# Patient Record
Sex: MALE | NOT HISPANIC OR LATINO | Employment: FULL TIME | URBAN - METROPOLITAN AREA
[De-identification: names, ages, dates, MRNs, and addresses within clinical notes are randomized per-mention and may not be internally consistent; named-entity substitution may affect disease eponyms.]

---

## 2019-11-11 ENCOUNTER — OFFICE VISIT (OUTPATIENT)
Dept: FAMILY MEDICINE CLINIC | Facility: CLINIC | Age: 44
End: 2019-11-11

## 2019-11-11 VITALS
HEART RATE: 102 BPM | SYSTOLIC BLOOD PRESSURE: 120 MMHG | OXYGEN SATURATION: 99 % | BODY MASS INDEX: 27.32 KG/M2 | TEMPERATURE: 97.8 F | HEIGHT: 65 IN | DIASTOLIC BLOOD PRESSURE: 72 MMHG | WEIGHT: 164 LBS

## 2019-11-11 DIAGNOSIS — G43.009 MIGRAINE WITHOUT AURA AND WITHOUT STATUS MIGRAINOSUS, NOT INTRACTABLE: ICD-10-CM

## 2019-11-11 DIAGNOSIS — J02.9 SORE THROAT: Primary | ICD-10-CM

## 2019-11-11 DIAGNOSIS — J30.1 ACUTE SEASONAL ALLERGIC RHINITIS DUE TO POLLEN: ICD-10-CM

## 2019-11-11 DIAGNOSIS — H65.23 BILATERAL CHRONIC SEROUS OTITIS MEDIA: ICD-10-CM

## 2019-11-11 PROCEDURE — 99214 OFFICE O/P EST MOD 30 MIN: CPT | Performed by: FAMILY MEDICINE

## 2019-11-11 RX ORDER — CYCLOBENZAPRINE HCL 10 MG
10 TABLET ORAL DAILY
COMMUNITY
End: 2020-06-22

## 2019-11-11 RX ORDER — FLUTICASONE PROPIONATE 50 MCG
2 SPRAY, SUSPENSION (ML) NASAL DAILY
Qty: 1 BOTTLE | Refills: 3 | Status: SHIPPED | OUTPATIENT
Start: 2019-11-11 | End: 2019-11-12

## 2019-11-11 RX ORDER — FENOFIBRATE 160 MG/1
160 TABLET ORAL DAILY
COMMUNITY
End: 2020-06-22

## 2019-11-11 RX ORDER — ACETAMINOPHEN, ASPIRIN AND CAFFEINE 250; 250; 65 MG/1; MG/1; MG/1
1 TABLET, FILM COATED ORAL DAILY
Qty: 60 TABLET | Refills: 3 | Status: SHIPPED | OUTPATIENT
Start: 2019-11-11 | End: 2021-03-12

## 2019-11-11 RX ORDER — CETIRIZINE HYDROCHLORIDE 10 MG/1
10 TABLET ORAL DAILY
COMMUNITY
End: 2020-04-29 | Stop reason: SDUPTHER

## 2019-11-11 NOTE — PROGRESS NOTES
No chief complaint on file.      Dony Chisholm is a 44 y.o. male.     History of Present Illness   Pt is here to get reestablished and   ST for about 5 days. And he feels a pinching pain in his left ear.  No cough, congestion, or sinus pain.  No fever or chills.    He has been having headache frontal for some time and comes and goes.  No blurred vision or SOB.  He has had migraine in the past as well.    The following portions of the patient's history were reviewed and updated as appropriate: allergies, current medications, past family history, past medical history, past social history, past surgical history and problem list.    Review of Systems   Constitutional: Negative for fatigue.   Eyes: Negative for blurred vision.   Respiratory: Negative for cough, chest tightness and shortness of breath.    Cardiovascular: Negative for chest pain, palpitations and leg swelling.   Neurological: Negative for dizziness, light-headedness and headache.       Patient Active Problem List   Diagnosis   • Nephrolithiasis   • Migraine   • Hypertriglyceridemia   • Eczema   • Acute seasonal allergic rhinitis due to pollen   • Sore throat   • Bilateral chronic serous otitis media       No Known Allergies      Current Outpatient Medications:   •  cetirizine (zyrTEC) 10 MG tablet, Take 10 mg by mouth Daily., Disp: , Rfl:   •  cyclobenzaprine (FLEXERIL) 10 MG tablet, Take 10 mg by mouth Daily., Disp: , Rfl:   •  fenofibrate 160 MG tablet, Take 160 mg by mouth Daily., Disp: , Rfl:     Past Medical History:   Diagnosis Date   • Abdominal pain    • Acute seasonal allergic rhinitis due to pollen    • Chest pain    • Eczema    • Headache    • Hypertriglyceridemia    • Lumbar strain    • 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 No pertinent surgical history.    Family History   Problem Relation Age of Onset   • No Known Problems  "Mother        Social History     Tobacco Use   • Smoking status: Never Smoker   • Smokeless tobacco: Never Used   Substance Use Topics   • Alcohol use: No     Frequency: Never            Objective     Visit Vitals  /72   Pulse 102   Temp 97.8 °F (36.6 °C)   Ht 165.1 cm (65\")   Wt 74.4 kg (164 lb)   SpO2 99%   BMI 27.29 kg/m²       Physical Exam   Constitutional: He appears well-developed. No distress.   HENT:   Bilateral effusion   Eyes: Conjunctivae and lids are normal.   Neck: Trachea normal. Carotid bruit is not present. No thyroid mass and no thyromegaly present.   Cardiovascular: Normal rate, regular rhythm and normal heart sounds.   Pulmonary/Chest: Effort normal and breath sounds normal.   Lymphadenopathy:     He has no cervical adenopathy.   Neurological: He is alert. He is not disoriented.   Skin: Skin is warm and dry.   Psychiatric: He has a normal mood and affect. His speech is normal and behavior is normal. He is attentive.   Nursing note and vitals reviewed.          Procedures    Assessment/Plan   Problems Addressed this Visit        Cardiovascular and Mediastinum    Migraine    Relevant Medications    cyclobenzaprine (FLEXERIL) 10 MG tablet       Respiratory    Acute seasonal allergic rhinitis due to pollen    Sore throat - Primary       Nervous and Auditory    Bilateral chronic serous otitis media          No orders of the defined types were placed in this encounter.      Current Outpatient Medications   Medication Sig Dispense Refill   • cetirizine (zyrTEC) 10 MG tablet Take 10 mg by mouth Daily.     • cyclobenzaprine (FLEXERIL) 10 MG tablet Take 10 mg by mouth Daily.     • fenofibrate 160 MG tablet Take 160 mg by mouth Daily.       No current facility-administered medications for this visit.      Sx tx and otc meds and fluids and rest  SE discussed.    No Follow-up on file.    There are no Patient Instructions on file for this visit.         "

## 2019-11-12 RX ORDER — MOMETASONE FUROATE 50 UG/1
2 SPRAY, METERED NASAL DAILY
Qty: 1 EACH | Refills: 12 | Status: SHIPPED | OUTPATIENT
Start: 2019-11-12 | End: 2020-06-22

## 2019-12-06 ENCOUNTER — OFFICE VISIT (OUTPATIENT)
Dept: FAMILY MEDICINE CLINIC | Facility: CLINIC | Age: 44
End: 2019-12-06

## 2019-12-06 VITALS
OXYGEN SATURATION: 98 % | BODY MASS INDEX: 26.96 KG/M2 | DIASTOLIC BLOOD PRESSURE: 70 MMHG | WEIGHT: 162 LBS | TEMPERATURE: 97.6 F | SYSTOLIC BLOOD PRESSURE: 110 MMHG | HEART RATE: 101 BPM

## 2019-12-06 DIAGNOSIS — R10.84 GENERALIZED ABDOMINAL PAIN: Primary | ICD-10-CM

## 2019-12-06 LAB
BASOPHILS # BLD AUTO: 0 X10E3/UL (ref 0–0.2)
BASOPHILS NFR BLD AUTO: 0 %
EOSINOPHIL # BLD AUTO: 0.3 X10E3/UL (ref 0–0.4)
EOSINOPHIL NFR BLD AUTO: 3 %
ERYTHROCYTE [DISTWIDTH] IN BLOOD BY AUTOMATED COUNT: 13.4 % (ref 12.3–15.4)
HCT VFR BLD AUTO: 42.2 % (ref 37.5–51)
HGB BLD-MCNC: 13.9 G/DL (ref 13–17.7)
LYMPHOCYTES # BLD AUTO: 2.5 X10E3/UL (ref 0.7–3.1)
LYMPHOCYTES NFR BLD AUTO: 30 %
MCH RBC QN AUTO: 27.9 PG (ref 26.6–33)
MCHC RBC AUTO-ENTMCNC: 32.9 G/DL (ref 31.5–35.7)
MCV RBC AUTO: 85 FL (ref 79–97)
MONOCYTES # BLD AUTO: 0.7 X10E3/UL (ref 0.1–0.9)
MONOCYTES NFR BLD AUTO: 8 %
NEUTROPHILS # BLD AUTO: 4.7 X10E3/UL (ref 1.4–7)
NEUTROPHILS NFR BLD AUTO: 59 %
PLATELET # BLD AUTO: 261 X10E3/UL (ref 150–450)
RBC # BLD AUTO: 4.99 X10E6/UL (ref 4.14–5.8)
WBC # BLD AUTO: 8.2 X10E3/UL (ref 3.4–10.8)

## 2019-12-06 PROCEDURE — 99213 OFFICE O/P EST LOW 20 MIN: CPT | Performed by: FAMILY MEDICINE

## 2019-12-06 NOTE — PROGRESS NOTES
Chief Complaint   Patient presents with   • Follow-up     Pt had surgery on Monday. Having pain at the incision site.        Dony Chisholm is a 44 y.o. male.     History of Present Illness   PT is here for follow up for appendicitis surgery this past Monday and he states he is having pains on site of incision.  He called surgery office and was told to follow up with us.  He has no fever or chills.  Pain is intermittent and no discharge from the site.  Dr Arthur is surgeon.  The following portions of the patient's history were reviewed and updated as appropriate: allergies, current medications, past family history, past medical history, past social history, past surgical history and problem list.    Review of Systems   Constitutional: Negative for fatigue.   Eyes: Negative for blurred vision.   Respiratory: Negative for cough, chest tightness and shortness of breath.    Cardiovascular: Negative for chest pain, palpitations and leg swelling.   Gastrointestinal: Positive for abdominal pain.   Neurological: Negative for dizziness, light-headedness and headache.       Patient Active Problem List   Diagnosis   • Nephrolithiasis   • Migraine   • Hypertriglyceridemia   • Eczema   • Acute seasonal allergic rhinitis due to pollen   • Sore throat   • Bilateral chronic serous otitis media   • Generalized abdominal pain       No Known Allergies      Current Outpatient Medications:   •  aspirin-acetaminophen-caffeine (EXCEDRIN MIGRAINE) 250-250-65 MG per tablet, Take 1 tablet by mouth Daily. Take one tablet daily as needed., Disp: 60 tablet, Rfl: 3  •  cetirizine (zyrTEC) 10 MG tablet, Take 10 mg by mouth Daily., Disp: , Rfl:   •  cyclobenzaprine (FLEXERIL) 10 MG tablet, Take 10 mg by mouth Daily., Disp: , Rfl:   •  fenofibrate 160 MG tablet, Take 160 mg by mouth Daily., Disp: , Rfl:   •  mometasone (NASONEX) 50 MCG/ACT nasal spray, 2 sprays into the nostril(s) as directed by provider Daily., Disp: 1 each, Rfl:  12    Past Medical History:   Diagnosis Date   • Abdominal pain    • Acute seasonal allergic rhinitis due to pollen    • Chest pain    • Eczema    • Headache    • Hypertriglyceridemia    • Lumbar strain    • 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    Family History   Problem Relation Age of Onset   • No Known Problems Mother        Social History     Tobacco Use   • Smoking status: Never Smoker   • Smokeless tobacco: Never Used   Substance Use Topics   • Alcohol use: No     Frequency: Never            Objective     Visit Vitals  /70   Pulse 101   Temp 97.6 °F (36.4 °C)   Wt 73.5 kg (162 lb)   SpO2 98%   BMI 26.96 kg/m²       Physical Exam   Constitutional: He appears well-developed and well-nourished.   Abdominal: Soft. Bowel sounds are normal.   Some ttp ruq pain at site of surgery.  No discharge  And erythema present     Nursing note and vitals reviewed.      Lab Results   Component Value Date    BUN 9 12/02/2019    CREATININE 0.8 12/02/2019    BCR 11.3 12/02/2019    K 4.0 12/02/2019    CO2 25 12/02/2019    CALCIUM 8.8 12/02/2019    ALBUMIN 3.7 12/02/2019    LABIL2 1.0 (L) 12/02/2019    AST 28 12/02/2019    ALT 40 12/02/2019       WBC   Date Value Ref Range Status   12/02/2019 8.14 4.5 - 11.0 10*3/uL Final     RBC   Date Value Ref Range Status   12/02/2019 4.98 4.5 - 5.9 10*6/uL Final     Hemoglobin   Date Value Ref Range Status   12/02/2019 14.0 13.5 - 17.5 g/dL Final     Hematocrit   Date Value Ref Range Status   12/02/2019 43.4 41.0 - 53.0 % Final     MCV   Date Value Ref Range Status   12/02/2019 87.1 80.0 - 100.0 fL Final     MCH   Date Value Ref Range Status   12/02/2019 28.1 26.0 - 34.0 pg Final     MCHC   Date Value Ref Range Status   12/02/2019 32.3 31.0 - 37.0 g/dL Final     RDW   Date Value Ref Range Status   12/02/2019 13.9 12.0 - 16.8 % Final     MPV   Date Value Ref Range Status    12/02/2019 11.7 (H) 6.7 - 10.8 fL Final     Platelets   Date Value Ref Range Status   12/02/2019 216 140 - 440 10*3/uL Final     Neutrophil Rel %   Date Value Ref Range Status   12/02/2019 55.8 45 - 80 % Final     Lymphocyte Rel %   Date Value Ref Range Status   12/02/2019 34.6 15 - 50 % Final     Monocyte Rel %   Date Value Ref Range Status   12/02/2019 6.1 0 - 15 % Final     Eosinophil %   Date Value Ref Range Status   12/02/2019 3.2 0 - 7 % Final     Basophil Rel %   Date Value Ref Range Status   12/02/2019 0.2 0 - 2 % Final     Immature Grans %   Date Value Ref Range Status   12/02/2019 0.1 (H) 0 % Final     Neutrophils Absolute   Date Value Ref Range Status   12/02/2019 4.53 2.0 - 8.8 10*3/uL Final     Lymphocytes Absolute   Date Value Ref Range Status   12/02/2019 2.82 0.7 - 5.5 10*3/uL Final     Monocytes Absolute   Date Value Ref Range Status   12/02/2019 0.50 0.0 - 1.7 10*3/uL Final     Eosinophils Absolute   Date Value Ref Range Status   12/02/2019 0.26 0.0 - 0.8 10*3/uL Final     Basophils Absolute   Date Value Ref Range Status   12/02/2019 0.02 0.0 - 0.2 10*3/uL Final     Immature Grans, Absolute   Date Value Ref Range Status   12/02/2019 0.01 <1 10*3/uL Final     nRBC   Date Value Ref Range Status   12/02/2019 0 0 /100(WBC) Final       No results found for: HGBA1C    No results found for: [id number]    No results found for: TSH    No results found for: CHOL  No results found for: TRIG  No results found for: HDL  No results found for: LDL  No results found for: VLDL  No results found for: LDLHDL      Procedures    Assessment/Plan   Problems Addressed this Visit        Nervous and Auditory    Generalized abdominal pain - Primary    Relevant Orders    CBC & Differential          Orders Placed This Encounter   Procedures   • CBC & Differential     Order Specific Question:   Manual Differential     Answer:   No       Current Outpatient Medications   Medication Sig Dispense Refill   •  aspirin-acetaminophen-caffeine (EXCEDRIN MIGRAINE) 250-250-65 MG per tablet Take 1 tablet by mouth Daily. Take one tablet daily as needed. 60 tablet 3   • cetirizine (zyrTEC) 10 MG tablet Take 10 mg by mouth Daily.     • cyclobenzaprine (FLEXERIL) 10 MG tablet Take 10 mg by mouth Daily.     • fenofibrate 160 MG tablet Take 160 mg by mouth Daily.     • mometasone (NASONEX) 50 MCG/ACT nasal spray 2 sprays into the nostril(s) as directed by provider Daily. 1 each 12     No current facility-administered medications for this visit.      Will get cbc and warning signs discussed.  No Follow-up on file.    There are no Patient Instructions on file for this visit.

## 2020-01-28 ENCOUNTER — OFFICE VISIT (OUTPATIENT)
Dept: FAMILY MEDICINE CLINIC | Facility: CLINIC | Age: 45
End: 2020-01-28

## 2020-01-28 VITALS
DIASTOLIC BLOOD PRESSURE: 78 MMHG | SYSTOLIC BLOOD PRESSURE: 112 MMHG | TEMPERATURE: 97.5 F | WEIGHT: 164 LBS | HEIGHT: 65 IN | OXYGEN SATURATION: 98 % | BODY MASS INDEX: 27.32 KG/M2 | HEART RATE: 75 BPM

## 2020-01-28 DIAGNOSIS — F51.01 PRIMARY INSOMNIA: ICD-10-CM

## 2020-01-28 DIAGNOSIS — R10.84 GENERALIZED ABDOMINAL PAIN: Primary | ICD-10-CM

## 2020-01-28 PROCEDURE — 99214 OFFICE O/P EST MOD 30 MIN: CPT | Performed by: FAMILY MEDICINE

## 2020-01-28 RX ORDER — OMEPRAZOLE 20 MG/1
20 CAPSULE, DELAYED RELEASE ORAL DAILY
Qty: 30 CAPSULE | Refills: 3 | Status: SHIPPED | OUTPATIENT
Start: 2020-01-28 | End: 2020-02-03 | Stop reason: SDUPTHER

## 2020-01-28 NOTE — PROGRESS NOTES
Chief Complaint   Patient presents with   • Abdominal Pain     Pt is her c/o stomach pain. Ongoing for 15 days. Pain is burning and pinching. Pt had an appendectomy on Dec 2.    • Insomnia     Pt works 3rd shift and has stomach falling asleep        Doyn Chisholm is a 45 y.o. male.   Pt is here for 2 week hx of some burning on rt side of abd and chest and this comes and goes.  Feels like a pinching feeling.  No NVCD.  No fever chills.  Normal appetite.  No rash.   Insomnia- he works night shift and has trouble falling asleep when he gets home.  And this causes some fatigue.  Occasional headaches as well.    History of Present Illness    The following portions of the patient's history were reviewed and updated as appropriate: allergies, current medications, past family history, past medical history, past social history, past surgical history and problem list.    Review of Systems   Constitutional: Negative for fatigue.   Eyes: Negative for blurred vision.   Respiratory: Negative for cough, chest tightness and shortness of breath.    Cardiovascular: Negative for chest pain, palpitations and leg swelling.   Neurological: Negative for dizziness, light-headedness and headache.       Patient Active Problem List   Diagnosis   • Nephrolithiasis   • Migraine   • Hypertriglyceridemia   • Eczema   • Acute seasonal allergic rhinitis due to pollen   • Sore throat   • Bilateral chronic serous otitis media   • Generalized abdominal pain   • Primary insomnia       No Known Allergies      Current Outpatient Medications:   •  aspirin-acetaminophen-caffeine (EXCEDRIN MIGRAINE) 250-250-65 MG per tablet, Take 1 tablet by mouth Daily. Take one tablet daily as needed., Disp: 60 tablet, Rfl: 3  •  cetirizine (zyrTEC) 10 MG tablet, Take 10 mg by mouth Daily., Disp: , Rfl:   •  cyclobenzaprine (FLEXERIL) 10 MG tablet, Take 10 mg by mouth Daily., Disp: , Rfl:   •  fenofibrate 160 MG tablet, Take 160 mg by mouth Daily., Disp: ,  "Rfl:   •  mometasone (NASONEX) 50 MCG/ACT nasal spray, 2 sprays into the nostril(s) as directed by provider Daily., Disp: 1 each, Rfl: 12  •  omeprazole (PrilOSEC) 20 MG capsule, Take 1 capsule by mouth Daily., Disp: 30 capsule, Rfl: 3    Past Medical History:   Diagnosis Date   • Abdominal pain    • Acute seasonal allergic rhinitis due to pollen    • Chest pain    • Eczema    • Headache    • Hypertriglyceridemia    • Lumbar strain    • 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 No pertinent surgical history.    Family History   Problem Relation Age of Onset   • No Known Problems Mother        Social History     Tobacco Use   • Smoking status: Never Smoker   • Smokeless tobacco: Never Used   Substance Use Topics   • Alcohol use: No     Frequency: Never            Objective     Visit Vitals  /78   Pulse 75   Temp 97.5 °F (36.4 °C)   Ht 165.1 cm (65\")   Wt 74.4 kg (164 lb)   SpO2 98%   BMI 27.29 kg/m²       Physical Exam   Constitutional: He appears well-developed. No distress.   Eyes: Conjunctivae and lids are normal.   Neck: Trachea normal. Carotid bruit is not present. No thyroid mass and no thyromegaly present.   Cardiovascular: Normal rate, regular rhythm and normal heart sounds.   Pulmonary/Chest: Effort normal and breath sounds normal.   Abdominal: Soft. Bowel sounds are normal. He exhibits no distension and no mass. There is no tenderness. There is no rebound and no guarding.   Lymphadenopathy:     He has no cervical adenopathy.   Neurological: He is alert. He is not disoriented.   Skin: Skin is warm and dry. No rash noted.   Psychiatric: He has a normal mood and affect. His speech is normal and behavior is normal. He is attentive.   Nursing note and vitals reviewed.      Lab Results   Component Value Date    BUN 13 12/07/2019    CREATININE 0.9 12/07/2019    BCR 14.4 12/07/2019    K 4.2 12/07/2019    CO2 29 " 12/07/2019    CALCIUM 9.0 12/07/2019    ALBUMIN 4.0 12/07/2019    LABIL2 1.1 12/07/2019    AST 34 12/07/2019    ALT 55 (H) 12/07/2019       WBC   Date Value Ref Range Status   12/07/2019 6.07 4.5 - 11.0 10*3/uL Final     RBC   Date Value Ref Range Status   12/07/2019 4.89 4.5 - 5.9 10*6/uL Final     Hemoglobin   Date Value Ref Range Status   12/07/2019 13.7 13.5 - 17.5 g/dL Final     Hematocrit   Date Value Ref Range Status   12/07/2019 43.4 41.0 - 53.0 % Final     MCV   Date Value Ref Range Status   12/07/2019 88.8 80.0 - 100.0 fL Final     MCH   Date Value Ref Range Status   12/07/2019 28.0 26.0 - 34.0 pg Final     MCHC   Date Value Ref Range Status   12/07/2019 31.6 31.0 - 37.0 g/dL Final     RDW   Date Value Ref Range Status   12/07/2019 13.6 12.0 - 16.8 % Final     MPV   Date Value Ref Range Status   12/07/2019 10.0 6.7 - 10.8 fL Final     Platelets   Date Value Ref Range Status   12/07/2019 233 140 - 440 10*3/uL Final     Neutrophil Rel %   Date Value Ref Range Status   12/07/2019 53.8 45 - 80 % Final     Lymphocyte Rel %   Date Value Ref Range Status   12/07/2019 34.9 15 - 50 % Final     Monocyte Rel %   Date Value Ref Range Status   12/07/2019 7.7 0 - 15 % Final     Eosinophil %   Date Value Ref Range Status   12/07/2019 3.1 0 - 7 % Final     Basophil Rel %   Date Value Ref Range Status   12/07/2019 0.2 0 - 2 % Final     Immature Grans %   Date Value Ref Range Status   12/07/2019 0.3 (H) 0 % Final     Neutrophils Absolute   Date Value Ref Range Status   12/07/2019 3.26 2.0 - 8.8 10*3/uL Final     Lymphocytes Absolute   Date Value Ref Range Status   12/07/2019 2.12 0.7 - 5.5 10*3/uL Final     Monocytes Absolute   Date Value Ref Range Status   12/07/2019 0.47 0.0 - 1.7 10*3/uL Final     Eosinophils Absolute   Date Value Ref Range Status   12/07/2019 0.19 0.0 - 0.8 10*3/uL Final     Basophils Absolute   Date Value Ref Range Status   12/07/2019 0.01 0.0 - 0.2 10*3/uL Final     Immature Grans, Absolute   Date  Value Ref Range Status   12/07/2019 0.02 <1 10*3/uL Final     BC   Date Value Ref Range Status   12/07/2019 0 0 /100(WBC) Final       No results found for: HGBA1C    No results found for: [id number]    No results found for: TSH    No results found for: CHOL  No results found for: TRIG  No results found for: HDL  No results found for: LDL  No results found for: VLDL  No results found for: LDLHDL      Procedures    Assessment/Plan   Problems Addressed this Visit        Nervous and Auditory    Generalized abdominal pain - Primary       Other    Primary insomnia          No orders of the defined types were placed in this encounter.      Current Outpatient Medications   Medication Sig Dispense Refill   • aspirin-acetaminophen-caffeine (EXCEDRIN MIGRAINE) 250-250-65 MG per tablet Take 1 tablet by mouth Daily. Take one tablet daily as needed. 60 tablet 3   • cetirizine (zyrTEC) 10 MG tablet Take 10 mg by mouth Daily.     • cyclobenzaprine (FLEXERIL) 10 MG tablet Take 10 mg by mouth Daily.     • fenofibrate 160 MG tablet Take 160 mg by mouth Daily.     • mometasone (NASONEX) 50 MCG/ACT nasal spray 2 sprays into the nostril(s) as directed by provider Daily. 1 each 12   • omeprazole (PrilOSEC) 20 MG capsule Take 1 capsule by mouth Daily. 30 capsule 3     No current facility-administered medications for this visit.      Start PPI and will try melatonin for   No follow-ups on file.    There are no Patient Instructions on file for this visit.

## 2020-02-03 ENCOUNTER — TELEPHONE (OUTPATIENT)
Dept: FAMILY MEDICINE CLINIC | Facility: CLINIC | Age: 45
End: 2020-02-03

## 2020-02-03 RX ORDER — OMEPRAZOLE 20 MG/1
20 CAPSULE, DELAYED RELEASE ORAL DAILY
Qty: 30 CAPSULE | Refills: 3 | Status: SHIPPED | OUTPATIENT
Start: 2020-02-03 | End: 2020-06-22

## 2020-02-03 NOTE — TELEPHONE ENCOUNTER
PT IS SAYING PHARMACY DIDN'T RECEIVE HIS MEDICATION PLEASE RESEND omeprazole (PrilOSEC) 20 MG capsule

## 2020-04-29 ENCOUNTER — OFFICE VISIT (OUTPATIENT)
Dept: FAMILY MEDICINE CLINIC | Facility: CLINIC | Age: 45
End: 2020-04-29

## 2020-04-29 DIAGNOSIS — J02.9 SORE THROAT: Primary | ICD-10-CM

## 2020-04-29 DIAGNOSIS — J30.1 ACUTE SEASONAL ALLERGIC RHINITIS DUE TO POLLEN: ICD-10-CM

## 2020-04-29 PROCEDURE — 99213 OFFICE O/P EST LOW 20 MIN: CPT | Performed by: FAMILY MEDICINE

## 2020-04-29 RX ORDER — CETIRIZINE HYDROCHLORIDE 10 MG/1
10 TABLET ORAL DAILY
Qty: 30 TABLET | Refills: 3 | Status: SHIPPED | OUTPATIENT
Start: 2020-04-29 | End: 2020-06-22

## 2020-04-29 NOTE — PROGRESS NOTES
CC: Cough    Subjective   Smith Chisholm is a 45 y.o. male.     History of Present Illness   The patient presents today for follow-up via a video visit thru [facility] due to the COVID-19 pandemic for  3 day hx of sore throat.  He has no fever or chills or body aches.  He has been using ibuprofen for this but not helping much.  No cough. He has some redness in the throat.  He is currently not on allergy meds but does have seasonal allergies        The following portions of the patient's history were reviewed and updated as appropriate: allergies, current medications, past family history, past medical history, past social history, past surgical history and problem list.    Review of Systems  See above    Patient Active Problem List   Diagnosis   • Nephrolithiasis   • Migraine   • Hypertriglyceridemia   • Eczema   • Acute seasonal allergic rhinitis due to pollen   • Sore throat   • Bilateral chronic serous otitis media   • Generalized abdominal pain   • Primary insomnia       No Known Allergies      Current Outpatient Medications:   •  aspirin-acetaminophen-caffeine (EXCEDRIN MIGRAINE) 250-250-65 MG per tablet, Take 1 tablet by mouth Daily. Take one tablet daily as needed., Disp: 60 tablet, Rfl: 3  •  cetirizine (zyrTEC) 10 MG tablet, Take 1 tablet by mouth Daily., Disp: 30 tablet, Rfl: 3  •  cyclobenzaprine (FLEXERIL) 10 MG tablet, Take 10 mg by mouth Daily., Disp: , Rfl:   •  fenofibrate 160 MG tablet, Take 160 mg by mouth Daily., Disp: , Rfl:   •  mometasone (NASONEX) 50 MCG/ACT nasal spray, 2 sprays into the nostril(s) as directed by provider Daily., Disp: 1 each, Rfl: 12  •  omeprazole (PrilOSEC) 20 MG capsule, Take 1 capsule by mouth Daily., Disp: 30 capsule, Rfl: 3    Past Medical History:   Diagnosis Date   • Abdominal pain    • Acute seasonal allergic rhinitis due to pollen    • Chest pain    • Eczema    • Headache    • Hypertriglyceridemia    • Lumbar strain    • 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    Family History   Problem Relation Age of Onset   • No Known Problems Mother        Social History     Tobacco Use   • Smoking status: Never Smoker   • Smokeless tobacco: Never Used   Substance Use Topics   • Alcohol use: No     Frequency: Never            Objective     There were no vitals taken for this visit. Video Visit    Physical Exam  NAD  AAOx3  No labored breathing.  EOMI   PERRLA  Some redness in throat    Lab Results   Component Value Date    BUN 9 02/26/2020    CREATININE 0.8 02/26/2020    BCR 11.3 02/26/2020    K 3.9 02/26/2020    CO2 22 02/26/2020    CALCIUM 9.0 02/26/2020    ALBUMIN 3.8 02/26/2020    LABIL2 1.1 02/26/2020    AST 37 02/26/2020    ALT 45 02/26/2020       WBC   Date Value Ref Range Status   02/26/2020 7.41 4.5 - 11.0 10*3/uL Final     RBC   Date Value Ref Range Status   02/26/2020 4.83 4.5 - 5.9 10*6/uL Final     Hemoglobin   Date Value Ref Range Status   02/26/2020 13.6 13.5 - 17.5 g/dL Final     Hematocrit   Date Value Ref Range Status   02/26/2020 41.3 41.0 - 53.0 % Final     MCV   Date Value Ref Range Status   02/26/2020 85.5 80.0 - 100.0 fL Final     MCH   Date Value Ref Range Status   02/26/2020 28.2 26.0 - 34.0 pg Final     MCHC   Date Value Ref Range Status   02/26/2020 32.9 31.0 - 37.0 g/dL Final     RDW   Date Value Ref Range Status   02/26/2020 13.8 12.0 - 16.8 % Final     MPV   Date Value Ref Range Status   02/26/2020 10.3 6.7 - 10.8 fL Final     Platelets   Date Value Ref Range Status   02/26/2020 197 140 - 440 10*3/uL Final     Neutrophil Rel %   Date Value Ref Range Status   02/26/2020 61.6 45 - 80 % Final     Lymphocyte Rel %   Date Value Ref Range Status   02/26/2020 28.5 15 - 50 % Final     Monocyte Rel %   Date Value Ref Range Status   02/26/2020 7.6 0 - 15 % Final     Eosinophil %   Date Value Ref Range Status   02/26/2020 1.9 0 - 7 % Final     Basophil  Rel %   Date Value Ref Range Status   02/26/2020 0.1 0 - 2 % Final     Immature Grans %   Date Value Ref Range Status   02/26/2020 0.3 (H) 0 % Final     Neutrophils Absolute   Date Value Ref Range Status   02/26/2020 4.57 2.0 - 8.8 10*3/uL Final     Lymphocytes Absolute   Date Value Ref Range Status   02/26/2020 2.11 0.7 - 5.5 10*3/uL Final     Monocytes Absolute   Date Value Ref Range Status   02/26/2020 0.56 0.0 - 1.7 10*3/uL Final     Eosinophils Absolute   Date Value Ref Range Status   02/26/2020 0.14 0.0 - 0.8 10*3/uL Final     Basophils Absolute   Date Value Ref Range Status   02/26/2020 0.01 0.0 - 0.2 10*3/uL Final     Immature Grans, Absolute   Date Value Ref Range Status   02/26/2020 0.02 <1 10*3/uL Final     nRBC   Date Value Ref Range Status   02/26/2020 0 0 /100(WBC) Final           Procedures    Assessment/Plan   Problems Addressed this Visit        Respiratory    Acute seasonal allergic rhinitis due to pollen    Relevant Medications    cetirizine (zyrTEC) 10 MG tablet    Sore throat - Primary          No orders of the defined types were placed in this encounter.      Current Outpatient Medications   Medication Sig Dispense Refill   • aspirin-acetaminophen-caffeine (EXCEDRIN MIGRAINE) 250-250-65 MG per tablet Take 1 tablet by mouth Daily. Take one tablet daily as needed. 60 tablet 3   • cetirizine (zyrTEC) 10 MG tablet Take 1 tablet by mouth Daily. 30 tablet 3   • cyclobenzaprine (FLEXERIL) 10 MG tablet Take 10 mg by mouth Daily.     • fenofibrate 160 MG tablet Take 160 mg by mouth Daily.     • mometasone (NASONEX) 50 MCG/ACT nasal spray 2 sprays into the nostril(s) as directed by provider Daily. 1 each 12   • omeprazole (PrilOSEC) 20 MG capsule Take 1 capsule by mouth Daily. 30 capsule 3     No current facility-administered medications for this visit.        No follow-ups on file.    There are no Patient Instructions on file for this visit.       Warm salt water gargles and will restart zyrtec.  Follow  up if no better.  Pt will call.    Time spent on visit:  12  minutes.  This patient has consented to a telehealth visit via video. The visit was scheduled as a video visit to comply with patient safety concerns in accordance with CDC recommendations.  All vitals recorded within this visit are reported by the patient.

## 2020-05-01 ENCOUNTER — TELEPHONE (OUTPATIENT)
Dept: FAMILY MEDICINE CLINIC | Facility: CLINIC | Age: 45
End: 2020-05-01

## 2020-05-01 RX ORDER — AMOXICILLIN 875 MG/1
875 TABLET, COATED ORAL 2 TIMES DAILY
Qty: 20 TABLET | Refills: 0 | Status: SHIPPED | OUTPATIENT
Start: 2020-05-01 | End: 2020-06-22

## 2020-06-04 ENCOUNTER — TELEMEDICINE (OUTPATIENT)
Dept: FAMILY MEDICINE CLINIC | Facility: CLINIC | Age: 45
End: 2020-06-04

## 2020-06-04 DIAGNOSIS — R10.84 GENERALIZED ABDOMINAL PAIN: Primary | ICD-10-CM

## 2020-06-04 DIAGNOSIS — T18.9XXA FOREIGN BODY IN DIGESTIVE TRACT, INITIAL ENCOUNTER: ICD-10-CM

## 2020-06-04 PROCEDURE — 99213 OFFICE O/P EST LOW 20 MIN: CPT | Performed by: FAMILY MEDICINE

## 2020-06-04 NOTE — PROGRESS NOTES
CC: abd pain    Subjective   Smith Chisholm is a 45 y.o. male.     History of Present Illness   The patient presents today for follow-up via a video visit due to the COVID-19 pandemic for   abd pain for the past several months off and on and he went to ER today for abd pains and all came back normal except for a foreign body in alementry tract and was told to follow up and get recheck with repeat chest xray in few days and possible GI consult as well.  He has no NVCD.  No SOB or cough or cold sx.  He has no dysuria or urine issues.  No fever or chills.  No wt gain or loss.              The following portions of the patient's history were reviewed and updated as appropriate: allergies, current medications, past family history, past medical history, past social history, past surgical history and problem list.    Review of Systems   Constitutional: Negative for fatigue.   Eyes: Negative for blurred vision.   Respiratory: Negative for cough, chest tightness and shortness of breath.    Cardiovascular: Negative for chest pain, palpitations and leg swelling.   Neurological: Negative for dizziness, light-headedness and headache.      See above    Patient Active Problem List   Diagnosis   • Nephrolithiasis   • Migraine   • Hypertriglyceridemia   • Eczema   • Acute seasonal allergic rhinitis due to pollen   • Sore throat   • Bilateral chronic serous otitis media   • Generalized abdominal pain   • Primary insomnia   • Foreign body alimentary tract       No Known Allergies      Current Outpatient Medications:   •  amoxicillin (AMOXIL) 875 MG tablet, Take 1 tablet by mouth 2 (Two) Times a Day., Disp: 20 tablet, Rfl: 0  •  aspirin-acetaminophen-caffeine (EXCEDRIN MIGRAINE) 250-250-65 MG per tablet, Take 1 tablet by mouth Daily. Take one tablet daily as needed., Disp: 60 tablet, Rfl: 3  •  cetirizine (zyrTEC) 10 MG tablet, Take 1 tablet by mouth Daily., Disp: 30 tablet, Rfl: 3  •  cyclobenzaprine (FLEXERIL) 10 MG tablet, Take  10 mg by mouth Daily., Disp: , Rfl:   •  fenofibrate 160 MG tablet, Take 160 mg by mouth Daily., Disp: , Rfl:   •  mometasone (NASONEX) 50 MCG/ACT nasal spray, 2 sprays into the nostril(s) as directed by provider Daily., Disp: 1 each, Rfl: 12  •  omeprazole (PrilOSEC) 20 MG capsule, Take 1 capsule by mouth Daily., Disp: 30 capsule, Rfl: 3    Past Medical History:   Diagnosis Date   • Abdominal pain    • Acute seasonal allergic rhinitis due to pollen    • Chest pain    • Eczema    • Headache    • Hypertriglyceridemia    • Lumbar strain    • 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 No pertinent surgical history.    Family History   Problem Relation Age of Onset   • No Known Problems Mother        Social History     Tobacco Use   • Smoking status: Never Smoker   • Smokeless tobacco: Never Used   Substance Use Topics   • Alcohol use: No     Frequency: Never            Objective     There were no vitals taken for this visit. Video Visit    Physical Exam  NAD  AAOx3  No labored breathing.  EOMI   PERRLA      Lab Results   Component Value Date    BUN 13 06/04/2020    CREATININE 0.8 06/04/2020    BCR 16.3 06/04/2020    K 4.1 06/04/2020    CO2 18 (L) 06/04/2020    CALCIUM 9.1 06/04/2020    ALBUMIN 4.0 06/04/2020    LABIL2 1.0 (L) 06/04/2020    AST 34 06/04/2020    ALT 39 06/04/2020       WBC   Date Value Ref Range Status   06/04/2020 8.66 4.5 - 11.0 10*3/uL Final     RBC   Date Value Ref Range Status   06/04/2020 4.93 4.5 - 5.9 10*6/uL Final     Hemoglobin   Date Value Ref Range Status   06/04/2020 14.3 13.5 - 17.5 g/dL Final     Hematocrit   Date Value Ref Range Status   06/04/2020 42.5 41.0 - 53.0 % Final     MCV   Date Value Ref Range Status   06/04/2020 86.2 80.0 - 100.0 fL Final     MCH   Date Value Ref Range Status   06/04/2020 29.0 26.0 - 34.0 pg Final     MCHC   Date Value Ref Range Status   06/04/2020 33.6 31.0 - 37.0  g/dL Final     RDW   Date Value Ref Range Status   06/04/2020 14.0 12.0 - 16.8 % Final     MPV   Date Value Ref Range Status   06/04/2020 11.3 (H) 6.7 - 10.8 fL Final     Platelets   Date Value Ref Range Status   06/04/2020 238 140 - 440 10*3/uL Final     Neutrophil Rel %   Date Value Ref Range Status   06/04/2020 46.0 45 - 80 % Final     Lymphocyte Rel %   Date Value Ref Range Status   06/04/2020 48.0 15 - 50 % Final     Monocyte Rel %   Date Value Ref Range Status   06/04/2020 5.0 0 - 15 % Final     Eosinophil %   Date Value Ref Range Status   06/04/2020 1.0 0 - 7 % Final     Basophil Rel %   Date Value Ref Range Status   02/26/2020 0.1 0 - 2 % Final     Immature Grans %   Date Value Ref Range Status   02/26/2020 0.3 (H) 0 % Final     Neutrophils Absolute   Date Value Ref Range Status   06/04/2020 3.98 1.5 - 7.5 /uL Final   06/04/2020 3.98 2.0 - 8.8 10*3/uL Final     Lymphocytes Absolute   Date Value Ref Range Status   06/04/2020 4.16 0.7 - 5.5 10*3/uL Final     Monocytes Absolute   Date Value Ref Range Status   06/04/2020 0.43 0.0 - 1.7 10*3/uL Final     Eosinophils Absolute   Date Value Ref Range Status   06/04/2020 0.09 0.0 - 0.8 10*3/uL Final     Basophils Absolute   Date Value Ref Range Status   02/26/2020 0.01 0.0 - 0.2 10*3/uL Final     Immature Grans, Absolute   Date Value Ref Range Status   02/26/2020 0.02 <1 10*3/uL Final     nRBC   Date Value Ref Range Status   06/04/2020 0 0 /100(WBC) Final       No results found for: HGBA1C    No results found for: EUPUTXUK36    No results found for: TSH    No results found for: CHOL  No results found for: TRIG  No results found for: HDL  No results found for: LDL  No results found for: VLDL  No results found for: LDLHDL      Procedures    Assessment/Plan   Problems Addressed this Visit        Digestive    Foreign body alimentary tract       Nervous and Auditory    Generalized abdominal pain - Primary    Relevant Orders    Ambulatory Referral to Gastroenterology           Orders Placed This Encounter   Procedures   • Ambulatory Referral to Gastroenterology     Referral Priority:   Routine     Referral Type:   Consultation     Referral Reason:   Specialty Services Required     Requested Specialty:   Gastroenterology     Number of Visits Requested:   1       Current Outpatient Medications   Medication Sig Dispense Refill   • amoxicillin (AMOXIL) 875 MG tablet Take 1 tablet by mouth 2 (Two) Times a Day. 20 tablet 0   • aspirin-acetaminophen-caffeine (EXCEDRIN MIGRAINE) 250-250-65 MG per tablet Take 1 tablet by mouth Daily. Take one tablet daily as needed. 60 tablet 3   • cetirizine (zyrTEC) 10 MG tablet Take 1 tablet by mouth Daily. 30 tablet 3   • cyclobenzaprine (FLEXERIL) 10 MG tablet Take 10 mg by mouth Daily.     • fenofibrate 160 MG tablet Take 160 mg by mouth Daily.     • mometasone (NASONEX) 50 MCG/ACT nasal spray 2 sprays into the nostril(s) as directed by provider Daily. 1 each 12   • omeprazole (PrilOSEC) 20 MG capsule Take 1 capsule by mouth Daily. 30 capsule 3     No current facility-administered medications for this visit.       I reviewed records from ER with pt.  No follow-ups on file.    Patient Instructions   Warning signs to ER discussed.  Will get GI consult and will order xray for Monday.       pt will fill the tramadol that was prescribed to him in the hospital.  Will get kub on Monday to recheck foreign body.    Time spent on visit:  14   minutes.  This patient has consented to a telehealth visit via video. The visit was scheduled as a video visit to comply with patient safety concerns in accordance with CDC recommendations.  All vitals recorded within this visit are reported by the patient.

## 2020-06-08 ENCOUNTER — TELEPHONE (OUTPATIENT)
Dept: FAMILY MEDICINE CLINIC | Facility: CLINIC | Age: 45
End: 2020-06-08

## 2020-06-08 ENCOUNTER — HOSPITAL ENCOUNTER (OUTPATIENT)
Dept: GENERAL RADIOLOGY | Facility: HOSPITAL | Age: 45
Discharge: HOME | End: 2020-06-08
Admitting: FAMILY MEDICINE

## 2020-06-08 DIAGNOSIS — T18.9XXA FOREIGN BODY IN DIGESTIVE TRACT, INITIAL ENCOUNTER: Primary | ICD-10-CM

## 2020-06-08 DIAGNOSIS — T18.9XXA FOREIGN BODY IN DIGESTIVE TRACT, INITIAL ENCOUNTER: ICD-10-CM

## 2020-06-08 PROCEDURE — 74018 RADEX ABDOMEN 1 VIEW: CPT

## 2020-06-08 NOTE — TELEPHONE ENCOUNTER
Patient is waiting for a phone call to let him know where to go get his X-Ray, he states he has been waiting since 9 am because Dr. Us informed him that he would be called by 9 am today. Please call patient back ASAP

## 2020-06-09 ENCOUNTER — TELEPHONE (OUTPATIENT)
Dept: FAMILY MEDICINE CLINIC | Facility: CLINIC | Age: 45
End: 2020-06-09

## 2020-06-09 RX ORDER — DICYCLOMINE HCL 20 MG
20 TABLET ORAL EVERY 6 HOURS
Qty: 30 TABLET | Refills: 1 | Status: SHIPPED | OUTPATIENT
Start: 2020-06-09 | End: 2021-02-25 | Stop reason: SDUPTHER

## 2020-06-09 NOTE — TELEPHONE ENCOUNTER
----- Message from oRseann Us MD sent at 6/8/2020  3:45 PM EDT -----  Let pt know the foreign body of concern is no longer present.  I am setting him up with a GI doctor as I discussed with him for further evaluation.

## 2020-06-09 NOTE — TELEPHONE ENCOUNTER
I called the patient with results, but had a very hard time understanding him. He has lots of questions and complaints regarding pain. I advised him we're setting him up with GI, but he insists on talking further with you. Can you please call him back to discuss these concerns?

## 2020-06-22 ENCOUNTER — OFFICE VISIT (OUTPATIENT)
Dept: GASTROENTEROLOGY | Facility: CLINIC | Age: 45
End: 2020-06-22

## 2020-06-22 VITALS — HEIGHT: 65 IN | TEMPERATURE: 97.9 F | BODY MASS INDEX: 24.16 KG/M2 | WEIGHT: 145 LBS

## 2020-06-22 DIAGNOSIS — R10.30 LOWER ABDOMINAL PAIN: Primary | ICD-10-CM

## 2020-06-22 PROCEDURE — 99204 OFFICE O/P NEW MOD 45 MIN: CPT | Performed by: INTERNAL MEDICINE

## 2020-06-22 NOTE — PROGRESS NOTES
Chief Complaint   Patient presents with   • Abdominal Pain       Smith Chisholm is a 45 y.o. male who presents with abdominal pain    45-year-old with right upper quadrant abdominal pain radiating into the right lower quadrant for 4 months.  This occurred 5 days after his appendix was removed and the pain continues.  Occasionally radiates into the right flank.  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 Take one tablet daily as needed., Disp: 60 tablet, Rfl: 3    No Known Allergies    Social History     Socioeconomic History   • Marital status:      Spouse name: Not on file   • Number of children: Not on file   • Years of education: Not on file   • Highest education level: Not on file   Tobacco Use   • Smoking status: Never Smoker   • Smokeless tobacco: Never Used   Substance and Sexual Activity   • Alcohol use: No     Frequency: Never   • Drug use: No   • Sexual activity: Defer       Family History   Problem Relation Age of Onset   • No Known Problems Mother        Review of  Systems   Gastrointestinal: Positive for abdominal distention and abdominal pain.   All other systems reviewed and are negative.      Vitals:    06/22/20 1338   Temp: 97.9 °F (36.6 °C)       Physical Exam   Constitutional: He is oriented to person, place, and time. He appears well-developed and well-nourished.   HENT:   Head: Normocephalic and atraumatic.   Eyes: Conjunctivae and EOM are normal.   Neck: Normal range of motion. No tracheal deviation present.   Cardiovascular: Normal rate and regular rhythm.   Pulmonary/Chest: Effort normal and breath sounds normal. No respiratory distress.   Abdominal: Soft. Bowel sounds are normal. He exhibits no distension and no mass. There is tenderness. There is no rebound and no guarding.   Musculoskeletal: Normal range of motion.   Neurological: He is alert and oriented to person, place, and time.   Skin: Skin is warm and dry.   Psychiatric: He has a normal mood and affect. Judgment normal.   Nursing note and vitals reviewed.    Problem list    Right lower quadrant pain  Right upper quadrant pain  Right flank pain  Abnormal imaging with ingested metallic foreign body which is now disappeared on further imaging  Abnormal CAT scan        Assessment/Plan    Plan for EGD and colonoscopy based on the above issues  Recent CBC CMP were normal does not need to be repeated  The ingested metallic foreign body which measured 6 mm in size and was found on CAT scan cannot be located on serial KUB imaging after the CAT scan it is likely passed from his colon  We will call in a Levsin sublingual to be used as needed

## 2020-06-26 ENCOUNTER — TELEMEDICINE (OUTPATIENT)
Dept: FAMILY MEDICINE CLINIC | Facility: CLINIC | Age: 45
End: 2020-06-26

## 2020-06-26 DIAGNOSIS — R10.84 GENERALIZED ABDOMINAL PAIN: Primary | ICD-10-CM

## 2020-06-26 DIAGNOSIS — G43.009 MIGRAINE WITHOUT AURA AND WITHOUT STATUS MIGRAINOSUS, NOT INTRACTABLE: ICD-10-CM

## 2020-06-26 PROCEDURE — 99213 OFFICE O/P EST LOW 20 MIN: CPT | Performed by: FAMILY MEDICINE

## 2020-06-26 RX ORDER — ACETAMINOPHEN, ASPIRIN AND CAFFEINE 250; 250; 65 MG/1; MG/1; MG/1
1 TABLET, FILM COATED ORAL EVERY 8 HOURS PRN
Qty: 60 TABLET | Refills: 4 | Status: SHIPPED | OUTPATIENT
Start: 2020-06-26 | End: 2022-03-03 | Stop reason: SDUPTHER

## 2020-06-26 NOTE — PROGRESS NOTES
Chief Complaint   Patient presents with   • Headache       Subjective   Smith Chisholm is a 45 y.o. male.     History of Present Illness   The patient presents today for follow-up via a video visit due to the COVID-19 pandemic for  abd pain- he will be getting scoped soon for cscope and EGD. Dr Osullivan.    Headaches occasionally and wants to something for this and has not tried any otc meds.  Some sound sensitivity              The following portions of the patient's history were reviewed and updated as appropriate: allergies, current medications, past family history, past medical history, past social history, past surgical history and problem list.    Review of Systems   Constitutional: Negative for fatigue.   Eyes: Negative for blurred vision.   Respiratory: Negative for cough, chest tightness and shortness of breath.    Cardiovascular: Negative for chest pain, palpitations and leg swelling.   Neurological: Positive for headache. Negative for dizziness and light-headedness.       Patient Active Problem List   Diagnosis   • Nephrolithiasis   • Migraine without aura and without status migrainosus, not intractable   • Hypertriglyceridemia   • Eczema   • Acute seasonal allergic rhinitis due to pollen   • Sore throat   • Bilateral chronic serous otitis media   • Generalized abdominal pain   • Primary insomnia   • Foreign body alimentary tract   • Migraine without aura and without status migrainosus, not intractable       No Known Allergies      Current Outpatient Medications:   •  aspirin-acetaminophen-caffeine (EXCEDRIN MIGRAINE) 250-250-65 MG per tablet, Take 1 tablet by mouth Daily. Take one tablet daily as needed., Disp: 60 tablet, Rfl: 3  •  aspirin-acetaminophen-caffeine (Excedrin Migraine) 250-250-65 MG per tablet, Take 1 tablet by mouth Every 8 (Eight) Hours As Needed for Headache., Disp: 60 tablet, Rfl: 4  •  dicyclomine (Bentyl) 20 MG tablet, Take 1 tablet by mouth Every 6 (Six) Hours., Disp: 30 tablet,  Rfl: 1  •  hyoscyamine (LEVSIN) 0.125 MG SL tablet, Take 1 tablet by mouth Every 4 (Four) Hours As Needed for Cramping., Disp: 60 tablet, Rfl: 5    Past Medical History:   Diagnosis Date   • Abdominal pain    • Acute seasonal allergic rhinitis due to pollen    • Chest pain    • Eczema    • Headache    • Hypertriglyceridemia    • Lumbar strain    • 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    • Myalgia    • Myositis    • Nephrolithiasis    • Paresthesia of left leg        Past Surgical History:   Procedure Laterality Date   • APPENDECTOMY         Family History   Problem Relation Age of Onset   • No Known Problems Mother        Social History     Tobacco Use   • Smoking status: Never Smoker   • Smokeless tobacco: Never Used   Substance Use Topics   • Alcohol use: No     Frequency: Never            Objective     There were no vitals taken for this visit. Video Visit    Physical Exam  NAD  AAOx3  No labored breathing.  EOMI   PERRLA      Lab Results   Component Value Date    BUN 13 06/04/2020    CREATININE 0.8 06/04/2020    BCR 16.3 06/04/2020    K 4.1 06/04/2020    CO2 18 (L) 06/04/2020    CALCIUM 9.1 06/04/2020    ALBUMIN 4.0 06/04/2020    LABIL2 1.0 (L) 06/04/2020    AST 34 06/04/2020    ALT 39 06/04/2020       WBC   Date Value Ref Range Status   06/04/2020 8.66 4.5 - 11.0 10*3/uL Final     RBC   Date Value Ref Range Status   06/04/2020 4.93 4.5 - 5.9 10*6/uL Final     Hemoglobin   Date Value Ref Range Status   06/04/2020 14.3 13.5 - 17.5 g/dL Final     Hematocrit   Date Value Ref Range Status   06/04/2020 42.5 41.0 - 53.0 % Final     MCV   Date Value Ref Range Status   06/04/2020 86.2 80.0 - 100.0 fL Final     MCH   Date Value Ref Range Status   06/04/2020 29.0 26.0 - 34.0 pg Final     MCHC   Date Value Ref Range Status   06/04/2020 33.6 31.0 - 37.0 g/dL Final     RDW   Date Value Ref Range Status   06/04/2020 14.0 12.0 - 16.8 % Final     MPV   Date Value Ref Range Status   06/04/2020 11.3 (H) 6.7 - 10.8 fL Final     Platelets    Date Value Ref Range Status   06/04/2020 238 140 - 440 10*3/uL Final     Neutrophil Rel %   Date Value Ref Range Status   06/04/2020 46.0 45 - 80 % Final     Lymphocyte Rel %   Date Value Ref Range Status   06/04/2020 48.0 15 - 50 % Final     Monocyte Rel %   Date Value Ref Range Status   06/04/2020 5.0 0 - 15 % Final     Eosinophil %   Date Value Ref Range Status   06/04/2020 1.0 0 - 7 % Final     Basophil Rel %   Date Value Ref Range Status   02/26/2020 0.1 0 - 2 % Final     Immature Grans %   Date Value Ref Range Status   02/26/2020 0.3 (H) 0 % Final     Neutrophils Absolute   Date Value Ref Range Status   06/04/2020 3.98 1.5 - 7.5 /uL Final   06/04/2020 3.98 2.0 - 8.8 10*3/uL Final     Lymphocytes Absolute   Date Value Ref Range Status   06/04/2020 4.16 0.7 - 5.5 10*3/uL Final     Monocytes Absolute   Date Value Ref Range Status   06/04/2020 0.43 0.0 - 1.7 10*3/uL Final     Eosinophils Absolute   Date Value Ref Range Status   06/04/2020 0.09 0.0 - 0.8 10*3/uL Final     Basophils Absolute   Date Value Ref Range Status   02/26/2020 0.01 0.0 - 0.2 10*3/uL Final     Immature Grans, Absolute   Date Value Ref Range Status   02/26/2020 0.02 <1 10*3/uL Final     nRBC   Date Value Ref Range Status   06/04/2020 0 0 /100(WBC) Final       No results found for: HGBA1C    No results found for: [id number]    No results found for: TSH    No results found for: CHOL  No results found for: TRIG  No results found for: HDL  No results found for: LDL  No results found for: VLDL  No results found for: LDLHDL      Procedures    Assessment/Plan   Problems Addressed this Visit        Cardiovascular and Mediastinum    Migraine without aura and without status migrainosus, not intractable    Relevant Medications    aspirin-acetaminophen-caffeine (Excedrin Migraine) 250-250-65 MG per tablet       Nervous and Auditory    Generalized abdominal pain - Primary          No orders of the defined types were placed in this encounter.      Current  Outpatient Medications   Medication Sig Dispense Refill   • aspirin-acetaminophen-caffeine (EXCEDRIN MIGRAINE) 250-250-65 MG per tablet Take 1 tablet by mouth Daily. Take one tablet daily as needed. 60 tablet 3   • aspirin-acetaminophen-caffeine (Excedrin Migraine) 250-250-65 MG per tablet Take 1 tablet by mouth Every 8 (Eight) Hours As Needed for Headache. 60 tablet 4   • dicyclomine (Bentyl) 20 MG tablet Take 1 tablet by mouth Every 6 (Six) Hours. 30 tablet 1   • hyoscyamine (LEVSIN) 0.125 MG SL tablet Take 1 tablet by mouth Every 4 (Four) Hours As Needed for Cramping. 60 tablet 5     No current facility-administered medications for this visit.        No follow-ups on file.    There are no Patient Instructions on file for this visit.     start excedrin migraine.  Follow up if no better.      Time spent on visit:  14   minutes.  This patient has consented to a telehealth visit via video. The visit was scheduled as a video visit to comply with patient safety concerns in accordance with CDC recommendations.  All vitals recorded within this visit are reported by the patient.

## 2020-07-08 ENCOUNTER — TELEPHONE (OUTPATIENT)
Dept: GASTROENTEROLOGY | Facility: CLINIC | Age: 45
End: 2020-07-08

## 2020-07-08 ENCOUNTER — OUTSIDE FACILITY SERVICE (OUTPATIENT)
Dept: GASTROENTEROLOGY | Facility: CLINIC | Age: 45
End: 2020-07-08

## 2020-07-08 PROCEDURE — 45378 DIAGNOSTIC COLONOSCOPY: CPT | Performed by: INTERNAL MEDICINE

## 2020-07-08 PROCEDURE — 43239 EGD BIOPSY SINGLE/MULTIPLE: CPT | Performed by: INTERNAL MEDICINE

## 2020-07-08 RX ORDER — PANTOPRAZOLE SODIUM 40 MG/1
40 TABLET, DELAYED RELEASE ORAL DAILY
Qty: 30 TABLET | Refills: 12 | Status: SHIPPED | OUTPATIENT
Start: 2020-07-08 | End: 2020-11-17 | Stop reason: SDUPTHER

## 2020-07-08 NOTE — TELEPHONE ENCOUNTER
----- Message from Francesco Chowdhury MD sent at 7/8/2020 11:42 AM EDT -----  Regarding: rx  Call in protonix 40mg po qam #[id number] [phone] rf

## 2020-07-13 ENCOUNTER — OFFICE VISIT (OUTPATIENT)
Dept: FAMILY MEDICINE CLINIC | Facility: CLINIC | Age: 45
End: 2020-07-13

## 2020-07-13 VITALS
BODY MASS INDEX: 27.49 KG/M2 | SYSTOLIC BLOOD PRESSURE: 120 MMHG | HEIGHT: 65 IN | DIASTOLIC BLOOD PRESSURE: 88 MMHG | HEART RATE: 78 BPM | WEIGHT: 165 LBS | TEMPERATURE: 98 F | OXYGEN SATURATION: 98 %

## 2020-07-13 DIAGNOSIS — K25.7 CHRONIC GASTRIC ULCER WITHOUT HEMORRHAGE AND WITHOUT PERFORATION: Primary | ICD-10-CM

## 2020-07-13 PROCEDURE — 99213 OFFICE O/P EST LOW 20 MIN: CPT | Performed by: FAMILY MEDICINE

## 2020-07-13 NOTE — PROGRESS NOTES
Chief Complaint   Patient presents with   • Colonoscopy     Pt had a colonoscopy on July 8th and was told to follow up with PCP regarding ulcer        Subjective   Smith Chisholm is a 45 y.o. male.     History of Present Illness   PT is here for follow up for EGD and was told he has gastric ulcers and was put on pantoprazole. He is still having abd pains that are not getting better.  cscope showed hemorrhoid and otherwise was normal.The following portions of the patient's history were reviewed and updated as appropriate: allergies, current medications, past family history, past medical history, past social history, past surgical history and problem list.    Review of Systems   Constitutional: Negative for fatigue.   Eyes: Negative for blurred vision.   Respiratory: Negative for cough, chest tightness and shortness of breath.    Cardiovascular: Negative for chest pain, palpitations and leg swelling.   Neurological: Negative for dizziness, light-headedness and headache.       Patient Active Problem List   Diagnosis   • Nephrolithiasis   • Migraine without aura and without status migrainosus, not intractable   • Hypertriglyceridemia   • Eczema   • Acute seasonal allergic rhinitis due to pollen   • Sore throat   • Bilateral chronic serous otitis media   • Generalized abdominal pain   • Primary insomnia   • Foreign body alimentary tract   • Migraine without aura and without status migrainosus, not intractable   • Chronic gastric ulcer without hemorrhage and without perforation       No Known Allergies      Current Outpatient Medications:   •  aspirin-acetaminophen-caffeine (EXCEDRIN MIGRAINE) 250-250-65 MG per tablet, Take 1 tablet by mouth Daily. Take one tablet daily as needed., Disp: 60 tablet, Rfl: 3  •  aspirin-acetaminophen-caffeine (Excedrin Migraine) 250-250-65 MG per tablet, Take 1 tablet by mouth Every 8 (Eight) Hours As Needed for Headache., Disp: 60 tablet, Rfl: 4  •  dicyclomine (Bentyl) 20 MG tablet, Take  "1 tablet by mouth Every 6 (Six) Hours., Disp: 30 tablet, Rfl: 1  •  hyoscyamine (LEVSIN) 0.125 MG SL tablet, Take 1 tablet by mouth Every 4 (Four) Hours As Needed for Cramping., Disp: 60 tablet, Rfl: 5  •  pantoprazole (PROTONIX) 40 MG EC tablet, Take 1 tablet by mouth Daily., Disp: 30 tablet, Rfl: 12    Past Medical History:   Diagnosis Date   • Abdominal pain    • Acute seasonal allergic rhinitis due to pollen    • Chest pain    • Eczema    • Headache    • Hypertriglyceridemia    • Lumbar strain    • 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    • Myalgia    • Myositis    • Nephrolithiasis    • Paresthesia of left leg        Past Surgical History:   Procedure Laterality Date   • APPENDECTOMY         Family History   Problem Relation Age of Onset   • No Known Problems Mother        Social History     Tobacco Use   • Smoking status: Never Smoker   • Smokeless tobacco: Never Used   Substance Use Topics   • Alcohol use: No     Frequency: Never            Objective     Visit Vitals  /88   Pulse 78   Temp 98 °F (36.7 °C)   Ht 165.1 cm (65\")   Wt 74.8 kg (165 lb)   SpO2 98%   BMI 27.46 kg/m²       Physical Exam   Cardiovascular: Normal rate, regular rhythm and normal heart sounds.   Pulmonary/Chest: Effort normal and breath sounds normal. No stridor. No respiratory distress.   Nursing note and vitals reviewed.      Lab Results   Component Value Date    BUN 13 06/04/2020    CREATININE 0.8 06/04/2020    BCR 16.3 06/04/2020    K 4.1 06/04/2020    CO2 18 (L) 06/04/2020    CALCIUM 9.1 06/04/2020    ALBUMIN 4.0 06/04/2020    LABIL2 1.0 (L) 06/04/2020    AST 34 06/04/2020    ALT 39 06/04/2020       WBC   Date Value Ref Range Status   06/04/2020 8.66 4.5 - 11.0 10*3/uL Final     RBC   Date Value Ref Range Status   06/04/2020 4.93 4.5 - 5.9 10*6/uL Final     Hemoglobin   Date Value Ref Range Status   06/04/2020 14.3 13.5 - 17.5 g/dL Final     Hematocrit   Date Value Ref Range Status   06/04/2020 42.5 41.0 - 53.0 % Final     MCV   Date Value " Ref Range Status   06/04/2020 86.2 80.0 - 100.0 fL Final     MCH   Date Value Ref Range Status   06/04/2020 29.0 26.0 - 34.0 pg Final     MCHC   Date Value Ref Range Status   06/04/2020 33.6 31.0 - 37.0 g/dL Final     RDW   Date Value Ref Range Status   06/04/2020 14.0 12.0 - 16.8 % Final     MPV   Date Value Ref Range Status   06/04/2020 11.3 (H) 6.7 - 10.8 fL Final     Platelets   Date Value Ref Range Status   06/04/2020 238 140 - 440 10*3/uL Final     Neutrophil Rel %   Date Value Ref Range Status   06/04/2020 46.0 45 - 80 % Final     Lymphocyte Rel %   Date Value Ref Range Status   06/04/2020 48.0 15 - 50 % Final     Monocyte Rel %   Date Value Ref Range Status   06/04/2020 5.0 0 - 15 % Final     Eosinophil %   Date Value Ref Range Status   06/04/2020 1.0 0 - 7 % Final     Basophil Rel %   Date Value Ref Range Status   02/26/2020 0.1 0 - 2 % Final     Immature Grans %   Date Value Ref Range Status   02/26/2020 0.3 (H) 0 % Final     Neutrophils Absolute   Date Value Ref Range Status   06/04/2020 3.98 1.5 - 7.5 /uL Final   06/04/2020 3.98 2.0 - 8.8 10*3/uL Final     Lymphocytes Absolute   Date Value Ref Range Status   06/04/2020 4.16 0.7 - 5.5 10*3/uL Final     Monocytes Absolute   Date Value Ref Range Status   06/04/2020 0.43 0.0 - 1.7 10*3/uL Final     Eosinophils Absolute   Date Value Ref Range Status   06/04/2020 0.09 0.0 - 0.8 10*3/uL Final     Basophils Absolute   Date Value Ref Range Status   02/26/2020 0.01 0.0 - 0.2 10*3/uL Final     Immature Grans, Absolute   Date Value Ref Range Status   02/26/2020 0.02 <1 10*3/uL Final     nRBC   Date Value Ref Range Status   06/04/2020 0 0 /100(WBC) Final       No results found for: HGBA1C    No results found for: [id number]    No results found for: TSH    No results found for: CHOL  No results found for: TRIG  No results found for: HDL  No results found for: LDL  No results found for: VLDL  No results found for: LDLHDL      Procedures    Assessment/Plan   Problems  Addressed this Visit        Digestive    Chronic gastric ulcer without hemorrhage and without perforation - Primary          No orders of the defined types were placed in this encounter.      Current Outpatient Medications   Medication Sig Dispense Refill   • aspirin-acetaminophen-caffeine (EXCEDRIN MIGRAINE) 250-250-65 MG per tablet Take 1 tablet by mouth Daily. Take one tablet daily as needed. 60 tablet 3   • aspirin-acetaminophen-caffeine (Excedrin Migraine) 250-250-65 MG per tablet Take 1 tablet by mouth Every 8 (Eight) Hours As Needed for Headache. 60 tablet 4   • dicyclomine (Bentyl) 20 MG tablet Take 1 tablet by mouth Every 6 (Six) Hours. 30 tablet 1   • hyoscyamine (LEVSIN) 0.125 MG SL tablet Take 1 tablet by mouth Every 4 (Four) Hours As Needed for Cramping. 60 tablet 5   • pantoprazole (PROTONIX) 40 MG EC tablet Take 1 tablet by mouth Daily. 30 tablet 12     No current facility-administered medications for this visit.      Pt to follow up with GI doctor for his pain. Continue pantoprazole  No follow-ups on file.  Discussed foods to avoid with pt.    There are no Patient Instructions on file for this visit.

## 2020-07-21 ENCOUNTER — TELEPHONE (OUTPATIENT)
Dept: GASTROENTEROLOGY | Facility: CLINIC | Age: 45
End: 2020-07-21

## 2020-07-21 NOTE — TELEPHONE ENCOUNTER
----- Message from Francesco Chowdhury MD sent at 7/17/2020  2:37 PM EDT -----  Pathology benign  Colonoscopy recall 10 years  Office visit Paula 4 weeks

## 2020-07-22 NOTE — TELEPHONE ENCOUNTER
Patient called, advised as per Dr. Chowdhury's note. He verb understanding. States he is still having pain. F/u visit scheduled with Paula on 7/31@7448.

## 2020-07-31 ENCOUNTER — OFFICE VISIT (OUTPATIENT)
Dept: GASTROENTEROLOGY | Facility: CLINIC | Age: 45
End: 2020-07-31

## 2020-07-31 VITALS — BODY MASS INDEX: 27.32 KG/M2 | WEIGHT: 164 LBS | TEMPERATURE: 98.6 F | HEIGHT: 65 IN

## 2020-07-31 DIAGNOSIS — K25.9 MULTIPLE GASTRIC ULCERS: ICD-10-CM

## 2020-07-31 DIAGNOSIS — R10.11 RIGHT UPPER QUADRANT ABDOMINAL PAIN: Primary | ICD-10-CM

## 2020-07-31 PROCEDURE — 99214 OFFICE O/P EST MOD 30 MIN: CPT | Performed by: NURSE PRACTITIONER

## 2020-07-31 NOTE — PROGRESS NOTES
Chief Complaint   Patient presents with   • Follow-up   • Abdominal Pain     HPI    Smith Chisholm is a  45 y.o. male here for a follow up visit for abdominal pain.  This patient follows with Dr. Chowdhury, new to me.  Patient evaluated recently for several months of abdominal pain.  Onset 5 days after his appendix was removed. CAT scan of the abdomen was normal except for a metallic density in the small bowel which on further imaging KUB a few days later showed resolution of this metallic foreign body meaning it was likely something ingested and he passed it from the colon.    Subsequent endoscopic evaluation reviewed dated 7/8/2020:    EGD w/ nonbleeding gastric ulcers otherwise normal.  Colonoscopy with normal ileum and nonbleeding internal hemorrhoids.  Recall 10 years.  Pathology was benign.    Today his primary complaint is right upper quadrant abdominal pain colicky in nature described as an ache.  Epigastric pain has resolved with treatment for gastric ulcers.  No nausea, vomiting, or dysphagia.  His appetite is good.  His weight is stable.  He still has his gallbladder.    No diarrhea, constipation, or rectal bleeding.    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 Take one tablet daily as needed. 60 tablet 3   • aspirin-acetaminophen-caffeine (Excedrin Migraine) 250-250-65 MG per tablet Take 1 tablet by mouth Every 8 (Eight) Hours As Needed for Headache. 60 tablet 4   •  dicyclomine (Bentyl) 20 MG tablet Take 1 tablet by mouth Every 6 (Six) Hours. 30 tablet 1   • hyoscyamine (LEVSIN) 0.125 MG SL tablet Take 1 tablet by mouth Every 4 (Four) Hours As Needed for Cramping. 60 tablet 5   • pantoprazole (PROTONIX) 40 MG EC tablet Take 1 tablet by mouth Daily. 30 tablet 12     No facility-administered encounter medications on file as of 7/31/2020.        Continuous Infusions:  No current facility-administered medications for this visit.     PRN Meds:.    No Known Allergies    Social History     Socioeconomic History   • Marital status:      Spouse name: Not on file   • Number of children: Not on file   • Years of education: Not on file   • Highest education level: Not on file   Tobacco Use   • Smoking status: Never Smoker   • Smokeless tobacco: Never Used   Substance and Sexual Activity   • Alcohol use: No     Frequency: Never   • Drug use: No   • Sexual activity: Defer       Family History   Problem Relation Age of Onset   • No Known Problems Mother        Review of Systems   Constitutional: Negative for activity change, appetite change, fatigue, fever and unexpected weight change.   HENT: Negative for trouble swallowing.    Respiratory: Negative for apnea, cough, choking, chest tightness, shortness of breath and wheezing.    Cardiovascular: Negative for chest pain, palpitations and leg swelling.   Gastrointestinal: Positive for abdominal pain. Negative for abdominal distention, anal bleeding, blood in stool, constipation, diarrhea, nausea, rectal pain and vomiting.       Vitals:    07/31/20 1300   Temp: 98.6 °F (37 °C)       Physical Exam   Constitutional: He is oriented to person, place, and time. He appears well-developed and well-nourished.   Eyes: Pupils are equal, round, and reactive to light.   Cardiovascular: Normal rate, regular rhythm and normal heart sounds.   Pulmonary/Chest: Effort normal and breath sounds normal. No respiratory distress. He has no wheezes.    Abdominal: Soft. Bowel sounds are normal. He exhibits no distension and no mass. There is tenderness. There is no guarding. No hernia.   Musculoskeletal: Normal range of motion.   Neurological: He is alert and oriented to person, place, and time.   Skin: Skin is warm and dry. Capillary refill takes less than 2 seconds.   Psychiatric: He has a normal mood and affect. His behavior is normal.     No radiology results for the last 7 days    Smith was seen today for follow-up and abdominal pain.    Diagnoses and all orders for this visit:    Right upper quadrant abdominal pain  -     NM HIDA Scan With Pharmacological Intervention; Future    Multiple gastric ulcers    Assessment/plan    Pleasant 45-year-old male seen today in follow-up status post endoscopic evaluation which I reviewed with the patient.  All questions were answered.  Epigastric pain resolved with treatment for gastric ulcers.  Still with right upper quadrant abdominal pain colicky in nature described as a mild ache no change with PPI therapy.  He is tender in the right upper quadrant on physical exam.  Recommend HIDA scan to rule out biliary dyskinesia.  Continue PPI therapy in the interim.  Follow-up and further recommendations pending imaging results.

## 2020-08-10 ENCOUNTER — HOSPITAL ENCOUNTER (OUTPATIENT)
Dept: NUCLEAR MEDICINE | Facility: HOSPITAL | Age: 45
Discharge: HOME | End: 2020-08-10

## 2020-08-10 DIAGNOSIS — R10.11 RIGHT UPPER QUADRANT ABDOMINAL PAIN: ICD-10-CM

## 2020-08-10 PROCEDURE — 0 TECHNETIUM TC 99M MEBROFENIN KIT: Performed by: NURSE PRACTITIONER

## 2020-08-10 PROCEDURE — 25010000002 SINCALIDE PER 5 MCG: Performed by: NURSE PRACTITIONER

## 2020-08-10 PROCEDURE — 78227 HEPATOBIL SYST IMAGE W/DRUG: CPT

## 2020-08-10 PROCEDURE — A9537 TC99M MEBROFENIN: HCPCS | Performed by: NURSE PRACTITIONER

## 2020-08-10 RX ORDER — KIT FOR THE PREPARATION OF TECHNETIUM TC 99M MEBROFENIN 45 MG/10ML
1 INJECTION, POWDER, LYOPHILIZED, FOR SOLUTION INTRAVENOUS
Status: COMPLETED | OUTPATIENT
Start: 2020-08-10 | End: 2020-08-10

## 2020-08-10 RX ADMIN — SINCALIDE 1.5 MCG: 5 INJECTION, POWDER, LYOPHILIZED, FOR SOLUTION INTRAVENOUS at 10:31

## 2020-08-10 RX ADMIN — MEBROFENIN 1 DOSE: 45 INJECTION, POWDER, LYOPHILIZED, FOR SOLUTION INTRAVENOUS at 08:45

## 2020-08-12 ENCOUNTER — TELEPHONE (OUTPATIENT)
Dept: GASTROENTEROLOGY | Facility: CLINIC | Age: 45
End: 2020-08-12

## 2020-08-12 DIAGNOSIS — K25.9 MULTIPLE GASTRIC ULCERS: Primary | ICD-10-CM

## 2020-08-12 NOTE — TELEPHONE ENCOUNTER
This patient has been seen for several months of abdominal pain onset 5 days after he had his appendix removed.  CAT scan of the abdomen was normal except for a metallic density in the small bowel which was gone on follow-up KUB.  He then had bidirectional endoscopic evaluation the first week in July with nonbleeding gastric ulcers and essentially normal colonoscopy.  I saw him recently for right upper quadrant abdominal pain colicky in nature.  Epigastric pain had resolved.  I sent him for a HIDA scan which came back normal with an ejection fraction of 61%.  Should we take another look to see if these ulcers have healed?  Versus trial of Carafate twice daily for 6 weeks.  Your thoughts?

## 2020-08-12 NOTE — TELEPHONE ENCOUNTER
----- Message from TRENT Pino sent at 8/10/2020  4:21 PM EDT -----  Please notify the patient that his HIDA scan is normal.  Continue PPI therapy.  Follow-up 1 month.

## 2020-08-12 NOTE — TELEPHONE ENCOUNTER
Called pt and advised of Paula's note. Pt verb understanding, but reports he is  Still having constant right sided pain.  He does report that his bm's are normal and he has been taking the pantoprazole.  He is asking what else can he do or take.  Advised will send message to Paula HARTMANN.

## 2020-08-17 ENCOUNTER — OFFICE VISIT (OUTPATIENT)
Dept: FAMILY MEDICINE CLINIC | Facility: CLINIC | Age: 45
End: 2020-08-17

## 2020-08-17 VITALS
OXYGEN SATURATION: 97 % | BODY MASS INDEX: 26.26 KG/M2 | HEIGHT: 65 IN | TEMPERATURE: 98.7 F | SYSTOLIC BLOOD PRESSURE: 118 MMHG | WEIGHT: 157.6 LBS | HEART RATE: 60 BPM | DIASTOLIC BLOOD PRESSURE: 70 MMHG

## 2020-08-17 DIAGNOSIS — Z00.00 PHYSICAL EXAM: Primary | ICD-10-CM

## 2020-08-17 DIAGNOSIS — Z13.220 ENCOUNTER FOR LIPID SCREENING FOR CARDIOVASCULAR DISEASE: ICD-10-CM

## 2020-08-17 DIAGNOSIS — E55.9 VITAMIN D DEFICIENCY: ICD-10-CM

## 2020-08-17 DIAGNOSIS — Z13.6 ENCOUNTER FOR LIPID SCREENING FOR CARDIOVASCULAR DISEASE: ICD-10-CM

## 2020-08-17 PROCEDURE — 99396 PREV VISIT EST AGE 40-64: CPT | Performed by: FAMILY MEDICINE

## 2020-08-17 RX ORDER — SUCRALFATE 1 G/1
1 TABLET ORAL 2 TIMES DAILY
Qty: 60 TABLET | Refills: 0 | Status: SHIPPED | OUTPATIENT
Start: 2020-08-17 | End: 2021-01-18

## 2020-08-17 NOTE — PROGRESS NOTES
Chief Complaint   Patient presents with   • Annual Exam     Pt is here for wellness exam.        Dony Chisholm is a 45 y.o. male.     History of Present Illness   PT is here for CPE and he is not fasting.   The following portions of the patient's history were reviewed and updated as appropriate: allergies, current medications, past family history, past medical history, past social history, past surgical history and problem list.    Review of Systems   Constitutional: Negative for fatigue.   Eyes: Negative for blurred vision.   Respiratory: Negative for cough, chest tightness and shortness of breath.    Cardiovascular: Negative for chest pain, palpitations and leg swelling.   Neurological: Negative for dizziness, light-headedness and headache.   All other systems reviewed and are negative.      Patient Active Problem List   Diagnosis   • Nephrolithiasis   • Migraine without aura and without status migrainosus, not intractable   • Hypertriglyceridemia   • Eczema   • Acute seasonal allergic rhinitis due to pollen   • Sore throat   • Bilateral chronic serous otitis media   • Generalized abdominal pain   • Primary insomnia   • Foreign body alimentary tract   • Migraine without aura and without status migrainosus, not intractable   • Chronic gastric ulcer without hemorrhage and without perforation       No Known Allergies      Current Outpatient Medications:   •  pantoprazole (PROTONIX) 40 MG EC tablet, Take 1 tablet by mouth Daily., Disp: 30 tablet, Rfl: 12  •  aspirin-acetaminophen-caffeine (EXCEDRIN MIGRAINE) 250-250-65 MG per tablet, Take 1 tablet by mouth Daily. Take one tablet daily as needed., Disp: 60 tablet, Rfl: 3  •  aspirin-acetaminophen-caffeine (Excedrin Migraine) 250-250-65 MG per tablet, Take 1 tablet by mouth Every 8 (Eight) Hours As Needed for Headache., Disp: 60 tablet, Rfl: 4  •  dicyclomine (Bentyl) 20 MG tablet, Take 1 tablet by mouth Every 6 (Six) Hours., Disp: 30 tablet, Rfl: 1  •   "hyoscyamine (LEVSIN) 0.125 MG SL tablet, Take 1 tablet by mouth Every 4 (Four) Hours As Needed for Cramping., Disp: 60 tablet, Rfl: 5    Past Medical History:   Diagnosis Date   • Abdominal pain    • Acute seasonal allergic rhinitis due to pollen    • Chest pain    • Eczema    • Headache    • Hypertriglyceridemia    • Lumbar strain    • 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    • Myalgia    • Myositis    • Nephrolithiasis    • Paresthesia of left leg        Past Surgical History:   Procedure Laterality Date   • APPENDECTOMY         Family History   Problem Relation Age of Onset   • No Known Problems Mother        Social History     Tobacco Use   • Smoking status: Never Smoker   • Smokeless tobacco: Never Used   Substance Use Topics   • Alcohol use: No     Frequency: Never            Objective     Visit Vitals  /70   Pulse 60   Temp 98.7 °F (37.1 °C)   Ht 165.1 cm (65\")   Wt 71.5 kg (157 lb 9.6 oz)   SpO2 97%   BMI 26.23 kg/m²       Physical Exam   Constitutional: He is oriented to person, place, and time. He appears well-developed and well-nourished. No distress.   HENT:   Head: Normocephalic and atraumatic.   Right Ear: External ear normal.   Left Ear: External ear normal.   Nose: Nose normal.   Mouth/Throat: Oropharynx is clear and moist.   Eyes: Pupils are equal, round, and reactive to light. Conjunctivae, EOM and lids are normal. Right eye exhibits no discharge. Left eye exhibits no discharge. No scleral icterus.   Neck: Trachea normal and normal range of motion. Neck supple. No JVD present. Carotid bruit is not present. No tracheal deviation present. No thyroid mass and no thyromegaly present.   Cardiovascular: Normal rate, regular rhythm and normal heart sounds. Exam reveals no gallop and no friction rub.   No murmur heard.  Pulmonary/Chest: Effort normal and breath sounds normal. No stridor. No respiratory distress. He has no wheezes. He has no rales. He exhibits no tenderness.   Abdominal: Soft. Bowel sounds are " normal. He exhibits no distension and no mass. There is no tenderness. There is no rebound and no guarding. No hernia.   Musculoskeletal: Normal range of motion. He exhibits no edema, tenderness or deformity.   Lymphadenopathy:     He has no cervical adenopathy.   Neurological: He is alert and oriented to person, place, and time. He is not disoriented. He displays normal reflexes. No sensory deficit. Coordination normal.   Skin: Skin is warm and dry. No rash noted. He is not diaphoretic. No erythema. No pallor.   Psychiatric: He has a normal mood and affect. His speech is normal and behavior is normal. Judgment and thought content normal. He is attentive.   Nursing note and vitals reviewed.      Lab Results   Component Value Date    BUN 13 06/04/2020    CREATININE 0.8 06/04/2020    BCR 16.3 06/04/2020    K 4.1 06/04/2020    CO2 18 (L) 06/04/2020    CALCIUM 9.1 06/04/2020    ALBUMIN 4.0 06/04/2020    LABIL2 1.0 (L) 06/04/2020    AST 34 06/04/2020    ALT 39 06/04/2020       WBC   Date Value Ref Range Status   06/04/2020 8.66 4.5 - 11.0 10*3/uL Final     RBC   Date Value Ref Range Status   06/04/2020 4.93 4.5 - 5.9 10*6/uL Final     Hemoglobin   Date Value Ref Range Status   06/04/2020 14.3 13.5 - 17.5 g/dL Final     Hematocrit   Date Value Ref Range Status   06/04/2020 42.5 41.0 - 53.0 % Final     MCV   Date Value Ref Range Status   06/04/2020 86.2 80.0 - 100.0 fL Final     MCH   Date Value Ref Range Status   06/04/2020 29.0 26.0 - 34.0 pg Final     MCHC   Date Value Ref Range Status   06/04/2020 33.6 31.0 - 37.0 g/dL Final     RDW   Date Value Ref Range Status   06/04/2020 14.0 12.0 - 16.8 % Final     MPV   Date Value Ref Range Status   06/04/2020 11.3 (H) 6.7 - 10.8 fL Final     Platelets   Date Value Ref Range Status   06/04/2020 238 140 - 440 10*3/uL Final     Neutrophil Rel %   Date Value Ref Range Status   06/04/2020 46.0 45 - 80 % Final     Lymphocyte Rel %   Date Value Ref Range Status   06/04/2020 48.0 15 -  50 % Final     Monocyte Rel %   Date Value Ref Range Status   06/04/2020 5.0 0 - 15 % Final     Eosinophil %   Date Value Ref Range Status   06/04/2020 1.0 0 - 7 % Final     Basophil Rel %   Date Value Ref Range Status   02/26/2020 0.1 0 - 2 % Final     Immature Grans %   Date Value Ref Range Status   02/26/2020 0.3 (H) 0 % Final     Neutrophils Absolute   Date Value Ref Range Status   06/04/2020 3.98 1.5 - 7.5 /uL Final   06/04/2020 3.98 2.0 - 8.8 10*3/uL Final     Lymphocytes Absolute   Date Value Ref Range Status   06/04/2020 4.16 0.7 - 5.5 10*3/uL Final     Monocytes Absolute   Date Value Ref Range Status   06/04/2020 0.43 0.0 - 1.7 10*3/uL Final     Eosinophils Absolute   Date Value Ref Range Status   06/04/2020 0.09 0.0 - 0.8 10*3/uL Final     Basophils Absolute   Date Value Ref Range Status   02/26/2020 0.01 0.0 - 0.2 10*3/uL Final     Immature Grans, Absolute   Date Value Ref Range Status   02/26/2020 0.02 <1 10*3/uL Final     nRBC   Date Value Ref Range Status   06/04/2020 0 0 /100(WBC) Final       No results found for: HGBA1C    No results found for: ASHEUTLO90    No results found for: TSH    No results found for: CHOL  No results found for: TRIG  No results found for: HDL  No results found for: LDL  No results found for: VLDL  No results found for: LDLHDL      Procedures    Assessment/Plan   Problems Addressed this Visit     None      Visit Diagnoses     Physical exam    -  Primary    Relevant Orders    Comprehensive Metabolic Panel    Lipid Panel    TSH Rfx On Abnormal To Free T4    Vitamin B12    Folate    Encounter for lipid screening for cardiovascular disease        Relevant Orders    Comprehensive Metabolic Panel    Lipid Panel    TSH Rfx On Abnormal To Free T4    Vitamin B12    Folate    Vitamin D deficiency        Relevant Orders    Vitamin D 25 Hydroxy          Orders Placed This Encounter   Procedures   • Comprehensive Metabolic Panel   • Lipid Panel   • TSH Rfx On Abnormal To Free T4   •  Vitamin B12   • Folate   • Vitamin D 25 Hydroxy       Current Outpatient Medications   Medication Sig Dispense Refill   • pantoprazole (PROTONIX) 40 MG EC tablet Take 1 tablet by mouth Daily. 30 tablet 12   • aspirin-acetaminophen-caffeine (EXCEDRIN MIGRAINE) 250-250-65 MG per tablet Take 1 tablet by mouth Daily. Take one tablet daily as needed. 60 tablet 3   • aspirin-acetaminophen-caffeine (Excedrin Migraine) 250-250-65 MG per tablet Take 1 tablet by mouth Every 8 (Eight) Hours As Needed for Headache. 60 tablet 4   • dicyclomine (Bentyl) 20 MG tablet Take 1 tablet by mouth Every 6 (Six) Hours. 30 tablet 1   • hyoscyamine (LEVSIN) 0.125 MG SL tablet Take 1 tablet by mouth Every 4 (Four) Hours As Needed for Cramping. 60 tablet 5     No current facility-administered medications for this visit.      CPE done and work on exercise and diet.    Will rto for fasting labs on Friday.  No follow-ups on file.    Patient Instructions   Continue diet and exercise.

## 2020-08-17 NOTE — TELEPHONE ENCOUNTER
Please notify the patient that Dr. Chowdhury would like to start him on medication to help heal ulcers. Please escribe Carafate b.i.d. times four weeks and we also need to repeat upper endoscopy to reassess in mid September. Please place case request and schedule this for the patient.      Per Paula HARTMANN.   Advised of the note above.  Carafate escribed to pt's pharmacy.  Pt verb understanding.     Egd case request placed, .

## 2020-08-24 DIAGNOSIS — E78.1 HYPERTRIGLYCERIDEMIA: ICD-10-CM

## 2020-08-24 DIAGNOSIS — E55.9 VITAMIN D DEFICIENCY: Primary | ICD-10-CM

## 2020-08-24 LAB
25(OH)D3+25(OH)D2 SERPL-MCNC: 11.7 NG/ML (ref 30–100)
ALBUMIN SERPL-MCNC: 4.2 G/DL (ref 3.5–5.2)
ALBUMIN/GLOB SERPL: 1.8 G/DL
ALP SERPL-CCNC: 121 U/L (ref 39–117)
ALT SERPL-CCNC: 23 U/L (ref 1–41)
AST SERPL-CCNC: 20 U/L (ref 1–40)
BILIRUB SERPL-MCNC: 0.5 MG/DL (ref 0–1.2)
BUN SERPL-MCNC: 17 MG/DL (ref 6–20)
BUN/CREAT SERPL: 18.7 (ref 7–25)
CALCIUM SERPL-MCNC: 9.1 MG/DL (ref 8.6–10.5)
CHLORIDE SERPL-SCNC: 99 MMOL/L (ref 98–107)
CHOLEST SERPL-MCNC: 356 MG/DL (ref 0–200)
CO2 SERPL-SCNC: 25.3 MMOL/L (ref 22–29)
CREAT SERPL-MCNC: 0.91 MG/DL (ref 0.76–1.27)
FOLATE SERPL-MCNC: 9.68 NG/ML (ref 4.78–24.2)
GLOBULIN SER CALC-MCNC: 2.3 GM/DL
GLUCOSE SERPL-MCNC: 103 MG/DL (ref 65–99)
HDLC SERPL-MCNC: ABNORMAL MG/DL
LDLC SERPL CALC-MCNC: ABNORMAL MG/DL
POTASSIUM SERPL-SCNC: 4.6 MMOL/L (ref 3.5–5.2)
PROT SERPL-MCNC: 6.5 G/DL (ref 6–8.5)
SODIUM SERPL-SCNC: 134 MMOL/L (ref 136–145)
TRIGL SERPL-MCNC: 1440 MG/DL (ref 0–150)
TSH SERPL DL<=0.005 MIU/L-ACNC: 2.52 UIU/ML (ref 0.27–4.2)
VIT B12 SERPL-MCNC: 414 PG/ML (ref 211–946)
VLDLC SERPL CALC-MCNC: ABNORMAL MG/DL

## 2020-08-24 RX ORDER — ERGOCALCIFEROL 1.25 MG/1
50000 CAPSULE ORAL WEEKLY
Qty: 5 CAPSULE | Refills: 5 | Status: SHIPPED | OUTPATIENT
Start: 2020-08-24 | End: 2021-03-17

## 2020-08-24 RX ORDER — FENOFIBRATE 160 MG/1
160 TABLET ORAL DAILY
Qty: 30 TABLET | Refills: 3 | Status: SHIPPED | OUTPATIENT
Start: 2020-08-24 | End: 2020-11-17 | Stop reason: SDUPTHER

## 2020-08-25 ENCOUNTER — OFFICE VISIT (OUTPATIENT)
Dept: FAMILY MEDICINE CLINIC | Facility: CLINIC | Age: 45
End: 2020-08-25

## 2020-08-25 VITALS
HEIGHT: 65 IN | WEIGHT: 158 LBS | DIASTOLIC BLOOD PRESSURE: 78 MMHG | SYSTOLIC BLOOD PRESSURE: 124 MMHG | TEMPERATURE: 97.7 F | OXYGEN SATURATION: 97 % | BODY MASS INDEX: 26.33 KG/M2 | HEART RATE: 89 BPM

## 2020-08-25 DIAGNOSIS — E55.9 VITAMIN D DEFICIENCY: ICD-10-CM

## 2020-08-25 DIAGNOSIS — E78.1 HYPERTRIGLYCERIDEMIA: Primary | ICD-10-CM

## 2020-08-25 PROCEDURE — 99213 OFFICE O/P EST LOW 20 MIN: CPT | Performed by: FAMILY MEDICINE

## 2020-08-25 NOTE — PROGRESS NOTES
Chief Complaint   Patient presents with   • Lab     Pt is here to go over recent lab results.        Subjective   Smith Chisholm is a 45 y.o. male.     History of Present Illness   Pt is here for refills and to go over labs.  His triglycerides are very high and needs to know what to do.  He has average diet and doesn't exercise regularly.  Vit d deficiency- needs to start meds for this.  The following portions of the patient's history were reviewed and updated as appropriate: allergies, current medications, past family history, past medical history, past social history, past surgical history and problem list.    Review of Systems   Constitutional: Negative for fatigue.   Eyes: Negative for blurred vision.   Respiratory: Negative for cough, chest tightness and shortness of breath.    Cardiovascular: Negative for chest pain, palpitations and leg swelling.   Neurological: Negative for dizziness, light-headedness and headache.       Patient Active Problem List   Diagnosis   • Nephrolithiasis   • Hypertriglyceridemia   • Eczema   • Acute seasonal allergic rhinitis due to pollen   • Bilateral chronic serous otitis media   • Generalized abdominal pain   • Primary insomnia   • Migraine without aura and without status migrainosus, not intractable   • Chronic gastric ulcer without hemorrhage and without perforation   • Vitamin D deficiency       No Known Allergies      Current Outpatient Medications:   •  aspirin-acetaminophen-caffeine (EXCEDRIN MIGRAINE) 250-250-65 MG per tablet, Take 1 tablet by mouth Daily. Take one tablet daily as needed., Disp: 60 tablet, Rfl: 3  •  aspirin-acetaminophen-caffeine (Excedrin Migraine) 250-250-65 MG per tablet, Take 1 tablet by mouth Every 8 (Eight) Hours As Needed for Headache., Disp: 60 tablet, Rfl: 4  •  dicyclomine (Bentyl) 20 MG tablet, Take 1 tablet by mouth Every 6 (Six) Hours., Disp: 30 tablet, Rfl: 1  •  fenofibrate 160 MG tablet, Take 1 tablet by mouth Daily., Disp: 30 tablet,  "Rfl: 3  •  hyoscyamine (LEVSIN) 0.125 MG SL tablet, Take 1 tablet by mouth Every 4 (Four) Hours As Needed for Cramping., Disp: 60 tablet, Rfl: 5  •  pantoprazole (PROTONIX) 40 MG EC tablet, Take 1 tablet by mouth Daily., Disp: 30 tablet, Rfl: 12  •  sucralfate (Carafate) 1 g tablet, Take 1 tablet by mouth 2 (two) times a day., Disp: 60 tablet, Rfl: 0  •  vitamin D (ERGOCALCIFEROL) 1.25 MG (84363 UT) capsule capsule, Take 1 capsule by mouth 1 (One) Time Per Week., Disp: 5 capsule, Rfl: 5    Past Medical History:   Diagnosis Date   • Abdominal pain    • Acute seasonal allergic rhinitis due to pollen    • Eczema    • Hypertriglyceridemia    • 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    • Nephrolithiasis    • Paresthesia of left leg        Past Surgical History:   Procedure Laterality Date   • APPENDECTOMY         Family History   Problem Relation Age of Onset   • No Known Problems Mother        Social History     Tobacco Use   • Smoking status: Never Smoker   • Smokeless tobacco: Never Used   Substance Use Topics   • Alcohol use: No     Frequency: Never            Objective     Visit Vitals  /78   Pulse 89   Temp 97.7 °F (36.5 °C)   Ht 165.1 cm (65\")   Wt 71.7 kg (158 lb)   SpO2 97%   BMI 26.29 kg/m²       Physical Exam   Cardiovascular: Normal rate, regular rhythm and normal heart sounds.   Pulmonary/Chest: Effort normal and breath sounds normal. No stridor. No respiratory distress. He has no rales.   Nursing note and vitals reviewed.      Lab Results   Component Value Date    BUN 17 08/21/2020    CREATININE 0.91 08/21/2020    EGFRIFNONA 91 08/21/2020    EGFRIFAFRI 110 08/21/2020    BCR 18.7 08/21/2020    K 4.6 08/21/2020    CO2 25.3 08/21/2020    CALCIUM 9.1 08/21/2020    PROTENTOTREF 6.5 08/21/2020    ALBUMIN 4.20 08/21/2020    LABIL2 1.8 08/21/2020    AST 20 08/21/2020    ALT 23 08/21/2020       WBC   Date Value Ref Range Status   06/04/2020 8.66 4.5 - 11.0 10*3/uL Final     RBC   Date Value Ref Range Status   06/04/2020 4.93 " 4.5 - 5.9 10*6/uL Final     Hemoglobin   Date Value Ref Range Status   06/04/2020 14.3 13.5 - 17.5 g/dL Final     Hematocrit   Date Value Ref Range Status   06/04/2020 42.5 41.0 - 53.0 % Final     MCV   Date Value Ref Range Status   06/04/2020 86.2 80.0 - 100.0 fL Final     MCH   Date Value Ref Range Status   06/04/2020 29.0 26.0 - 34.0 pg Final     MCHC   Date Value Ref Range Status   06/04/2020 33.6 31.0 - 37.0 g/dL Final     RDW   Date Value Ref Range Status   06/04/2020 14.0 12.0 - 16.8 % Final     MPV   Date Value Ref Range Status   06/04/2020 11.3 (H) 6.7 - 10.8 fL Final     Platelets   Date Value Ref Range Status   06/04/2020 238 140 - 440 10*3/uL Final     Neutrophil Rel %   Date Value Ref Range Status   06/04/2020 46.0 45 - 80 % Final     Lymphocyte Rel %   Date Value Ref Range Status   06/04/2020 48.0 15 - 50 % Final     Monocyte Rel %   Date Value Ref Range Status   06/04/2020 5.0 0 - 15 % Final     Eosinophil %   Date Value Ref Range Status   06/04/2020 1.0 0 - 7 % Final     Basophil Rel %   Date Value Ref Range Status   02/26/2020 0.1 0 - 2 % Final     Immature Grans %   Date Value Ref Range Status   02/26/2020 0.3 (H) 0 % Final     Neutrophils Absolute   Date Value Ref Range Status   06/04/2020 3.98 1.5 - 7.5 /uL Final   06/04/2020 3.98 2.0 - 8.8 10*3/uL Final     Lymphocytes Absolute   Date Value Ref Range Status   06/04/2020 4.16 0.7 - 5.5 10*3/uL Final     Monocytes Absolute   Date Value Ref Range Status   06/04/2020 0.43 0.0 - 1.7 10*3/uL Final     Eosinophils Absolute   Date Value Ref Range Status   06/04/2020 0.09 0.0 - 0.8 10*3/uL Final     Basophils Absolute   Date Value Ref Range Status   02/26/2020 0.01 0.0 - 0.2 10*3/uL Final     Immature Grans, Absolute   Date Value Ref Range Status   02/26/2020 0.02 <1 10*3/uL Final     nRBC   Date Value Ref Range Status   06/04/2020 0 0 /100(WBC) Final       No results found for: HGBA1C    Lab Results   Component Value Date    NLACOMWT27 414 08/21/2020        TSH   Date Value Ref Range Status   08/21/2020 2.520 0.270 - 4.200 uIU/mL Final       No results found for: CHOL  Lab Results   Component Value Date    TRIG 1,440 (H) 08/21/2020     Lab Results   Component Value Date    HDL CANCELED 08/21/2020     Lab Results   Component Value Date    LDL CANCELED 08/21/2020     Lab Results   Component Value Date    VLDL CANCELED 08/21/2020     No results found for: LDLHDL      Procedures    Assessment/Plan   Problems Addressed this Visit     None          No orders of the defined types were placed in this encounter.      Current Outpatient Medications   Medication Sig Dispense Refill   • aspirin-acetaminophen-caffeine (EXCEDRIN MIGRAINE) 250-250-65 MG per tablet Take 1 tablet by mouth Daily. Take one tablet daily as needed. 60 tablet 3   • aspirin-acetaminophen-caffeine (Excedrin Migraine) 250-250-65 MG per tablet Take 1 tablet by mouth Every 8 (Eight) Hours As Needed for Headache. 60 tablet 4   • dicyclomine (Bentyl) 20 MG tablet Take 1 tablet by mouth Every 6 (Six) Hours. 30 tablet 1   • fenofibrate 160 MG tablet Take 1 tablet by mouth Daily. 30 tablet 3   • hyoscyamine (LEVSIN) 0.125 MG SL tablet Take 1 tablet by mouth Every 4 (Four) Hours As Needed for Cramping. 60 tablet 5   • pantoprazole (PROTONIX) 40 MG EC tablet Take 1 tablet by mouth Daily. 30 tablet 12   • sucralfate (Carafate) 1 g tablet Take 1 tablet by mouth 2 (two) times a day. 60 tablet 0   • vitamin D (ERGOCALCIFEROL) 1.25 MG (91802 UT) capsule capsule Take 1 capsule by mouth 1 (One) Time Per Week. 5 capsule 5     No current facility-administered medications for this visit.    I went over his labs with him.  Will start the fenofibrate. And vit d weekly  Return in about 3 months (around 11/25/2020) for hyperlipidema.    Patient Instructions   Work on diet and exercise.

## 2020-08-31 ENCOUNTER — TELEPHONE (OUTPATIENT)
Dept: GASTROENTEROLOGY | Facility: CLINIC | Age: 45
End: 2020-08-31

## 2020-08-31 ENCOUNTER — PRIOR AUTHORIZATION (OUTPATIENT)
Dept: GASTROENTEROLOGY | Facility: CLINIC | Age: 45
End: 2020-08-31

## 2020-08-31 NOTE — TELEPHONE ENCOUNTER
Called pt back. He states he tried refilling the pantoprazole, but [facility] told him that he could not fill it. Advised will call [facility] and find out what the hold up is. He should have a year's worth of refills. Pt verb understanding.      Called [facility] and spoke with Demar. He states med needs PA for use greater than 90 days. He will fax PA request now.

## 2020-08-31 NOTE — TELEPHONE ENCOUNTER
----- Message from Giorgi Bowers sent at 8/31/2020 10:15 AM EDT -----  Regarding: call back   Contact: [phone]  Pt stated office keep calling him and have questions about medication.

## 2020-09-14 RX ORDER — SUCRALFATE 1 G/1
TABLET ORAL
Qty: 60 TABLET | Refills: 0 | OUTPATIENT
Start: 2020-09-14

## 2020-09-28 RX ORDER — SUCRALFATE 1 G/1
TABLET ORAL
Qty: 60 TABLET | Refills: 0 | OUTPATIENT
Start: 2020-09-28

## 2020-09-28 RX ORDER — SUCRALFATE 1 G/1
TABLET ORAL
Qty: 120 TABLET | Refills: 6 | Status: SHIPPED | OUTPATIENT
Start: 2020-09-28 | End: 2021-01-18 | Stop reason: SDUPTHER

## 2020-10-09 PROBLEM — R10.30 LOWER ABDOMINAL PAIN: Status: ACTIVE | Noted: 2020-10-09

## 2020-10-23 ENCOUNTER — TELEPHONE (OUTPATIENT)
Dept: GASTROENTEROLOGY | Facility: CLINIC | Age: 45
End: 2020-10-23

## 2020-10-23 NOTE — TELEPHONE ENCOUNTER
"Returned patient's phone call. He states his Pantoprazole is not helping nor sucralfate.   Patient states, \"he feels as if he has a foreign object\" in his stomach.   He states he need immediate attention. Advised patient if has severe pain, he will need to go to the ER for an evaluation.   He states his medication is not working and needs something for his issues.   Advised will send an update to MADAI Mitchell. He states he wants an answer immediately.  Again advised patient to go to the ER if his pain is not controlled.   Advised an update will be sent to Paula with a high priority but she is in clinic and will given an answer once she has reviewed his chart.   "

## 2020-10-23 NOTE — TELEPHONE ENCOUNTER
----- Message from Brenton Van Rep sent at 10/23/2020  9:55 AM EDT -----  Regarding: med  Contact: [phone]  Pt states med he is on is not working and needs something stronger.

## 2020-11-05 ENCOUNTER — TELEMEDICINE (OUTPATIENT)
Dept: FAMILY MEDICINE CLINIC | Facility: CLINIC | Age: 45
End: 2020-11-05

## 2020-11-05 DIAGNOSIS — J02.9 ST (SORE THROAT): Primary | ICD-10-CM

## 2020-11-05 PROCEDURE — 99213 OFFICE O/P EST LOW 20 MIN: CPT | Performed by: FAMILY MEDICINE

## 2020-11-05 RX ORDER — AMOXICILLIN 875 MG/1
875 TABLET, COATED ORAL 2 TIMES DAILY
Qty: 20 TABLET | Refills: 0 | Status: SHIPPED | OUTPATIENT
Start: 2020-11-05 | End: 2021-03-12

## 2020-11-05 NOTE — PROGRESS NOTES
CC: sore throat.      Dony Chisholm is a 45 y.o. male.     History of Present Illness   The patient presents today for follow-up via a video visit due to the COVID-19 pandemic for  Throat pain for past day on right side.  When he eats and swallows there is some pain.  No fever or chills.  No SOB or cough.  No body aches. He has had a recent covid test that was negative.            The following portions of the patient's history were reviewed and updated as appropriate: allergies, current medications, past family history, past medical history, past social history, past surgical history and problem list.    Review of Systems see above    Patient Active Problem List   Diagnosis   • Nephrolithiasis   • Hypertriglyceridemia   • Eczema   • Acute seasonal allergic rhinitis due to pollen   • Bilateral chronic serous otitis media   • Generalized abdominal pain   • Primary insomnia   • Migraine without aura and without status migrainosus, not intractable   • Chronic gastric ulcer without hemorrhage and without perforation   • Vitamin D deficiency   • Lower abdominal pain   • ST (sore throat)       No Known Allergies      Current Outpatient Medications:   •  amoxicillin (AMOXIL) 875 MG tablet, Take 1 tablet by mouth 2 (Two) Times a Day., Disp: 20 tablet, Rfl: 0  •  aspirin-acetaminophen-caffeine (EXCEDRIN MIGRAINE) 250-250-65 MG per tablet, Take 1 tablet by mouth Daily. Take one tablet daily as needed., Disp: 60 tablet, Rfl: 3  •  aspirin-acetaminophen-caffeine (Excedrin Migraine) 250-250-65 MG per tablet, Take 1 tablet by mouth Every 8 (Eight) Hours As Needed for Headache., Disp: 60 tablet, Rfl: 4  •  dicyclomine (Bentyl) 20 MG tablet, Take 1 tablet by mouth Every 6 (Six) Hours., Disp: 30 tablet, Rfl: 1  •  fenofibrate 160 MG tablet, Take 1 tablet by mouth Daily., Disp: 30 tablet, Rfl: 3  •  hyoscyamine (LEVSIN) 0.125 MG SL tablet, Take 1 tablet by mouth Every 4 (Four) Hours As Needed for Cramping., Disp: 60  tablet, Rfl: 5  •  pantoprazole (PROTONIX) 40 MG EC tablet, Take 1 tablet by mouth Daily., Disp: 30 tablet, Rfl: 12  •  sucralfate (Carafate) 1 g tablet, Take 1 tablet by mouth 2 (two) times a day., Disp: 60 tablet, Rfl: 0  •  sucralfate (Carafate) 1 g tablet, Dissolve 1 tablet in 1 tablespoon warm water until it becomes a slurry, then drink. Do this four times per day, before meals and at bedtime., Disp: 120 tablet, Rfl: 6  •  vitamin D (ERGOCALCIFEROL) 1.25 MG (60592 UT) capsule capsule, Take 1 capsule by mouth 1 (One) Time Per Week., Disp: 5 capsule, Rfl: 5    Past Medical History:   Diagnosis Date   • Abdominal pain    • Acute seasonal allergic rhinitis due to pollen    • Eczema    • Hypertriglyceridemia    • 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    • Nephrolithiasis    • Paresthesia of left leg        Past Surgical History:   Procedure Laterality Date   • APPENDECTOMY         Family History   Problem Relation Age of Onset   • No Known Problems Mother        Social History     Tobacco Use   • Smoking status: Never Smoker   • Smokeless tobacco: Never Used   Substance Use Topics   • Alcohol use: No     Frequency: Never            Objective     There were no vitals taken for this visit. Video Visit    Physical Exam  NAD  AAOx3  No labored breathing.  EOMI   PERRLA       Lab Results   Component Value Date    BUN 17 08/21/2020    CREATININE 0.91 08/21/2020    EGFRIFNONA 91 08/21/2020    EGFRIFAFRI 110 08/21/2020    BCR 18.7 08/21/2020    K 4.6 08/21/2020    CO2 25.3 08/21/2020    CALCIUM 9.1 08/21/2020    PROTENTOTREF 6.5 08/21/2020    ALBUMIN 4.20 08/21/2020    LABIL2 1.8 08/21/2020    AST 20 08/21/2020    ALT 23 08/21/2020       WBC   Date Value Ref Range Status   06/04/2020 8.66 4.5 - 11.0 10*3/uL Final     RBC   Date Value Ref Range Status   06/04/2020 4.93 4.5 - 5.9 10*6/uL Final     Hemoglobin   Date Value Ref Range Status   06/04/2020 14.3 13.5 - 17.5 g/dL Final     Hematocrit   Date Value Ref Range Status   06/04/2020 42.5  41.0 - 53.0 % Final     MCV   Date Value Ref Range Status   06/04/2020 86.2 80.0 - 100.0 fL Final     MCH   Date Value Ref Range Status   06/04/2020 29.0 26.0 - 34.0 pg Final     MCHC   Date Value Ref Range Status   06/04/2020 33.6 31.0 - 37.0 g/dL Final     RDW   Date Value Ref Range Status   06/04/2020 14.0 12.0 - 16.8 % Final     MPV   Date Value Ref Range Status   06/04/2020 11.3 (H) 6.7 - 10.8 fL Final     Platelets   Date Value Ref Range Status   06/04/2020 238 140 - 440 10*3/uL Final     Neutrophil Rel %   Date Value Ref Range Status   06/04/2020 46.0 45 - 80 % Final     Lymphocyte Rel %   Date Value Ref Range Status   06/04/2020 48.0 15 - 50 % Final     Monocyte Rel %   Date Value Ref Range Status   06/04/2020 5.0 0 - 15 % Final     Eosinophil %   Date Value Ref Range Status   06/04/2020 1.0 0 - 7 % Final     Basophil Rel %   Date Value Ref Range Status   02/26/2020 0.1 0 - 2 % Final     Immature Grans %   Date Value Ref Range Status   02/26/2020 0.3 (H) 0 % Final     Neutrophils Absolute   Date Value Ref Range Status   06/04/2020 3.98 1.5 - 7.5 /uL Final   06/04/2020 3.98 2.0 - 8.8 10*3/uL Final     Lymphocytes Absolute   Date Value Ref Range Status   06/04/2020 4.16 0.7 - 5.5 10*3/uL Final     Monocytes Absolute   Date Value Ref Range Status   06/04/2020 0.43 0.0 - 1.7 10*3/uL Final     Eosinophils Absolute   Date Value Ref Range Status   06/04/2020 0.09 0.0 - 0.8 10*3/uL Final     Basophils Absolute   Date Value Ref Range Status   02/26/2020 0.01 0.0 - 0.2 10*3/uL Final     Immature Grans, Absolute   Date Value Ref Range Status   02/26/2020 0.02 <1 10*3/uL Final     nRBC   Date Value Ref Range Status   06/04/2020 0 0 /100(WBC) Final       No results found for: HGBA1C    Lab Results   Component Value Date    ISDODJFZ23 414 08/21/2020       TSH   Date Value Ref Range Status   08/21/2020 2.520 0.270 - 4.200 uIU/mL Final       No results found for: CHOL  Lab Results   Component Value Date    TRIG 1,440 (H)  08/21/2020     Lab Results   Component Value Date    HDL CANCELED 08/21/2020     Lab Results   Component Value Date    LDL CANCELED 08/21/2020     Lab Results   Component Value Date    VLDL CANCELED 08/21/2020     No results found for: LDLHDL      Procedures    Assessment/Plan   Problems Addressed this Visit        Respiratory    ST (sore throat) - Primary    Relevant Medications    amoxicillin (AMOXIL) 875 MG tablet      Diagnoses       Codes Comments    ST (sore throat)    -  Primary ICD-10-CM: J02.9  ICD-9-CM: 462           No orders of the defined types were placed in this encounter.      Current Outpatient Medications   Medication Sig Dispense Refill   • amoxicillin (AMOXIL) 875 MG tablet Take 1 tablet by mouth 2 (Two) Times a Day. 20 tablet 0   • aspirin-acetaminophen-caffeine (EXCEDRIN MIGRAINE) 250-250-65 MG per tablet Take 1 tablet by mouth Daily. Take one tablet daily as needed. 60 tablet 3   • aspirin-acetaminophen-caffeine (Excedrin Migraine) 250-250-65 MG per tablet Take 1 tablet by mouth Every 8 (Eight) Hours As Needed for Headache. 60 tablet 4   • dicyclomine (Bentyl) 20 MG tablet Take 1 tablet by mouth Every 6 (Six) Hours. 30 tablet 1   • fenofibrate 160 MG tablet Take 1 tablet by mouth Daily. 30 tablet 3   • hyoscyamine (LEVSIN) 0.125 MG SL tablet Take 1 tablet by mouth Every 4 (Four) Hours As Needed for Cramping. 60 tablet 5   • pantoprazole (PROTONIX) 40 MG EC tablet Take 1 tablet by mouth Daily. 30 tablet 12   • sucralfate (Carafate) 1 g tablet Take 1 tablet by mouth 2 (two) times a day. 60 tablet 0   • sucralfate (Carafate) 1 g tablet Dissolve 1 tablet in 1 tablespoon warm water until it becomes a slurry, then drink. Do this four times per day, before meals and at bedtime. 120 tablet 6   • vitamin D (ERGOCALCIFEROL) 1.25 MG (74044 UT) capsule capsule Take 1 capsule by mouth 1 (One) Time Per Week. 5 capsule 5     No current facility-administered medications for this visit.        No follow-ups on  file.    There are no Patient Instructions on file for this visit.       Symptomatic treatment and otc meds and fluids and rest.  Follow up if no better.   Start abx and nsaids.  Time spent on visit:  11   minutes.  This patient has consented to a telehealth visit via video. The visit was scheduled as a video visit to comply with patient safety concerns in accordance with CDC recommendations.  All vitals recorded within this visit are reported by the patient.

## 2020-11-12 ENCOUNTER — TELEPHONE (OUTPATIENT)
Dept: FAMILY MEDICINE CLINIC | Facility: CLINIC | Age: 45
End: 2020-11-12

## 2020-11-12 DIAGNOSIS — E55.9 VITAMIN D DEFICIENCY: ICD-10-CM

## 2020-11-12 DIAGNOSIS — E78.1 HYPERTRIGLYCERIDEMIA: Primary | ICD-10-CM

## 2020-11-12 NOTE — TELEPHONE ENCOUNTER
Patient is calling to schedule labs.  He will need an order to be placed in chart.    Please advise.    Patient call back [phone].

## 2020-11-14 DIAGNOSIS — E78.2 HYPERLIPEMIA, MIXED: ICD-10-CM

## 2020-11-14 DIAGNOSIS — E78.1 HYPERTRIGLYCERIDEMIA: Primary | ICD-10-CM

## 2020-11-14 LAB
25(OH)D3+25(OH)D2 SERPL-MCNC: 25.5 NG/ML (ref 30–100)
ALBUMIN SERPL-MCNC: 4.2 G/DL (ref 3.5–5.2)
ALBUMIN/GLOB SERPL: 1.5 G/DL
ALP SERPL-CCNC: 83 U/L (ref 39–117)
ALT SERPL-CCNC: 19 U/L (ref 1–41)
AST SERPL-CCNC: 15 U/L (ref 1–40)
BILIRUB SERPL-MCNC: 0.3 MG/DL (ref 0–1.2)
BUN SERPL-MCNC: 19 MG/DL (ref 6–20)
BUN/CREAT SERPL: 18.4 (ref 7–25)
CALCIUM SERPL-MCNC: 9.8 MG/DL (ref 8.6–10.5)
CHLORIDE SERPL-SCNC: 99 MMOL/L (ref 98–107)
CHOLEST SERPL-MCNC: 274 MG/DL (ref 0–200)
CO2 SERPL-SCNC: 27 MMOL/L (ref 22–29)
CREAT SERPL-MCNC: 1.03 MG/DL (ref 0.76–1.27)
GLOBULIN SER CALC-MCNC: 2.8 GM/DL
GLUCOSE SERPL-MCNC: 98 MG/DL (ref 65–99)
HDLC SERPL-MCNC: 35 MG/DL (ref 40–60)
LDLC SERPL CALC-MCNC: 103 MG/DL (ref 0–100)
POTASSIUM SERPL-SCNC: 4.2 MMOL/L (ref 3.5–5.2)
PROT SERPL-MCNC: 7 G/DL (ref 6–8.5)
SODIUM SERPL-SCNC: 135 MMOL/L (ref 136–145)
TRIGL SERPL-MCNC: 777 MG/DL (ref 0–150)
VLDLC SERPL CALC-MCNC: 136 MG/DL (ref 5–40)

## 2020-11-14 RX ORDER — ATORVASTATIN CALCIUM 20 MG/1
20 TABLET, FILM COATED ORAL DAILY
Qty: 30 TABLET | Refills: 3 | Status: SHIPPED | OUTPATIENT
Start: 2020-11-14 | End: 2021-03-12

## 2020-11-17 DIAGNOSIS — E78.1 HYPERTRIGLYCERIDEMIA: ICD-10-CM

## 2020-11-17 RX ORDER — FENOFIBRATE 160 MG/1
160 TABLET ORAL DAILY
Qty: 30 TABLET | Refills: 3 | Status: SHIPPED | OUTPATIENT
Start: 2020-11-17 | End: 2022-09-17

## 2020-11-17 RX ORDER — PANTOPRAZOLE SODIUM 40 MG/1
40 TABLET, DELAYED RELEASE ORAL DAILY
Qty: 30 TABLET | Refills: 12 | Status: SHIPPED | OUTPATIENT
Start: 2020-11-17 | End: 2020-11-19

## 2020-11-17 NOTE — TELEPHONE ENCOUNTER
Caller: Smith Chisholm    Relationship: Self    Best call back number: [phone]     Medication needed:   Requested Prescriptions     Pending Prescriptions Disp Refills   • pantoprazole (PROTONIX) 40 MG EC tablet 30 tablet 12     Sig: Take 1 tablet by mouth Daily.       When do you need the refill by: 11/17/2020    What details did the patient provide when requesting the medication: IS TOTALLY OUT OF THIS MEDICATION HE NEEDS SCRIPT WRITTEN       Does the patient have less than a 3 day supply:  [x] Yes  [] No    What is the patient's preferred pharmacy:    [facility] #[id number] [place], [place] - [address] AT [facility] [phone] [facility] [phone] FX

## 2020-11-17 NOTE — TELEPHONE ENCOUNTER
PATIENT STATES: that he would like to know his lab results please advise     PATIENT CAN BE REACHED ON:[phone]

## 2020-11-18 ENCOUNTER — TELEPHONE (OUTPATIENT)
Dept: FAMILY MEDICINE CLINIC | Facility: CLINIC | Age: 45
End: 2020-11-18

## 2020-11-18 NOTE — TELEPHONE ENCOUNTER
PATIENT SAYS WAS ADVISED TO TAKE TWO PANTOPRAZOLE PER DAY IF ONE DIDN'T WORK AND HE IS RUNNING OUT. PHARMACY TOLD HIM HE IS TO ONLY TAKE ONE DAILY     PLEASE ADVISE PATIENT OF CORRECT DOSAGE AND ADJUST PRESCRIPTION DOSAGE IF NEEDED    Pharmacy:  [facility] #[id number] - [place], [place] - [address] AT [facility] - [phone] [facility] [phone]     [phone]

## 2020-11-18 NOTE — TELEPHONE ENCOUNTER
PATIENT CALLED REQUESTING MYCHART APPOINTMENT FOR SORE THROAT AND FLAY SCALP    -NO AVAILABILITY UNTIL 11/24 FOR ANY KIND OF APPOINTMENT WITH DR WHITAKER, PATIENT DECLINED APRN    HE WOULD LIKE TO KNOW IF HE CAN BE FIT IN FOR VIDEO OR TELEPHONE VISIT WITH DR WHITAKER    PLEASE ADVISE    [phone]

## 2020-11-19 ENCOUNTER — TELEMEDICINE (OUTPATIENT)
Dept: FAMILY MEDICINE CLINIC | Facility: CLINIC | Age: 45
End: 2020-11-19

## 2020-11-19 DIAGNOSIS — L30.9 ECZEMA, UNSPECIFIED TYPE: Primary | ICD-10-CM

## 2020-11-19 DIAGNOSIS — K21.9 GERD WITHOUT ESOPHAGITIS: ICD-10-CM

## 2020-11-19 DIAGNOSIS — J02.9 ST (SORE THROAT): ICD-10-CM

## 2020-11-19 PROCEDURE — 99422 OL DIG E/M SVC 11-20 MIN: CPT | Performed by: FAMILY MEDICINE

## 2020-11-19 RX ORDER — PANTOPRAZOLE SODIUM 40 MG/1
40 TABLET, DELAYED RELEASE ORAL 2 TIMES DAILY
Qty: 60 TABLET | Refills: 5 | Status: SHIPPED | OUTPATIENT
Start: 2020-11-19 | End: 2020-12-07 | Stop reason: SDUPTHER

## 2020-11-19 RX ORDER — DIAPER,BRIEF,INFANT-TODD,DISP
EACH MISCELLANEOUS 2 TIMES DAILY
Qty: 28 G | Refills: 1 | Status: SHIPPED | OUTPATIENT
Start: 2020-11-19 | End: 2022-09-17

## 2020-11-19 RX ORDER — CETIRIZINE HYDROCHLORIDE 10 MG/1
10 TABLET ORAL DAILY
Qty: 30 TABLET | Refills: 5 | Status: SHIPPED | OUTPATIENT
Start: 2020-11-19 | End: 2021-06-08 | Stop reason: SDUPTHER

## 2020-11-19 NOTE — PROGRESS NOTES
CC Sore throat    Subjective   Smith Chisholm is a 45 y.o. male.     History of Present Illness   The patient presents today for follow-up via a video visit due to the COVID-19 pandemic for  He has pharyngitis and has been taking abx and still has left side throat pain.  He has some drainage as well.  No fever or chills    He has gerd- and needs it at bid so will send it in.    He has dry itchy spot on scalp for past few weeks and wants a cream for this.  Hx of eczema    He was recently negative for Covid            The following portions of the patient's history were reviewed and updated as appropriate: allergies, current medications, past family history, past medical history, past social history, past surgical history and problem list.    Review of Systems   Constitutional: Negative for fatigue.   Eyes: Negative for blurred vision.   Respiratory: Negative for cough, chest tightness and shortness of breath.    Cardiovascular: Negative for chest pain, palpitations and leg swelling.   Neurological: Negative for dizziness, light-headedness and headache.        Patient Active Problem List   Diagnosis   • Nephrolithiasis   • Hypertriglyceridemia   • Eczema   • Acute seasonal allergic rhinitis due to pollen   • Bilateral chronic serous otitis media   • Generalized abdominal pain   • Primary insomnia   • Migraine without aura and without status migrainosus, not intractable   • Chronic gastric ulcer without hemorrhage and without perforation   • Vitamin D deficiency   • Lower abdominal pain   • ST (sore throat)   • GERD without esophagitis       No Known Allergies      Current Outpatient Medications:   •  pantoprazole (PROTONIX) 40 MG EC tablet, Take 1 tablet by mouth Daily., Disp: 30 tablet, Rfl: 12  •  amoxicillin (AMOXIL) 875 MG tablet, Take 1 tablet by mouth 2 (Two) Times a Day., Disp: 20 tablet, Rfl: 0  •  aspirin-acetaminophen-caffeine (EXCEDRIN MIGRAINE) 250-250-65 MG per tablet, Take 1 tablet by mouth Daily.  Take one tablet daily as needed., Disp: 60 tablet, Rfl: 3  •  aspirin-acetaminophen-caffeine (Excedrin Migraine) 250-250-65 MG per tablet, Take 1 tablet by mouth Every 8 (Eight) Hours As Needed for Headache., Disp: 60 tablet, Rfl: 4  •  atorvastatin (LIPITOR) 20 MG tablet, Take 1 tablet by mouth Daily., Disp: 30 tablet, Rfl: 3  •  dicyclomine (Bentyl) 20 MG tablet, Take 1 tablet by mouth Every 6 (Six) Hours., Disp: 30 tablet, Rfl: 1  •  fenofibrate 160 MG tablet, Take 1 tablet by mouth Daily., Disp: 30 tablet, Rfl: 3  •  hyoscyamine (LEVSIN) 0.125 MG SL tablet, Take 1 tablet by mouth Every 4 (Four) Hours As Needed for Cramping., Disp: 60 tablet, Rfl: 5  •  sucralfate (Carafate) 1 g tablet, Take 1 tablet by mouth 2 (two) times a day., Disp: 60 tablet, Rfl: 0  •  sucralfate (Carafate) 1 g tablet, Dissolve 1 tablet in 1 tablespoon warm water until it becomes a slurry, then drink. Do this four times per day, before meals and at bedtime., Disp: 120 tablet, Rfl: 6  •  vitamin D (ERGOCALCIFEROL) 1.25 MG (96623 UT) capsule capsule, Take 1 capsule by mouth 1 (One) Time Per Week., Disp: 5 capsule, Rfl: 5    Past Medical History:   Diagnosis Date   • Abdominal pain    • Acute seasonal allergic rhinitis due to pollen    • Eczema    • Hypertriglyceridemia    • 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    • Nephrolithiasis    • Paresthesia of left leg        Past Surgical History:   Procedure Laterality Date   • APPENDECTOMY         Family History   Problem Relation Age of Onset   • No Known Problems Mother        Social History     Tobacco Use   • Smoking status: Never Smoker   • Smokeless tobacco: Never Used   Substance Use Topics   • Alcohol use: No     Frequency: Never            Objective     There were no vitals taken for this visit. Video Visit    Physical Exam  NAD  AAOx3  No labored breathing.  EOMI   PERRLA      Lab Results   Component Value Date    BUN 19 11/13/2020    CREATININE 1.03 11/13/2020    EGFRIFNONA 79 11/13/2020    EGFRIFAFRI  96 11/13/2020    BCR 18.4 11/13/2020    K 4.2 11/13/2020    CO2 27.0 11/13/2020    CALCIUM 9.8 11/13/2020    PROTENTOTREF 7.0 11/13/2020    ALBUMIN 4.20 11/13/2020    LABIL2 1.5 11/13/2020    AST 15 11/13/2020    ALT 19 11/13/2020       WBC   Date Value Ref Range Status   06/04/2020 8.66 4.5 - 11.0 10*3/uL Final     RBC   Date Value Ref Range Status   06/04/2020 4.93 4.5 - 5.9 10*6/uL Final     Hemoglobin   Date Value Ref Range Status   06/04/2020 14.3 13.5 - 17.5 g/dL Final     Hematocrit   Date Value Ref Range Status   06/04/2020 42.5 41.0 - 53.0 % Final     MCV   Date Value Ref Range Status   06/04/2020 86.2 80.0 - 100.0 fL Final     MCH   Date Value Ref Range Status   06/04/2020 29.0 26.0 - 34.0 pg Final     MCHC   Date Value Ref Range Status   06/04/2020 33.6 31.0 - 37.0 g/dL Final     RDW   Date Value Ref Range Status   06/04/2020 14.0 12.0 - 16.8 % Final     MPV   Date Value Ref Range Status   06/04/2020 11.3 (H) 6.7 - 10.8 fL Final     Platelets   Date Value Ref Range Status   06/04/2020 238 140 - 440 10*3/uL Final     Neutrophil Rel %   Date Value Ref Range Status   06/04/2020 46.0 45 - 80 % Final     Lymphocyte Rel %   Date Value Ref Range Status   06/04/2020 48.0 15 - 50 % Final     Monocyte Rel %   Date Value Ref Range Status   06/04/2020 5.0 0 - 15 % Final     Eosinophil %   Date Value Ref Range Status   06/04/2020 1.0 0 - 7 % Final     Basophil Rel %   Date Value Ref Range Status   02/26/2020 0.1 0 - 2 % Final     Immature Grans %   Date Value Ref Range Status   02/26/2020 0.3 (H) 0 % Final     Neutrophils Absolute   Date Value Ref Range Status   06/04/2020 3.98 1.5 - 7.5 /uL Final   06/04/2020 3.98 2.0 - 8.8 10*3/uL Final     Lymphocytes Absolute   Date Value Ref Range Status   06/04/2020 4.16 0.7 - 5.5 10*3/uL Final     Monocytes Absolute   Date Value Ref Range Status   06/04/2020 0.43 0.0 - 1.7 10*3/uL Final     Eosinophils Absolute   Date Value Ref Range Status   06/04/2020 0.09 0.0 - 0.8 10*3/uL  Final     Basophils Absolute   Date Value Ref Range Status   02/26/2020 0.01 0.0 - 0.2 10*3/uL Final     Immature Grans, Absolute   Date Value Ref Range Status   02/26/2020 0.02 <1 10*3/uL Final     nRBC   Date Value Ref Range Status   06/04/2020 0 0 /100(WBC) Final       No results found for: HGBA1C    Lab Results   Component Value Date    [id number] 414 08/21/2020       TSH   Date Value Ref Range Status   08/21/2020 2.520 0.270 - 4.200 uIU/mL Final       No results found for: CHOL  Lab Results   Component Value Date    TRIG 777 (H) 11/13/2020     Lab Results   Component Value Date    HDL 35 (L) 11/13/2020     Lab Results   Component Value Date     (H) 11/13/2020     Lab Results   Component Value Date    VLDL 136 (H) 11/13/2020     No results found for: LDLHDL      Procedures    Assessment/Plan   Problems Addressed this Visit        Respiratory    ST (sore throat)       Digestive    GERD without esophagitis       Musculoskeletal and Integument    Eczema - Primary      Diagnoses       Codes Comments    Eczema, unspecified type    -  Primary ICD-10-CM: L30.9  ICD-9-CM: 692.9     GERD without esophagitis     ICD-10-CM: K21.9  ICD-9-CM: 530.81     ST (sore throat)     ICD-10-CM: J02.9  ICD-9-CM: 462           No orders of the defined types were placed in this encounter.      Current Outpatient Medications   Medication Sig Dispense Refill   • pantoprazole (PROTONIX) 40 MG EC tablet Take 1 tablet by mouth Daily. 30 tablet 12   • amoxicillin (AMOXIL) 875 MG tablet Take 1 tablet by mouth 2 (Two) Times a Day. 20 tablet 0   • aspirin-acetaminophen-caffeine (EXCEDRIN MIGRAINE) 250-250-65 MG per tablet Take 1 tablet by mouth Daily. Take one tablet daily as needed. 60 tablet 3   • aspirin-acetaminophen-caffeine (Excedrin Migraine) 250-250-65 MG per tablet Take 1 tablet by mouth Every 8 (Eight) Hours As Needed for Headache. 60 tablet 4   • atorvastatin (LIPITOR) 20 MG tablet Take 1 tablet by mouth Daily. 30 tablet 3   •  dicyclomine (Bentyl) 20 MG tablet Take 1 tablet by mouth Every 6 (Six) Hours. 30 tablet 1   • fenofibrate 160 MG tablet Take 1 tablet by mouth Daily. 30 tablet 3   • hyoscyamine (LEVSIN) 0.125 MG SL tablet Take 1 tablet by mouth Every 4 (Four) Hours As Needed for Cramping. 60 tablet 5   • sucralfate (Carafate) 1 g tablet Take 1 tablet by mouth 2 (two) times a day. 60 tablet 0   • sucralfate (Carafate) 1 g tablet Dissolve 1 tablet in 1 tablespoon warm water until it becomes a slurry, then drink. Do this four times per day, before meals and at bedtime. 120 tablet 6   • vitamin D (ERGOCALCIFEROL) 1.25 MG (39348 UT) capsule capsule Take 1 capsule by mouth 1 (One) Time Per Week. 5 capsule 5     No current facility-administered medications for this visit.        No follow-ups on file.    There are no Patient Instructions on file for this visit.       Refills and sent in medication.  Start zyrtec- SE discussed.  If no better then to ENT.    Time spent on visit:  18   minutes.  This patient has consented to a telehealth visit via video. The visit was scheduled as a video visit to comply with patient safety concerns in accordance with CDC recommendations.  All vitals recorded within this visit are reported by the patient.

## 2020-11-23 DIAGNOSIS — K21.9 GERD WITHOUT ESOPHAGITIS: ICD-10-CM

## 2020-11-23 RX ORDER — PANTOPRAZOLE SODIUM 40 MG/1
40 TABLET, DELAYED RELEASE ORAL 2 TIMES DAILY
Qty: 60 TABLET | Refills: 5 | OUTPATIENT
Start: 2020-11-23

## 2020-11-23 NOTE — TELEPHONE ENCOUNTER
Caller: Smith Chisholm    Relationship: Self    Best call back number: [phone]     Medication needed:   Requested Prescriptions     Pending Prescriptions Disp Refills   • pantoprazole (PROTONIX) 40 MG EC tablet 60 tablet 5     Sig: Take 1 tablet by mouth 2 (two) times a day.       When do you need the refill by: SOON    What details did the patient provide when requesting the medication: PATIENT STATES THAT DR WHITAKER INSTRUCTED HIM TO TAKE 2 PILL PER DAY BUT THE PHARMACY ONLY GAVE 15 DAYS WORTH    Does the patient have less than a 3 day supply:  [] Yes  [x] No    What is the patient's [id number] [place], [place] - [address]  AT [facility] [phone] [facility] [phone]

## 2020-11-23 NOTE — TELEPHONE ENCOUNTER
Refill not necessary. Last refill on 11/19/2020 for qty 60 with 5 refills. Called pt and left message to contact the pharmacy

## 2020-11-25 ENCOUNTER — TELEPHONE (OUTPATIENT)
Dept: FAMILY MEDICINE CLINIC | Facility: CLINIC | Age: 45
End: 2020-11-25

## 2020-12-02 ENCOUNTER — TRANSCRIBE ORDERS (OUTPATIENT)
Dept: SLEEP MEDICINE | Facility: HOSPITAL | Age: 45
End: 2020-12-02

## 2020-12-02 DIAGNOSIS — Z01.818 OTHER SPECIFIED PRE-OPERATIVE EXAMINATION: Primary | ICD-10-CM

## 2020-12-07 DIAGNOSIS — K21.9 GERD WITHOUT ESOPHAGITIS: ICD-10-CM

## 2020-12-07 RX ORDER — PANTOPRAZOLE SODIUM 40 MG/1
40 TABLET, DELAYED RELEASE ORAL 2 TIMES DAILY
Qty: 60 TABLET | Refills: 5 | Status: SHIPPED | OUTPATIENT
Start: 2020-12-07 | End: 2021-10-27

## 2020-12-16 ENCOUNTER — LAB (OUTPATIENT)
Dept: LAB | Facility: HOSPITAL | Age: 45
End: 2020-12-16

## 2020-12-16 DIAGNOSIS — Z01.818 OTHER SPECIFIED PRE-OPERATIVE EXAMINATION: ICD-10-CM

## 2020-12-16 PROCEDURE — C9803 HOPD COVID-19 SPEC COLLECT: HCPCS

## 2020-12-16 PROCEDURE — U0004 COV-19 TEST NON-CDC HGH THRU: HCPCS

## 2020-12-17 LAB — SARS-COV-2 RNA RESP QL NAA+PROBE: DETECTED

## 2020-12-22 DIAGNOSIS — E78.1 HYPERTRIGLYCERIDEMIA: ICD-10-CM

## 2020-12-22 RX ORDER — FENOFIBRATE 160 MG/1
160 TABLET ORAL DAILY
Qty: 30 TABLET | Refills: 3 | OUTPATIENT
Start: 2020-12-22

## 2021-01-05 ENCOUNTER — OFFICE VISIT (OUTPATIENT)
Dept: FAMILY MEDICINE CLINIC | Facility: CLINIC | Age: 46
End: 2021-01-05

## 2021-01-05 ENCOUNTER — TRANSCRIBE ORDERS (OUTPATIENT)
Dept: LAB | Facility: HOSPITAL | Age: 46
End: 2021-01-05

## 2021-01-05 VITALS
HEIGHT: 65 IN | HEART RATE: 79 BPM | DIASTOLIC BLOOD PRESSURE: 82 MMHG | BODY MASS INDEX: 27.82 KG/M2 | WEIGHT: 167 LBS | SYSTOLIC BLOOD PRESSURE: 120 MMHG | OXYGEN SATURATION: 99 % | TEMPERATURE: 97.6 F

## 2021-01-05 DIAGNOSIS — R10.84 GENERALIZED ABDOMINAL PAIN: Primary | ICD-10-CM

## 2021-01-05 DIAGNOSIS — Z01.818 OTHER SPECIFIED PRE-OPERATIVE EXAMINATION: Primary | ICD-10-CM

## 2021-01-05 PROCEDURE — 99213 OFFICE O/P EST LOW 20 MIN: CPT | Performed by: FAMILY MEDICINE

## 2021-01-05 NOTE — PROGRESS NOTES
"Chief Complaint  Abdominal Pain (C/o abdominal burning pain on right side. Pt states pain moves from time to time from lower abdomen to upper abdomen. Pt has colonoscopy and endoscopy scheduled on 1/14/21. This was rescheduled due to having covid last month. )    Dony Chisholm presents to [facility] for   History of Present Illness  PT is here for abd pain on the right side.  He was supposed to get get cscope but was rescheduled to the 14th.  No NVCD.  He has had abd scans and all normal.  Recent visit to ER showed all was normal.  He states the pain is sometimes a poking pain.    Objective   Vital Signs:   /82   Pulse 79   Temp 97.6 °F (36.4 °C)   Ht 165.1 cm (65\")   Wt 75.8 kg (167 lb)   SpO2 99%   BMI 27.79 kg/m²     Physical Exam  Vitals signs and nursing note reviewed.   Cardiovascular:      Rate and Rhythm: Normal rate and regular rhythm.      Heart sounds: Normal heart sounds. No murmur. No friction rub.   Pulmonary:      Effort: Pulmonary effort is normal. No respiratory distress.      Breath sounds: Normal breath sounds. No stridor.   Abdominal:      General: Bowel sounds are normal. There is no distension.      Palpations: There is no mass.      Comments: Some ruq tenderness with deep palpations.          Result Review :                 Assessment and Plan    Problem List Items Addressed This Visit        Gastrointestinal Abdominal     Generalized abdominal pain - Primary          Follow Up   No follow-ups on file.  Patient was given instructions and counseling regarding his condition or for health maintenance advice. Please see specific information pulled into the AVS if appropriate.     Follow up with GI and dexilant samples given to pt.    "

## 2021-01-12 ENCOUNTER — LAB (OUTPATIENT)
Dept: LAB | Facility: HOSPITAL | Age: 46
End: 2021-01-12

## 2021-01-12 DIAGNOSIS — Z01.818 OTHER SPECIFIED PRE-OPERATIVE EXAMINATION: ICD-10-CM

## 2021-01-12 PROCEDURE — C9803 HOPD COVID-19 SPEC COLLECT: HCPCS

## 2021-01-12 PROCEDURE — U0004 COV-19 TEST NON-CDC HGH THRU: HCPCS

## 2021-01-13 LAB — SARS-COV-2 ORF1AB RESP QL NAA+PROBE: NOT DETECTED

## 2021-01-14 ENCOUNTER — HOSPITAL ENCOUNTER (OUTPATIENT)
Facility: HOSPITAL | Age: 46
Setting detail: HOSPITAL OUTPATIENT SURGERY
Discharge: HOME | End: 2021-01-14
Attending: INTERNAL MEDICINE | Admitting: INTERNAL MEDICINE

## 2021-01-14 ENCOUNTER — ANESTHESIA (OUTPATIENT)
Dept: GASTROENTEROLOGY | Facility: HOSPITAL | Age: 46
End: 2021-01-14

## 2021-01-14 ENCOUNTER — ANESTHESIA EVENT (OUTPATIENT)
Dept: GASTROENTEROLOGY | Facility: HOSPITAL | Age: 46
End: 2021-01-14

## 2021-01-14 VITALS
DIASTOLIC BLOOD PRESSURE: 93 MMHG | HEIGHT: 65 IN | RESPIRATION RATE: 16 BRPM | OXYGEN SATURATION: 100 % | BODY MASS INDEX: 26.62 KG/M2 | SYSTOLIC BLOOD PRESSURE: 132 MMHG | HEART RATE: 82 BPM | WEIGHT: 159.8 LBS

## 2021-01-14 DIAGNOSIS — R10.30 LOWER ABDOMINAL PAIN: ICD-10-CM

## 2021-01-14 PROCEDURE — 25010000002 PROPOFOL 10 MG/ML EMULSION: Performed by: NURSE ANESTHETIST, CERTIFIED REGISTERED

## 2021-01-14 PROCEDURE — S0260 H&P FOR SURGERY: HCPCS | Performed by: INTERNAL MEDICINE

## 2021-01-14 PROCEDURE — 88305 TISSUE EXAM BY PATHOLOGIST: CPT | Performed by: INTERNAL MEDICINE

## 2021-01-14 PROCEDURE — 45380 COLONOSCOPY AND BIOPSY: CPT | Performed by: INTERNAL MEDICINE

## 2021-01-14 PROCEDURE — 43239 EGD BIOPSY SINGLE/MULTIPLE: CPT | Performed by: INTERNAL MEDICINE

## 2021-01-14 RX ORDER — PROPOFOL 10 MG/ML
VIAL (ML) INTRAVENOUS CONTINUOUS PRN
Status: DISCONTINUED | OUTPATIENT
Start: 2021-01-14 | End: 2021-01-14 | Stop reason: SURG

## 2021-01-14 RX ORDER — SODIUM CHLORIDE, SODIUM LACTATE, POTASSIUM CHLORIDE, CALCIUM CHLORIDE 600; 310; 30; 20 MG/100ML; MG/100ML; MG/100ML; MG/100ML
30 INJECTION, SOLUTION INTRAVENOUS CONTINUOUS PRN
Status: DISCONTINUED | OUTPATIENT
Start: 2021-01-14 | End: 2021-01-14 | Stop reason: HOSPADM

## 2021-01-14 RX ORDER — GLYCOPYRROLATE 0.2 MG/ML
INJECTION INTRAMUSCULAR; INTRAVENOUS AS NEEDED
Status: DISCONTINUED | OUTPATIENT
Start: 2021-01-14 | End: 2021-01-14 | Stop reason: SURG

## 2021-01-14 RX ORDER — LIDOCAINE HYDROCHLORIDE 20 MG/ML
INJECTION, SOLUTION INFILTRATION; PERINEURAL AS NEEDED
Status: DISCONTINUED | OUTPATIENT
Start: 2021-01-14 | End: 2021-01-14 | Stop reason: SURG

## 2021-01-14 RX ORDER — SODIUM CHLORIDE 0.9 % (FLUSH) 0.9 %
3 SYRINGE (ML) INJECTION EVERY 12 HOURS SCHEDULED
Status: DISCONTINUED | OUTPATIENT
Start: 2021-01-14 | End: 2021-01-14 | Stop reason: HOSPADM

## 2021-01-14 RX ORDER — SODIUM CHLORIDE 0.9 % (FLUSH) 0.9 %
10 SYRINGE (ML) INJECTION AS NEEDED
Status: DISCONTINUED | OUTPATIENT
Start: 2021-01-14 | End: 2021-01-14 | Stop reason: HOSPADM

## 2021-01-14 RX ORDER — HYDROCORTISONE ACETATE PRAMOXINE HCL 2.5; 1 G/100G; G/100G
CREAM TOPICAL 2 TIMES DAILY
Qty: 30 G | Refills: 1 | Status: SHIPPED | OUTPATIENT
Start: 2021-01-14 | End: 2022-01-17

## 2021-01-14 RX ORDER — SULFAMETHOXAZOLE AND TRIMETHOPRIM 800; 160 MG/1; MG/1
1 TABLET ORAL 2 TIMES DAILY
Qty: 10 TABLET | Refills: 0 | Status: SHIPPED | OUTPATIENT
Start: 2021-01-14 | End: 2021-01-19

## 2021-01-14 RX ADMIN — PROPOFOL 180 MCG/KG/MIN: 10 INJECTION, EMULSION INTRAVENOUS at 13:53

## 2021-01-14 RX ADMIN — SODIUM CHLORIDE, POTASSIUM CHLORIDE, SODIUM LACTATE AND CALCIUM CHLORIDE 30 ML/HR: 600; 310; 30; 20 INJECTION, SOLUTION INTRAVENOUS at 13:41

## 2021-01-14 RX ADMIN — LIDOCAINE HYDROCHLORIDE 60 MG: 20 INJECTION, SOLUTION INFILTRATION; PERINEURAL at 13:53

## 2021-01-14 RX ADMIN — GLYCOPYRROLATE 0.2 MG: 0.2 INJECTION INTRAMUSCULAR; INTRAVENOUS at 13:52

## 2021-01-14 NOTE — ANESTHESIA PREPROCEDURE EVALUATION
Anesthesia Evaluation     Patient summary reviewed and Nursing notes reviewed   NPO Solid Status: > 8 hours  NPO Liquid Status: > 4 hours           Airway   Mallampati: II  Neck ROM: full  No difficulty expected  Dental - normal exam     Pulmonary     breath sounds clear to auscultation  Cardiovascular     Rhythm: regular    (+) hyperlipidemia,       Neuro/Psych  (+) headaches,     GI/Hepatic/Renal/Endo    (+)  GERD, PUD,  renal disease,     Musculoskeletal     (+) neck pain,   Abdominal    Substance History      OB/GYN          Other                        Anesthesia Plan    ASA 2     MAC     intravenous induction     Anesthetic plan, all risks, benefits, and alternatives have been provided, discussed and informed consent has been obtained with: patient.

## 2021-01-14 NOTE — H&P
[facility]  Pre Procedure History & Physical    Chief Complaint:   Time for my colonoscopy    Subjective     HPI:   46 y.o. male     Past Medical History:   Past Medical History:   Diagnosis Date   • Abdominal pain    • Acute seasonal allergic rhinitis due to pollen    • Eczema    • Hypertriglyceridemia    • 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    • Nephrolithiasis    • Paresthesia of left leg        Family History:  Family History   Problem Relation Age of Onset   • No Known Problems Mother        Social History:   reports that he has never smoked. He has never used smokeless tobacco. He reports that he does not drink alcohol or use drugs.    Medications:   Medications Prior to Admission   Medication Sig Dispense Refill Last Dose   • atorvastatin (LIPITOR) 20 MG tablet Take 1 tablet by mouth Daily. 30 tablet 3 1/13/2021 at Unknown time   • pantoprazole (PROTONIX) 40 MG EC tablet Take 1 tablet by mouth 2 (two) times a day. 60 tablet 5 1/13/2021 at Unknown time   • vitamin D (ERGOCALCIFEROL) 1.25 MG (96681 UT) capsule capsule Take 1 capsule by mouth 1 (One) Time Per Week. 5 capsule 5 1/13/2021 at Unknown time   • amoxicillin (AMOXIL) 875 MG tablet Take 1 tablet by mouth 2 (Two) Times a Day. 20 tablet 0 Unknown at Unknown time   • aspirin-acetaminophen-caffeine (EXCEDRIN MIGRAINE) 250-250-65 MG per tablet Take 1 tablet by mouth Daily. Take one tablet daily as needed. 60 tablet 3 Unknown at Unknown time   • aspirin-acetaminophen-caffeine (Excedrin Migraine) 250-250-65 MG per tablet Take 1 tablet by mouth Every 8 (Eight) Hours As Needed for Headache. 60 tablet 4    • cetirizine (zyrTEC) 10 MG tablet Take 1 tablet by mouth Daily. 30 tablet 5 Unknown at Unknown time   • dicyclomine (Bentyl) 20 MG tablet Take 1 tablet by mouth Every 6 (Six) Hours. 30 tablet 1 Unknown at Unknown time   • fenofibrate 160 MG tablet Take 1 tablet by mouth Daily. 30 tablet 3 Unknown at Unknown time   • hydrocortisone 1 % ointment  "Apply  topically to the appropriate area as directed 2 (Two) Times a Day. 28 g 1    • hyoscyamine (LEVSIN) 0.125 MG SL tablet Take 1 tablet by mouth Every 4 (Four) Hours As Needed for Cramping. 60 tablet 5 Unknown at Unknown time   • sucralfate (Carafate) 1 g tablet Take 1 tablet by mouth 2 (two) times a day. 60 tablet 0 Unknown at Unknown time   • sucralfate (Carafate) 1 g tablet Dissolve 1 tablet in 1 tablespoon warm water until it becomes a slurry, then drink. Do this four times per day, before meals and at bedtime. 120 tablet 6 Unknown at Unknown time       Allergies:  Patient has no known allergies.    ROS:    Pertinent items are noted in HPI     Objective     Blood pressure 126/93, pulse 84, resp. rate 18, height 165.1 cm (65\"), weight 72.5 kg (159 lb 12.8 oz), SpO2 97 %.    Physical Exam   Constitutional: Pt is oriented to person, place, and time and well-developed, well-nourished, and in no distress.   HENT:   Mouth/Throat: Oropharynx is clear and moist.   Neck: Normal range of motion. Neck supple.   Cardiovascular: Normal rate, regular rhythm and normal heart sounds.    Pulmonary/Chest: Effort normal and breath sounds normal. No respiratory distress. No  wheezes.   Abdominal: Soft. Bowel sounds are normal.   Skin: Skin is warm and dry.   Psychiatric: Mood, memory, affect and judgment normal.     Assessment/Plan     Diagnosis:  Encounter for screening for colon cancer    Anticipated Surgical Procedure:  Colonoscopy    The risks, benefits, and alternatives of this procedure have been discussed with the patient or the responsible party- the patient understands and agrees to proceed.                                                                "

## 2021-01-14 NOTE — ANESTHESIA POSTPROCEDURE EVALUATION
"Patient: Smith Chisholm    Procedure Summary     Date: 01/14/21 Room / Location:  [facility]    Anesthesia Start: 1350 Anesthesia Stop: 1410    Procedures:       ESOPHAGOGASTRODUODENOSCOPY with cold biopsies (N/A Esophagus)      COLONOSCOPY to cecum and TI with cold polypectomy (N/A ) Diagnosis:       Lower abdominal pain      (Lower abdominal pain [R10.30])    Surgeon: Francesco Chowdhury MD Provider: Henok Mansfield MD    Anesthesia Type: MAC ASA Status: 2          Anesthesia Type: MAC    Vitals  Vitals Value Taken Time   /93 01/14/21 1516   Temp     Pulse 82 01/14/21 1436   Resp 16 01/14/21 1436   SpO2 100 % 01/14/21 1436           Post Anesthesia Care and Evaluation    Patient location during evaluation: bedside  Patient participation: complete - patient participated  Level of consciousness: awake and alert  Pain management: adequate  Airway patency: patent  Anesthetic complications: No anesthetic complications    Cardiovascular status: acceptable  Respiratory status: acceptable  Hydration status: acceptable    Comments: /93 (BP Location: Left arm, Patient Position: Sitting)   Pulse 82   Resp 16   Ht 165.1 cm (65\")   Wt 72.5 kg (159 lb 12.8 oz)   SpO2 100%   BMI 26.59 kg/m²           "

## 2021-01-15 LAB
CYTO UR: NORMAL
LAB AP CASE REPORT: NORMAL
PATH REPORT.FINAL DX SPEC: NORMAL
PATH REPORT.GROSS SPEC: NORMAL

## 2021-01-18 ENCOUNTER — TELEPHONE (OUTPATIENT)
Dept: GASTROENTEROLOGY | Facility: CLINIC | Age: 46
End: 2021-01-18

## 2021-01-18 RX ORDER — SUCRALFATE 1 G/1
TABLET ORAL
Qty: 120 TABLET | Refills: 2 | Status: SHIPPED | OUTPATIENT
Start: 2021-01-18 | End: 2021-04-15

## 2021-01-18 NOTE — TELEPHONE ENCOUNTER
Called pt and advised of Dr. Chowdhury's note.  Pt verbalized understanding.     F/u appt made with Paula HARTMANN 3/12/21 @11am.    Pt requesting refill on sucralfate.  Pt stating he is still having epigastric pain. Advised would send msg to Paula HARTMANN.      Msg sent to Paula HARTMANN to cosign rx.

## 2021-01-18 NOTE — TELEPHONE ENCOUNTER
----- Message from Francesco Chowdhury MD sent at 1/15/2021 12:32 PM EST -----  Pathology benign office visit Paula 8 weeks

## 2021-01-26 ENCOUNTER — TELEPHONE (OUTPATIENT)
Dept: GASTROENTEROLOGY | Facility: CLINIC | Age: 46
End: 2021-01-26

## 2021-02-02 ENCOUNTER — TELEPHONE (OUTPATIENT)
Dept: FAMILY MEDICINE CLINIC | Facility: CLINIC | Age: 46
End: 2021-02-02

## 2021-02-02 ENCOUNTER — TELEPHONE (OUTPATIENT)
Dept: GASTROENTEROLOGY | Facility: CLINIC | Age: 46
End: 2021-02-02

## 2021-02-02 NOTE — TELEPHONE ENCOUNTER
Returned pt call, he has a c/o sharp pain in right mid abdomen.  He states this is where a polyp was removed. Pt denies n/v, blood in stool, fever.  Had a normal BM this morning. Advised would send a msg to Dr Chowdhury.      Msg sent to Dr Chowdhury.

## 2021-02-02 NOTE — TELEPHONE ENCOUNTER
----- Message from Brenton Boles sent at 2/2/2021 10:55 AM EST -----  Regarding: severe pain  Contact: [phone]  Pt is having severe pain on lright side and would like something for pain. Please call pt. Thank you

## 2021-02-02 NOTE — TELEPHONE ENCOUNTER
PT STATES THAT HE IS STILL HAVING SHARP PAINS IN HIS STOMACH AND WANTS TO KNWO IF DR WHITAKER WANTS HIM TO SCHEDULE AN APPOINTMENT OR IF A MEDICATION CAN BE CALLED IN FOR HIM.    PT CALL BACK  [phone]  PHARMACY  [facility]  [address] [phone]

## 2021-02-02 NOTE — TELEPHONE ENCOUNTER
PT IS CALLING IN STATING THAT HE HAD A SCOPE DONE IN January AND HE IS CALLING IN TO GET THE RESULTS FROM THAT.        PT CALL BACK  [phone]

## 2021-02-03 NOTE — TELEPHONE ENCOUNTER
Recent EGD with acute gastritis.  Small polyp removed from the transverse colon.  Patient calling in with significant right-sided abdominal pain.  He is asking for pain medication.  Send to ER?

## 2021-02-03 NOTE — TELEPHONE ENCOUNTER
Helene Cisneros RegSched Rep  [facility] [place]   Phone Number: [phone]             Pt is calling back as he has not heard from MD about pain med      Update to Paula.

## 2021-02-25 ENCOUNTER — OFFICE VISIT (OUTPATIENT)
Dept: FAMILY MEDICINE CLINIC | Facility: CLINIC | Age: 46
End: 2021-02-25

## 2021-02-25 DIAGNOSIS — R10.84 GENERALIZED ABDOMINAL PAIN: ICD-10-CM

## 2021-02-25 DIAGNOSIS — J02.9 SORE THROAT: Primary | ICD-10-CM

## 2021-02-25 PROCEDURE — 99213 OFFICE O/P EST LOW 20 MIN: CPT | Performed by: FAMILY MEDICINE

## 2021-02-25 RX ORDER — DICYCLOMINE HCL 20 MG
20 TABLET ORAL EVERY 6 HOURS
Qty: 30 TABLET | Refills: 1 | Status: SHIPPED | OUTPATIENT
Start: 2021-02-25 | End: 2021-03-29

## 2021-02-25 NOTE — PROGRESS NOTES
Chief Complaint   Patient presents with   • Abdominal Pain   • Sore Throat       Subjective   Smith Chisholm is a 46 y.o. male.     History of Present Illness   The patient presents today for follow-up via a phone visit due to the COVID-19 pandemic for  He has lower right side abd pains.  He has had this for long time and will be seeing a GI doctor soon.    Occasional nausea but no vomiting or diarrhea or constipation.  Sore throat since this am  He has no fever or chills. No covid contact.  No loss of taste or smell.            The following portions of the patient's history were reviewed and updated as appropriate: allergies, current medications, past family history, past medical history, past social history, past surgical history and problem list.    Review of Systems   All other systems reviewed and are negative.      Patient Active Problem List   Diagnosis   • Nephrolithiasis   • Hypertriglyceridemia   • Eczema   • Acute seasonal allergic rhinitis due to pollen   • Bilateral chronic serous otitis media   • Generalized abdominal pain   • Primary insomnia   • Migraine without aura and without status migrainosus, not intractable   • Chronic gastric ulcer without hemorrhage and without perforation   • Vitamin D deficiency   • Lower abdominal pain   • ST (sore throat)   • GERD without esophagitis       No Known Allergies      Current Outpatient Medications:   •  amoxicillin (AMOXIL) 875 MG tablet, Take 1 tablet by mouth 2 (Two) Times a Day., Disp: 20 tablet, Rfl: 0  •  aspirin-acetaminophen-caffeine (EXCEDRIN MIGRAINE) 250-250-65 MG per tablet, Take 1 tablet by mouth Daily. Take one tablet daily as needed., Disp: 60 tablet, Rfl: 3  •  aspirin-acetaminophen-caffeine (Excedrin Migraine) 250-250-65 MG per tablet, Take 1 tablet by mouth Every 8 (Eight) Hours As Needed for Headache., Disp: 60 tablet, Rfl: 4  •  atorvastatin (LIPITOR) 20 MG tablet, Take 1 tablet by mouth Daily., Disp: 30 tablet, Rfl: 3  •  cetirizine  (zyrTEC) 10 MG tablet, Take 1 tablet by mouth Daily., Disp: 30 tablet, Rfl: 5  •  dicyclomine (Bentyl) 20 MG tablet, Take 1 tablet by mouth Every 6 (Six) Hours., Disp: 30 tablet, Rfl: 1  •  fenofibrate 160 MG tablet, Take 1 tablet by mouth Daily., Disp: 30 tablet, Rfl: 3  •  Hydrocort-Pramoxine, Perianal, (Analpram HC) 2.5-1 % rectal cream, Insert  into the rectum 2 (Two) Times a Day., Disp: 30 g, Rfl: 1  •  hydrocortisone 1 % ointment, Apply  topically to the appropriate area as directed 2 (Two) Times a Day., Disp: 28 g, Rfl: 1  •  hyoscyamine (LEVSIN) 0.125 MG SL tablet, Take 1 tablet by mouth Every 4 (Four) Hours As Needed for Cramping., Disp: 60 tablet, Rfl: 5  •  pantoprazole (PROTONIX) 40 MG EC tablet, Take 1 tablet by mouth 2 (two) times a day., Disp: 60 tablet, Rfl: 5  •  sucralfate (Carafate) 1 g tablet, Dissolve 1 tablet in 1 tablespoon warm water until it becomes a slurry, then drink. Do this four times per day, before meals and at bedtime., Disp: 120 tablet, Rfl: 2  •  vitamin D (ERGOCALCIFEROL) 1.25 MG (14373 UT) capsule capsule, Take 1 capsule by mouth 1 (One) Time Per Week., Disp: 5 capsule, Rfl: 5    Past Medical History:   Diagnosis Date   • Abdominal pain    • Acute seasonal allergic rhinitis due to pollen    • Eczema    • Hypertriglyceridemia    • 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    • Nephrolithiasis    • Paresthesia of left leg        Past Surgical History:   Procedure Laterality Date   • APPENDECTOMY     • COLONOSCOPY N/A 1/14/2021    Procedure: COLONOSCOPY to cecum and TI with cold polypectomy;  Surgeon: Francesco Chowdhury MD;  Location: [facility] ENDOSCOPY;  Service: Gastroenterology;  Laterality: N/A;  pre: abdominal pain and rectal bleeding  post: internal hemorrhoids, normal TI, polyp   • ENDOSCOPY N/A 1/14/2021    Procedure: ESOPHAGOGASTRODUODENOSCOPY with cold biopsies;  Surgeon: Francesco Chowdhury MD;  Location: [facility] ENDOSCOPY;  Service: Gastroenterology;  Laterality: N/A;  pre: h/o ulcers  post:  ulcers healed, gastritis       Family History   Problem Relation Age of Onset   • No Known Problems Mother        Social History     Tobacco Use   • Smoking status: Never Smoker   • Smokeless tobacco: Never Used   Substance Use Topics   • Alcohol use: No     Frequency: Never            Objective     There were no vitals taken for this visit. Video Visit    Physical Exam  NAD AAOx3  No labored Breathing.    Lab Results   Component Value Date    BUN 19 11/13/2020    CREATININE 1.03 11/13/2020    EGFRIFNONA 79 11/13/2020    EGFRIFAFRI 96 11/13/2020    BCR 18.4 11/13/2020    K 4.2 11/13/2020    CO2 27.0 11/13/2020    CALCIUM 9.8 11/13/2020    PROTENTOTREF 7.0 11/13/2020    ALBUMIN 4.20 11/13/2020    LABIL2 1.5 11/13/2020    AST 15 11/13/2020    ALT 19 11/13/2020       WBC   Date Value Ref Range Status   12/30/2020 5.21 4.5 - 11.0 10*3/uL Final     RBC   Date Value Ref Range Status   12/30/2020 4.69 4.5 - 5.9 10*6/uL Final     Hemoglobin   Date Value Ref Range Status   12/30/2020 13.3 (L) 13.5 - 17.5 g/dL Final     Hematocrit   Date Value Ref Range Status   12/30/2020 39.4 (L) 41.0 - 53.0 % Final     MCV   Date Value Ref Range Status   12/30/2020 84.0 80.0 - 100.0 fL Final     MCH   Date Value Ref Range Status   12/30/2020 28.4 26.0 - 34.0 pg Final     MCHC   Date Value Ref Range Status   12/30/2020 33.8 31.0 - 37.0 g/dL Final     RDW   Date Value Ref Range Status   12/30/2020 13.0 12.0 - 16.8 % Final     MPV   Date Value Ref Range Status   12/30/2020 9.1 8.4 - 12.4 fL Final     Platelets   Date Value Ref Range Status   12/30/2020 257 140 - 440 10*3/uL Final     Neutrophil Rel %   Date Value Ref Range Status   12/30/2020 38.5 (L) 45 - 80 % Final     Lymphocyte Rel %   Date Value Ref Range Status   12/30/2020 47.0 15 - 50 % Final     Monocyte Rel %   Date Value Ref Range Status   12/30/2020 7.9 0 - 15 % Final     Eosinophil %   Date Value Ref Range Status   12/30/2020 5.4 0 - 7 % Final     Basophil Rel %   Date Value Ref  Range Status   12/30/2020 0.6 0 - 2 % Final     Immature Grans %   Date Value Ref Range Status   12/30/2020 0.6 0.0 - 1.0 % Final     Neutrophils Absolute   Date Value Ref Range Status   12/30/2020 2.01 2.0 - 8.8 10*3/uL Final     Lymphocytes Absolute   Date Value Ref Range Status   12/30/2020 2.45 0.7 - 5.5 10*3/uL Final     Monocytes Absolute   Date Value Ref Range Status   12/30/2020 0.41 0.0 - 1.7 10*3/uL Final     Eosinophils Absolute   Date Value Ref Range Status   12/30/2020 0.28 0.0 - 0.8 10*3/uL Final     Basophils Absolute   Date Value Ref Range Status   12/30/2020 0.03 0.0 - 0.2 10*3/uL Final     Immature Grans, Absolute   Date Value Ref Range Status   12/30/2020 0.03 0.00 - 0.10 10*3/uL Final     nRBC   Date Value Ref Range Status   12/30/2020 0 0 /100(WBC) Final       No results found for: HGBA1C    Lab Results   Component Value Date    NRXIOQPZ56 414 08/21/2020       TSH   Date Value Ref Range Status   08/21/2020 2.520 0.270 - 4.200 uIU/mL Final       No results found for: CHOL  Lab Results   Component Value Date    TRIG 777 (H) 11/13/2020     Lab Results   Component Value Date    HDL 35 (L) 11/13/2020     Lab Results   Component Value Date     (H) 11/13/2020     Lab Results   Component Value Date    VLDL 136 (H) 11/13/2020     No results found for: LDLHDL      Procedures    Assessment/Plan   Problems Addressed this Visit     Generalized abdominal pain    Relevant Medications    dicyclomine (Bentyl) 20 MG tablet      Other Visit Diagnoses     Sore throat    -  Primary    Relevant Orders    COVID-19,[facility] ROUTINE, NP/OP SWAB IN TRANSPORT MEDIA OR ESWAB 72 HR TAT - Swab, Nasopharynx      Diagnoses       Codes Comments    Sore throat    -  Primary ICD-10-CM: J02.9  ICD-9-CM: 462     Generalized abdominal pain     ICD-10-CM: R10.84  ICD-9-CM: 789.07           Orders Placed This Encounter   Procedures   • COVID-19,[facility] ROUTINE, NP/OP SWAB IN TRANSPORT MEDIA OR ESWAB 72 HR TAT - Swab, Nasopharynx      Order Specific Question:   Previously tested for COVID-19?     Answer:   Yes     Order Specific Question:   Employed in healthcare setting?     Answer:   No     Order Specific Question:   Symptomatic for COVID-19 as defined by CDC?     Answer:   Yes     Order Specific Question:   Date of Symptom Onset     Answer:   2/24/2021     Order Specific Question:   Hospitalized for COVID-19?     Answer:   No     Order Specific Question:   Admitted to ICU for COVID-19?     Answer:   No     Order Specific Question:   Resident in a congregate (group) care setting?     Answer:   No       Current Outpatient Medications   Medication Sig Dispense Refill   • amoxicillin (AMOXIL) 875 MG tablet Take 1 tablet by mouth 2 (Two) Times a Day. 20 tablet 0   • aspirin-acetaminophen-caffeine (EXCEDRIN MIGRAINE) 250-250-65 MG per tablet Take 1 tablet by mouth Daily. Take one tablet daily as needed. 60 tablet 3   • aspirin-acetaminophen-caffeine (Excedrin Migraine) 250-250-65 MG per tablet Take 1 tablet by mouth Every 8 (Eight) Hours As Needed for Headache. 60 tablet 4   • atorvastatin (LIPITOR) 20 MG tablet Take 1 tablet by mouth Daily. 30 tablet 3   • cetirizine (zyrTEC) 10 MG tablet Take 1 tablet by mouth Daily. 30 tablet 5   • dicyclomine (Bentyl) 20 MG tablet Take 1 tablet by mouth Every 6 (Six) Hours. 30 tablet 1   • fenofibrate 160 MG tablet Take 1 tablet by mouth Daily. 30 tablet 3   • Hydrocort-Pramoxine, Perianal, (Analpram HC) 2.5-1 % rectal cream Insert  into the rectum 2 (Two) Times a Day. 30 g 1   • hydrocortisone 1 % ointment Apply  topically to the appropriate area as directed 2 (Two) Times a Day. 28 g 1   • hyoscyamine (LEVSIN) 0.125 MG SL tablet Take 1 tablet by mouth Every 4 (Four) Hours As Needed for Cramping. 60 tablet 5   • pantoprazole (PROTONIX) 40 MG EC tablet Take 1 tablet by mouth 2 (two) times a day. 60 tablet 5   • sucralfate (Carafate) 1 g tablet Dissolve 1 tablet in 1 tablespoon warm water until it becomes a  slurry, then drink. Do this four times per day, before meals and at bedtime. 120 tablet 2   • vitamin D (ERGOCALCIFEROL) 1.25 MG (57778 UT) capsule capsule Take 1 capsule by mouth 1 (One) Time Per Week. 5 capsule 5     No current facility-administered medications for this visit.        No follow-ups on file.    There are no Patient Instructions on file for this visit.         Time spent on visit:  14   minutes.  This patient has consented to a telehealth visit via phone. The visit was scheduled as a phone visit to comply with patient safety concerns in accordance with CDC recommendations.  All vitals recorded within this visit are reported by the patient.  Symptomatic treatment and otc meds and fluids and rest.  Follow up if no better.      Use acetaminophen and/or ibuprofen for fever or body aches/pain. You may treat cough with otc medication.  Go to the ER if you develop severe symptoms of shortness of breath, altered mental status, or lethargy.  Current CDC recommendations advise home quarantine at least 10 days from onset of your symptoms AND you must also be fever free without fever reducing medication for 24 hours and also be experiencing improvement of symptoms. These quarantine guidelines are to be followed even if you have a negative test.     Will get tested today for  covid 19.    Restart bentyl and follow up with GI as scheduled.

## 2021-02-26 LAB — SARS-COV-2 RNA RESP QL NAA+PROBE: NOT DETECTED

## 2021-03-03 ENCOUNTER — TELEPHONE (OUTPATIENT)
Dept: FAMILY MEDICINE CLINIC | Facility: CLINIC | Age: 46
End: 2021-03-03

## 2021-03-03 RX ORDER — OMEPRAZOLE 20 MG/1
CAPSULE, DELAYED RELEASE ORAL
COMMUNITY
Start: 2020-12-15 | End: 2022-01-17

## 2021-03-03 RX ORDER — IBUPROFEN 200 MG
200 TABLET ORAL EVERY 6 HOURS PRN
Qty: 60 TABLET | Refills: 3 | Status: SHIPPED | OUTPATIENT
Start: 2021-03-03 | End: 2022-01-17

## 2021-03-03 RX ORDER — IBUPROFEN 200 MG
200 TABLET ORAL EVERY 6 HOURS PRN
Qty: 40 TABLET | Refills: 0 | Status: CANCELLED | OUTPATIENT
Start: 2021-03-03

## 2021-03-03 NOTE — TELEPHONE ENCOUNTER
PATIENT REQUESTING  HIS COVID RESULTS.PATIENT TEST WAS DONE LAST WEEK. PLEASE ADVISE.     CALLBACK NUMBER [phone]

## 2021-03-03 NOTE — TELEPHONE ENCOUNTER
Spoke with patient.  Advised of the salt water and IBUprofen.  Patient asked if we could send in the IBUprofen, advised he could use OTC. Asked if we could still send in to pharmacy.  Will send message to Dr. Us.  Advised pt he is out of the office today.

## 2021-03-12 ENCOUNTER — OFFICE VISIT (OUTPATIENT)
Dept: GASTROENTEROLOGY | Facility: CLINIC | Age: 46
End: 2021-03-12

## 2021-03-12 VITALS — HEIGHT: 65 IN | TEMPERATURE: 99 F | BODY MASS INDEX: 27.76 KG/M2 | WEIGHT: 166.6 LBS

## 2021-03-12 DIAGNOSIS — Z86.010 PERSONAL HISTORY OF COLONIC POLYPS: ICD-10-CM

## 2021-03-12 DIAGNOSIS — R10.11 RIGHT UPPER QUADRANT ABDOMINAL PAIN: Primary | ICD-10-CM

## 2021-03-12 DIAGNOSIS — E78.2 HYPERLIPEMIA, MIXED: ICD-10-CM

## 2021-03-12 DIAGNOSIS — K29.00 ACUTE SUPERFICIAL GASTRITIS WITHOUT HEMORRHAGE: ICD-10-CM

## 2021-03-12 PROCEDURE — 99214 OFFICE O/P EST MOD 30 MIN: CPT | Performed by: NURSE PRACTITIONER

## 2021-03-12 RX ORDER — ATORVASTATIN CALCIUM 20 MG/1
20 TABLET, FILM COATED ORAL DAILY
Qty: 30 TABLET | Refills: 3 | Status: SHIPPED | OUTPATIENT
Start: 2021-03-12 | End: 2021-07-19

## 2021-03-12 NOTE — PROGRESS NOTES
Chief Complaint   Patient presents with   • Abdominal Pain     HPI    Smith Chisholm is a  46 y.o. male here for a follow up visit for abdominal pain.  This patient follows with myself and Dr. Chowdhury status post bidirectional endoscopic evaluation on 1/14/2021 which I reviewed as follows:    EGD Acute gastritis otherwise normal.  Colonoscopy Normal ileum, polyp, nonbleeding internal hemorrhoids.  Recall 5 years.  Pathology was benign.    On visit today still complaining of right-sided abdominal pain seems localized to the right upper quadrant.  Pain described as aching sensation that comes and goes.  No identifiable dietary triggers.  More of a mild pain since endoscopy.  Since January he has been on Protonix twice daily and Carafate twice daily.  No nausea, vomiting, dyspepsia, dysphagia, or odynophagia.  His weight is stable.  His appetite is good.    No diarrhea, constipation, or rectal bleeding.    Additional data reviewed:    KUB 6/8/2020 with nonobstructive bowel gas.  Mild stool throughout the colon.  HIDA scan 8/10/2020 with ejection fraction of 61%.    Past Medical History:   Diagnosis Date   • Abdominal pain    • Acute seasonal allergic rhinitis due to pollen    • Eczema    • Hypertriglyceridemia    • 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    • Migraines    • Nephrolithiasis    • Paresthesia of left leg        Past Surgical History:   Procedure Laterality Date   • APPENDECTOMY     • COLONOSCOPY N/A 1/14/2021    Procedure: COLONOSCOPY to cecum and TI with cold polypectomy;  Surgeon: Francesco Chowdhury MD;  Location: [facility] ENDOSCOPY;  Service: Gastroenterology;  Laterality: N/A;  pre: abdominal pain and rectal bleeding  post: internal hemorrhoids, normal TI, polyp   • ENDOSCOPY N/A 1/14/2021    Procedure: ESOPHAGOGASTRODUODENOSCOPY with cold biopsies;  Surgeon: Francesco Chowdhury MD;  Location: [facility] ENDOSCOPY;  Service: Gastroenterology;  Laterality: N/A;  pre: h/o ulcers  post: ulcers healed, gastritis       Scheduled  Meds:  Outpatient Encounter Medications as of 3/12/2021   Medication Sig Dispense Refill   • dicyclomine (Bentyl) 20 MG tablet Take 1 tablet by mouth Every 6 (Six) Hours. 30 tablet 1   • pantoprazole (PROTONIX) 40 MG EC tablet Take 1 tablet by mouth 2 (two) times a day. 60 tablet 5   • sucralfate (Carafate) 1 g tablet Dissolve 1 tablet in 1 tablespoon warm water until it becomes a slurry, then drink. Do this four times per day, before meals and at bedtime. 120 tablet 2   • vitamin D (ERGOCALCIFEROL) 1.25 MG (58830 UT) capsule capsule Take 1 capsule by mouth 1 (One) Time Per Week. 5 capsule 5   • aspirin-acetaminophen-caffeine (Excedrin Migraine) 250-250-65 MG per tablet Take 1 tablet by mouth Every 8 (Eight) Hours As Needed for Headache. 60 tablet 4   • atorvastatin (LIPITOR) 20 MG tablet TAKE 1 TABLET BY MOUTH DAILY 30 tablet 3   • cetirizine (zyrTEC) 10 MG tablet Take 1 tablet by mouth Daily. 30 tablet 5   • fenofibrate 160 MG tablet Take 1 tablet by mouth Daily. 30 tablet 3   • Hydrocort-Pramoxine, Perianal, (Analpram HC) 2.5-1 % rectal cream Insert  into the rectum 2 (Two) Times a Day. 30 g 1   • hydrocortisone 1 % ointment Apply  topically to the appropriate area as directed 2 (Two) Times a Day. 28 g 1   • hyoscyamine (LEVSIN) 0.125 MG SL tablet Take 1 tablet by mouth Every 4 (Four) Hours As Needed for Cramping. 60 tablet 5   • ibuprofen (Advil) 200 MG tablet Take 1 tablet by mouth Every 6 (Six) Hours As Needed for Moderate Pain  or Fever. 60 tablet 3   • omeprazole (priLOSEC) 20 MG capsule      • [DISCONTINUED] amoxicillin (AMOXIL) 875 MG tablet Take 1 tablet by mouth 2 (Two) Times a Day. 20 tablet 0   • [DISCONTINUED] aspirin-acetaminophen-caffeine (EXCEDRIN MIGRAINE) 250-250-65 MG per tablet Take 1 tablet by mouth Daily. Take one tablet daily as needed. 60 tablet 3   • [DISCONTINUED] atorvastatin (LIPITOR) 20 MG tablet Take 1 tablet by mouth Daily. 30 tablet 3     No facility-administered encounter  medications on file as of 3/12/2021.       Continuous Infusions:No current facility-administered medications for this visit.      PRN Meds:.    No Known Allergies    Social History     Socioeconomic History   • Marital status:      Spouse name: Not on file   • Number of children: Not on file   • Years of education: Not on file   • Highest education level: Not on file   Tobacco Use   • Smoking status: Never Smoker   • Smokeless tobacco: Never Used   Substance and Sexual Activity   • Alcohol use: No   • Drug use: No   • Sexual activity: Defer       Family History   Problem Relation Age of Onset   • No Known Problems Mother        Review of Systems   Constitutional: Negative for activity change, appetite change, fatigue, fever and unexpected weight change.   HENT: Negative for trouble swallowing.    Respiratory: Negative for apnea, cough, choking, chest tightness, shortness of breath and wheezing.    Cardiovascular: Negative for chest pain, palpitations and leg swelling.   Gastrointestinal: Positive for abdominal pain. Negative for abdominal distention, anal bleeding, blood in stool, constipation, diarrhea, nausea, rectal pain and vomiting.       Vitals:    03/12/21 1053   Temp: 99 °F (37.2 °C)       Physical Exam  Constitutional:       Appearance: He is well-developed.   Cardiovascular:      Rate and Rhythm: Normal rate and regular rhythm.      Heart sounds: Normal heart sounds.   Pulmonary:      Effort: Pulmonary effort is normal. No respiratory distress.      Breath sounds: Normal breath sounds. No wheezing.   Abdominal:      General: Bowel sounds are normal. There is no distension.      Palpations: Abdomen is soft. There is no mass.      Tenderness: There is abdominal tenderness. There is no guarding.      Hernia: No hernia is present.   Neurological:      Mental Status: He is alert and oriented to person, place, and time.   Psychiatric:         Behavior: Behavior normal.       Assessment    Right upper  quadrant abdominal pain  Acute gastritis  Personal history of colon polyps    Plan    46-year-old male seen today in follow-up with continued right-sided upper abdominal pain despite twice daily dosing PPI therapy and twice daily dosing Carafate to resolve acute gastritis seen on recent EGD.  Normal HIDA scan as well.  At this point recommend dedicated CT abdomen with IV and oral contrast.  Continue twice daily dosing PPI therapy.  Continue twice daily dosing Carafate.  Antireflux dietary precautions reviewed with the patient.  GERD diet and lifestyle modification handout provided to the patient.  Further recommendations pending the aforementioned work-up.

## 2021-03-17 DIAGNOSIS — E55.9 VITAMIN D DEFICIENCY: ICD-10-CM

## 2021-03-17 RX ORDER — ERGOCALCIFEROL 1.25 MG/1
50000 CAPSULE ORAL WEEKLY
Qty: 12 CAPSULE | Refills: 0 | Status: SHIPPED | OUTPATIENT
Start: 2021-03-17 | End: 2022-01-18 | Stop reason: SDUPTHER

## 2021-03-28 DIAGNOSIS — R10.84 GENERALIZED ABDOMINAL PAIN: ICD-10-CM

## 2021-03-29 RX ORDER — DICYCLOMINE HCL 20 MG
TABLET ORAL
Qty: 30 TABLET | Refills: 1 | Status: SHIPPED | OUTPATIENT
Start: 2021-03-29 | End: 2022-01-17

## 2021-03-29 NOTE — TELEPHONE ENCOUNTER
Patient picked up 2/25/2021 and 3/10/2021. Does patient need to take one every 6 hours or one once a day? Please advise.

## 2021-04-02 ENCOUNTER — BULK ORDERING (OUTPATIENT)
Dept: CASE MANAGEMENT | Facility: OTHER | Age: 46
End: 2021-04-02

## 2021-04-02 DIAGNOSIS — Z23 IMMUNIZATION DUE: ICD-10-CM

## 2021-04-15 RX ORDER — SUCRALFATE 1 G/1
TABLET ORAL
Qty: 120 TABLET | Refills: 2 | Status: SHIPPED | OUTPATIENT
Start: 2021-04-15 | End: 2022-01-17

## 2021-04-16 ENCOUNTER — APPOINTMENT (OUTPATIENT)
Dept: CT IMAGING | Facility: HOSPITAL | Age: 46
End: 2021-04-16

## 2021-05-17 ENCOUNTER — APPOINTMENT (OUTPATIENT)
Dept: CT IMAGING | Facility: HOSPITAL | Age: 46
End: 2021-05-17

## 2021-05-27 ENCOUNTER — HOSPITAL ENCOUNTER (OUTPATIENT)
Dept: CT IMAGING | Facility: HOSPITAL | Age: 46
Discharge: HOME | End: 2021-05-27
Admitting: NURSE PRACTITIONER

## 2021-05-27 DIAGNOSIS — R10.11 RIGHT UPPER QUADRANT ABDOMINAL PAIN: ICD-10-CM

## 2021-05-27 PROCEDURE — 25010000002 IOPAMIDOL 61 % SOLUTION: Performed by: NURSE PRACTITIONER

## 2021-05-27 PROCEDURE — 74160 CT ABDOMEN W/CONTRAST: CPT

## 2021-05-27 PROCEDURE — 0 DIATRIZOATE MEGLUMINE & SODIUM PER 1 ML: Performed by: NURSE PRACTITIONER

## 2021-05-27 RX ADMIN — IOPAMIDOL 85 ML: 612 INJECTION, SOLUTION INTRAVENOUS at 13:24

## 2021-05-27 RX ADMIN — DIATRIZOATE MEGLUMINE AND DIATRIZOATE SODIUM 30 ML: 600; 100 SOLUTION ORAL; RECTAL at 13:24

## 2021-05-27 NOTE — PROGRESS NOTES
Please inform the patient that his CAT scan shows fatty liver and bilateral kidney cysts.  Treatment for fatty liver disease includes low-fat diet, increase cardiovascular exercise to achieve an ideal BMI.  Also recommend referral to nutritionist for guidance.  Arrange a follow-up with Dr. Chowdhury.

## 2021-06-01 ENCOUNTER — TELEPHONE (OUTPATIENT)
Dept: GASTROENTEROLOGY | Facility: CLINIC | Age: 46
End: 2021-06-01

## 2021-06-01 ENCOUNTER — TELEPHONE (OUTPATIENT)
Dept: FAMILY MEDICINE CLINIC | Facility: CLINIC | Age: 46
End: 2021-06-01

## 2021-06-01 NOTE — TELEPHONE ENCOUNTER
----- Message from Brenton Savage sent at 6/1/2021 11:11 AM EDT -----  Regarding: ct results  Contact: [phone]  Pt is calling wanting his ct results.

## 2021-06-01 NOTE — TELEPHONE ENCOUNTER
Per Paula:   Please inform the patient that his CAT scan shows fatty liver and bilateral kidney cysts.  Treatment for fatty liver disease includes low-fat diet, increase cardiovascular exercise to achieve an ideal BMI.  Also recommend referral to nutritionist for guidance.  Arrange a follow-up with Dr. Chowdhury.

## 2021-06-01 NOTE — TELEPHONE ENCOUNTER
Returned patient's phone call. Advised as per Dr. Chowdhury's note. He verb understanding. F/u visit with Dr. Chowdhury(per [facility]) scheduled for 8/9/21@1600.  Update to Mary Pozo to refer to dietitian.

## 2021-06-01 NOTE — TELEPHONE ENCOUNTER
PATIENT CALLED IN STATING HE'S HAVING PAIN ON THE RIGHT SIDE OF HIS STOMACH, PATIENT WANTS TO KNOW IF HE CAN BE SEEN SOONER THAN NEXT WEEK Monday?    BEST CALL BACK # [phone]

## 2021-06-02 ENCOUNTER — HOSPITAL ENCOUNTER (EMERGENCY)
Facility: HOSPITAL | Age: 46
Discharge: HOME | End: 2021-06-03
Attending: EMERGENCY MEDICINE | Admitting: EMERGENCY MEDICINE

## 2021-06-02 DIAGNOSIS — R10.84 GENERALIZED ABDOMINAL PAIN: Primary | ICD-10-CM

## 2021-06-02 LAB
ALBUMIN SERPL-MCNC: 4.1 G/DL (ref 3.5–5.2)
ALBUMIN/GLOB SERPL: 1.4 G/DL
ALP SERPL-CCNC: 113 U/L (ref 39–117)
ALT SERPL W P-5'-P-CCNC: 47 U/L (ref 1–41)
ANION GAP SERPL CALCULATED.3IONS-SCNC: 8.8 MMOL/L (ref 5–15)
AST SERPL-CCNC: 46 U/L (ref 1–40)
BACTERIA UR QL AUTO: NORMAL /HPF
BASOPHILS # BLD AUTO: 0.03 10*3/MM3 (ref 0–0.2)
BASOPHILS NFR BLD AUTO: 0.4 % (ref 0–1.5)
BILIRUB SERPL-MCNC: 0.5 MG/DL (ref 0–1.2)
BILIRUB UR QL STRIP: NEGATIVE
BUN SERPL-MCNC: 9 MG/DL (ref 6–20)
BUN/CREAT SERPL: 11.1 (ref 7–25)
CALCIUM SPEC-SCNC: 9 MG/DL (ref 8.6–10.5)
CHLORIDE SERPL-SCNC: 101 MMOL/L (ref 98–107)
CLARITY UR: CLEAR
CO2 SERPL-SCNC: 26.2 MMOL/L (ref 22–29)
COLOR UR: YELLOW
CREAT SERPL-MCNC: 0.81 MG/DL (ref 0.76–1.27)
DEPRECATED RDW RBC AUTO: 42.3 FL (ref 37–54)
EOSINOPHIL # BLD AUTO: 0.27 10*3/MM3 (ref 0–0.4)
EOSINOPHIL NFR BLD AUTO: 3.7 % (ref 0.3–6.2)
ERYTHROCYTE [DISTWIDTH] IN BLOOD BY AUTOMATED COUNT: 14.3 % (ref 12.3–15.4)
GFR SERPL CREATININE-BSD FRML MDRD: 104 ML/MIN/1.73
GFR SERPL CREATININE-BSD FRML MDRD: 125 ML/MIN/1.73
GLOBULIN UR ELPH-MCNC: 3 GM/DL
GLUCOSE SERPL-MCNC: 113 MG/DL (ref 65–99)
GLUCOSE UR STRIP-MCNC: NEGATIVE MG/DL
HCT VFR BLD AUTO: 40.1 % (ref 37.5–51)
HGB BLD-MCNC: 13.7 G/DL (ref 13–17.7)
HGB UR QL STRIP.AUTO: ABNORMAL
HOLD SPECIMEN: NORMAL
HOLD SPECIMEN: NORMAL
HYALINE CASTS UR QL AUTO: NORMAL /LPF
IMM GRANULOCYTES # BLD AUTO: 0.03 10*3/MM3 (ref 0–0.05)
IMM GRANULOCYTES NFR BLD AUTO: 0.4 % (ref 0–0.5)
KETONES UR QL STRIP: NEGATIVE
LEUKOCYTE ESTERASE UR QL STRIP.AUTO: NEGATIVE
LIPASE SERPL-CCNC: 48 U/L (ref 13–60)
LYMPHOCYTES # BLD AUTO: 3.46 10*3/MM3 (ref 0.7–3.1)
LYMPHOCYTES NFR BLD AUTO: 46.9 % (ref 19.6–45.3)
MCH RBC QN AUTO: 28.1 PG (ref 26.6–33)
MCHC RBC AUTO-ENTMCNC: 34.2 G/DL (ref 31.5–35.7)
MCV RBC AUTO: 82.3 FL (ref 79–97)
MONOCYTES # BLD AUTO: 0.53 10*3/MM3 (ref 0.1–0.9)
MONOCYTES NFR BLD AUTO: 7.2 % (ref 5–12)
NEUTROPHILS NFR BLD AUTO: 3.05 10*3/MM3 (ref 1.7–7)
NEUTROPHILS NFR BLD AUTO: 41.4 % (ref 42.7–76)
NITRITE UR QL STRIP: NEGATIVE
NRBC BLD AUTO-RTO: 0 /100 WBC (ref 0–0.2)
PH UR STRIP.AUTO: 6 [PH] (ref 5–8)
PLATELET # BLD AUTO: 226 10*3/MM3 (ref 140–450)
PMV BLD AUTO: 10.6 FL (ref 6–12)
POTASSIUM SERPL-SCNC: 3.9 MMOL/L (ref 3.5–5.2)
PROT SERPL-MCNC: 7.1 G/DL (ref 6–8.5)
PROT UR QL STRIP: ABNORMAL
RBC # BLD AUTO: 4.87 10*6/MM3 (ref 4.14–5.8)
RBC # UR: NORMAL /HPF
REF LAB TEST METHOD: NORMAL
SODIUM SERPL-SCNC: 136 MMOL/L (ref 136–145)
SP GR UR STRIP: 1.01 (ref 1–1.03)
SQUAMOUS #/AREA URNS HPF: NORMAL /HPF
UROBILINOGEN UR QL STRIP: ABNORMAL
WBC # BLD AUTO: 7.37 10*3/MM3 (ref 3.4–10.8)
WBC UR QL AUTO: NORMAL /HPF
WHOLE BLOOD HOLD SPECIMEN: NORMAL
WHOLE BLOOD HOLD SPECIMEN: NORMAL

## 2021-06-02 PROCEDURE — 85025 COMPLETE CBC W/AUTO DIFF WBC: CPT | Performed by: EMERGENCY MEDICINE

## 2021-06-02 PROCEDURE — 96374 THER/PROPH/DIAG INJ IV PUSH: CPT

## 2021-06-02 PROCEDURE — 96375 TX/PRO/DX INJ NEW DRUG ADDON: CPT

## 2021-06-02 PROCEDURE — 99283 EMERGENCY DEPT VISIT LOW MDM: CPT

## 2021-06-02 PROCEDURE — 25010000002 ONDANSETRON PER 1 MG: Performed by: EMERGENCY MEDICINE

## 2021-06-02 PROCEDURE — 83690 ASSAY OF LIPASE: CPT | Performed by: EMERGENCY MEDICINE

## 2021-06-02 PROCEDURE — 25010000002 HYDROMORPHONE PER 4 MG: Performed by: EMERGENCY MEDICINE

## 2021-06-02 PROCEDURE — 80053 COMPREHEN METABOLIC PANEL: CPT | Performed by: EMERGENCY MEDICINE

## 2021-06-02 PROCEDURE — 81001 URINALYSIS AUTO W/SCOPE: CPT

## 2021-06-02 RX ORDER — ONDANSETRON 2 MG/ML
4 INJECTION INTRAMUSCULAR; INTRAVENOUS ONCE
Status: COMPLETED | OUTPATIENT
Start: 2021-06-02 | End: 2021-06-02

## 2021-06-02 RX ORDER — HYDROMORPHONE HYDROCHLORIDE 1 MG/ML
0.5 INJECTION, SOLUTION INTRAMUSCULAR; INTRAVENOUS; SUBCUTANEOUS ONCE
Status: COMPLETED | OUTPATIENT
Start: 2021-06-02 | End: 2021-06-02

## 2021-06-02 RX ADMIN — SODIUM CHLORIDE 1000 ML: 9 INJECTION, SOLUTION INTRAVENOUS at 23:45

## 2021-06-02 RX ADMIN — ONDANSETRON 4 MG: 2 INJECTION INTRAMUSCULAR; INTRAVENOUS at 23:45

## 2021-06-02 RX ADMIN — HYDROMORPHONE HYDROCHLORIDE 0.5 MG: 1 INJECTION, SOLUTION INTRAMUSCULAR; INTRAVENOUS; SUBCUTANEOUS at 23:45

## 2021-06-03 VITALS
DIASTOLIC BLOOD PRESSURE: 93 MMHG | RESPIRATION RATE: 16 BRPM | OXYGEN SATURATION: 100 % | SYSTOLIC BLOOD PRESSURE: 122 MMHG | BODY MASS INDEX: 27.66 KG/M2 | WEIGHT: 166 LBS | TEMPERATURE: 97.7 F | HEART RATE: 89 BPM | HEIGHT: 65 IN

## 2021-06-03 RX ORDER — ONDANSETRON 8 MG/1
8 TABLET, ORALLY DISINTEGRATING ORAL EVERY 8 HOURS PRN
Qty: 12 TABLET | Refills: 0 | Status: SHIPPED | OUTPATIENT
Start: 2021-06-03 | End: 2022-01-17

## 2021-06-03 RX ORDER — HYDROCODONE BITARTRATE AND ACETAMINOPHEN 5; 325 MG/1; MG/1
1 TABLET ORAL EVERY 4 HOURS PRN
Qty: 16 TABLET | Refills: 0 | Status: SHIPPED | OUTPATIENT
Start: 2021-06-03 | End: 2022-09-17

## 2021-06-03 NOTE — ED PROVIDER NOTES
EMERGENCY DEPARTMENT ENCOUNTER    CHIEF COMPLAINT  Chief Complaint: Abdominal pain  History given by: Patient  History limited by: None  Room Number: 13/13  PMD: Roseann Us MD      HPI:  Pt is a 46 y.o. male who presents complaining of gradual in onset and progressively worsening abdominal discomfort that has been present for approximately the last 5 days.  He states that he was seen previously for this several days ago and a CT scan showed bilateral renal cysts but without any other explanatory abnormalities.  He describes the pain as a burning sensation essentially throughout the entirety of the abdomen and nonradiating.  He states that he also has some burning with urination.  He states his symptoms are moderate in intensity and have been worsening over the last several days.  He denies any aggravating or alleviating factors or any treatment rendered prior to ED arrival.  The patient denies any nausea or vomiting, fever/chills, back pain, chest pain, or known sick contacts.      PAST MEDICAL HISTORY  Active Ambulatory Problems     Diagnosis Date Noted   • Nephrolithiasis    • Hypertriglyceridemia    • Eczema    • Acute seasonal allergic rhinitis due to pollen    • Bilateral chronic serous otitis media 11/11/2019   • Generalized abdominal pain 12/06/2019   • Primary insomnia 01/28/2020   • Migraine without aura and without status migrainosus, not intractable 06/26/2020   • Chronic gastric ulcer without hemorrhage and without perforation 07/13/2020   • Vitamin D deficiency 08/24/2020   • Lower abdominal pain 10/09/2020   • ST (sore throat) 11/05/2020   • GERD without esophagitis 11/19/2020     Resolved Ambulatory Problems     Diagnosis Date Noted   • No Resolved Ambulatory Problems     Past Medical History:   Diagnosis Date   • Abdominal pain    • 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    • Migraines    • Paresthesia of left leg        PAST SURGICAL HISTORY  Past Surgical History:   Procedure Laterality Date   •  APPENDECTOMY     • COLONOSCOPY N/A 1/14/2021    Procedure: COLONOSCOPY to cecum and TI with cold polypectomy;  Surgeon: Francesco Chowdhury MD;  Location: [facility] ENDOSCOPY;  Service: Gastroenterology;  Laterality: N/A;  pre: abdominal pain and rectal bleeding  post: internal hemorrhoids, normal TI, polyp   • ENDOSCOPY N/A 1/14/2021    Procedure: ESOPHAGOGASTRODUODENOSCOPY with cold biopsies;  Surgeon: Francesco Chowdhury MD;  Location: [facility] ENDOSCOPY;  Service: Gastroenterology;  Laterality: N/A;  pre: h/o ulcers  post: ulcers healed, gastritis       FAMILY HISTORY  Family History   Problem Relation Age of Onset   • No Known Problems Mother        SOCIAL HISTORY  Social History     Socioeconomic History   • Marital status:      Spouse name: Not on file   • Number of children: Not on file   • Years of education: Not on file   • Highest education level: Not on file   Tobacco Use   • Smoking status: Never Smoker   • Smokeless tobacco: Never Used   Substance and Sexual Activity   • Alcohol use: No   • Drug use: No   • Sexual activity: Defer       ALLERGIES  Patient has no known allergies.    REVIEW OF SYSTEMS  Review of Systems   Constitutional: Negative for activity change, appetite change and fever.   HENT: Negative for congestion and sore throat.    Eyes: Negative.    Respiratory: Negative for cough and shortness of breath.    Cardiovascular: Negative for chest pain and leg swelling.   Gastrointestinal: Positive for abdominal pain. Negative for diarrhea and vomiting.   Endocrine: Negative.    Genitourinary: Negative for decreased urine volume and dysuria.   Musculoskeletal: Negative for neck pain.   Skin: Negative for rash and wound.   Allergic/Immunologic: Negative.    Neurological: Negative for weakness, numbness and headaches.   Hematological: Negative.    Psychiatric/Behavioral: Negative.    All other systems reviewed and are negative.      PHYSICAL EXAM  ED Triage Vitals [06/02/21 2011]   Temp Heart Rate Resp  BP SpO2   97.7 °F (36.5 °C) 89 16 127/93 100 %      Temp src Heart Rate Source Patient Position BP Location FiO2 (%)   Tympanic -- -- -- --       Physical Exam  Vitals and nursing note reviewed.   Constitutional:       General: He is not in acute distress.  HENT:      Head: Normocephalic and atraumatic.   Eyes:      Pupils: Pupils are equal, round, and reactive to light.   Cardiovascular:      Rate and Rhythm: Normal rate and regular rhythm.      Heart sounds: Normal heart sounds.   Pulmonary:      Effort: Pulmonary effort is normal. No respiratory distress.      Breath sounds: Normal breath sounds.   Abdominal:      Palpations: Abdomen is soft.      Tenderness: There is generalized abdominal tenderness. There is no guarding or rebound.   Musculoskeletal:         General: Normal range of motion.      Cervical back: Normal range of motion and neck supple.   Skin:     General: Skin is warm and dry.   Neurological:      Mental Status: He is alert and oriented to person, place, and time.      Sensory: Sensation is intact.   Psychiatric:         Mood and Affect: Mood and affect normal.         LAB RESULTS  Lab Results (last 24 hours)     Procedure Component Value Units Date/Time    Urinalysis With Microscopic If Indicated (No Culture) - Urine, Clean Catch [[id number]]  (Abnormal) Collected: 06/02/21 2018    Specimen: Urine, Clean Catch Updated: 06/02/21 2050     Color, UA Yellow     Appearance, UA Clear     pH, UA 6.0     Specific Gravity, UA 1.008     Glucose, UA Negative     Ketones, UA Negative     Bilirubin, UA Negative     Blood, UA Small (1+)     Protein,  mg/dL (2+)     Leuk Esterase, UA Negative     Nitrite, UA Negative     Urobilinogen, UA 0.2 E.U./dL    Urinalysis, Microscopic Only - Urine, Clean Catch [[id number]] Collected: 06/02/21 2018    Specimen: Urine, Clean Catch Updated: 06/02/21 2050     RBC, UA 0-2 /HPF      WBC, UA 0-2 /HPF      Bacteria, UA None Seen /HPF      Squamous Epithelial Cells, UA 0-2  /HPF      Hyaline Casts, UA None Seen /LPF      Methodology Automated Microscopy    Comprehensive Metabolic Panel [[id number]]  (Abnormal) Collected: 06/02/21 2204    Specimen: Blood Updated: 06/02/21 2309     Glucose 113 mg/dL      BUN 9 mg/dL      Creatinine 0.81 mg/dL      Sodium 136 mmol/L      Potassium 3.9 mmol/L      Comment: Slight hemolysis detected by analyzer. Results may be affected.        Chloride 101 mmol/L      CO2 26.2 mmol/L      Calcium 9.0 mg/dL      Total Protein 7.1 g/dL      Albumin 4.10 g/dL      ALT (SGPT) 47 U/L      AST (SGOT) 46 U/L      Alkaline Phosphatase 113 U/L      Total Bilirubin 0.5 mg/dL      eGFR Non African Amer 104 mL/min/1.73      eGFR  African Amer 125 mL/min/1.73      Globulin 3.0 gm/dL      A/G Ratio 1.4 g/dL      BUN/Creatinine Ratio 11.1     Anion Gap 8.8 mmol/L     Narrative:      GFR Normal >60  Chronic Kidney Disease <60  Kidney Failure <15      Lipase [[id number]]  (Normal) Collected: 06/02/21 2204    Specimen: Blood Updated: 06/02/21 2247     Lipase 48 U/L     CBC & Differential [[id number]]  (Abnormal) Collected: 06/02/21 2204    Specimen: Blood Updated: 06/02/21 2314    Narrative:      The following orders were created for panel order CBC & Differential.  Procedure                               Abnormality         Status                     ---------                               -----------         ------                     CBC Auto Differential[[id number]]        Abnormal            Final result                 Please view results for these tests on the individual orders.    CBC Auto Differential [[id number]]  (Abnormal) Collected: 06/02/21 2204    Specimen: Blood Updated: 06/02/21 2314     WBC 7.37 10*3/mm3      RBC 4.87 10*6/mm3      Hemoglobin 13.7 g/dL      Hematocrit 40.1 %      MCV 82.3 fL      MCH 28.1 pg      MCHC 34.2 g/dL      RDW 14.3 %      RDW-SD 42.3 fl      MPV 10.6 fL      Platelets 226 10*3/mm3      Neutrophil % 41.4 %      Lymphocyte % 46.9 %       Monocyte % 7.2 %      Eosinophil % 3.7 %      Basophil % 0.4 %      Immature Grans % 0.4 %      Neutrophils, Absolute 3.05 10*3/mm3      Lymphocytes, Absolute 3.46 10*3/mm3      Monocytes, Absolute 0.53 10*3/mm3      Eosinophils, Absolute 0.27 10*3/mm3      Basophils, Absolute 0.03 10*3/mm3      Immature Grans, Absolute 0.03 10*3/mm3      nRBC 0.0 /100 WBC           I ordered the above labs and reviewed the results    RADIOLOGY  No orders to display        I ordered the above noted radiological studies. Interpreted by radiologist.  Reviewed by me in PACS.       PROCEDURES  Procedures      PROGRESS AND CONSULTS     The patient was wearing a facemask upon entrance into the room and remained in such throughout their visit.  I was wearing PPE including a facemask, eye protection, as well as gloves at any point entering the room and throughout the visit    0150  Upon reevaluation, the patient is sleeping and when woken states that he still has some abdominal discomfort.  I did inform him that review of CT from the other day is unremarkable and labs and urinalysis today are also normal.  We will treat the patient's discomfort and he will be stable for discharge to follow-up with his gastroenterologist.  All questions have been answered.      MEDICAL DECISION MAKING  Results were reviewed/discussed with the patient and they were also made aware of online access. Pt also made aware that some labs, such as cultures, will not be resulted during ER visit and follow up with PMD is necessary.     MDM  Number of Diagnoses or Management Options     Amount and/or Complexity of Data Reviewed  Clinical lab tests: ordered and reviewed  Tests in the radiology section of CPT®: reviewed  Tests in the medicine section of CPT®: ordered and reviewed  Review and summarize past medical records: yes (Upon medical records review, the patient was last seen and evaluated on 12/30/2020 secondary to generalized abdominal pain.  )  Independent  visualization of images, tracings, or specimens: yes (CT scan from 5/27/2021 reviewed which did show renal cysts with fatty liver but otherwise no acute abnormalities.)           DIAGNOSIS  Final diagnoses:   Generalized abdominal pain       DISPOSITION  DISCHARGE    Patient discharged in stable condition.    Reviewed implications of results, diagnosis, meds, responsibility to follow up, warning signs and symptoms of possible worsening, potential complications and reasons to return to ER.    Patient/Family voiced understanding of above instructions.    Discussed plan for discharge, as there is no emergent indication for admission. Patient referred to primary care provider for BP management due to today's BP. Pt/family is agreeable and understands need for follow up and repeat testing.  Pt is aware that discharge does not mean that nothing is wrong but it indicates no emergency is present that requires admission and they must continue care with follow-up as given below or physician of their choice.     FOLLOW-UP  Roseann Us MD  [address]  [facility] [place] [facility] [address] [phone]    Schedule an appointment as soon as possible for a visit            Medication List      New Prescriptions    HYDROcodone-acetaminophen 5-325 MG per tablet  Commonly known as: NORCO  Take 1 tablet by mouth Every 4 (Four) Hours As Needed for Moderate Pain .     ondansetron ODT 8 MG disintegrating tablet  Commonly known as: Zofran ODT  Place 1 tablet on the tongue Every 8 (Eight) Hours As Needed for Nausea or Vomiting.           Where to Get Your Medications      These medications were sent to [id number] - [place], [place] - [address] AT [facility] - [phone]  - [phone] FX [address] [facility] [address]    Hours: 24-hours Phone: [phone]   · HYDROcodone-acetaminophen 5-325 MG per tablet  · ondansetron ODT 8 MG disintegrating tablet           Latest Documented  Vital Signs:  As of 07:05 EDT  BP- 122/93 HR- 89 Temp- 97.7 °F (36.5 °C) (Tympanic) O2 sat- 100%       Kvng Cordon MD  06/03/21 0705

## 2021-06-08 ENCOUNTER — TELEMEDICINE (OUTPATIENT)
Dept: FAMILY MEDICINE CLINIC | Facility: CLINIC | Age: 46
End: 2021-06-08

## 2021-06-08 DIAGNOSIS — K76.0 FATTY LIVER: ICD-10-CM

## 2021-06-08 DIAGNOSIS — J02.9 ST (SORE THROAT): ICD-10-CM

## 2021-06-08 DIAGNOSIS — J30.1 ACUTE SEASONAL ALLERGIC RHINITIS DUE TO POLLEN: ICD-10-CM

## 2021-06-08 DIAGNOSIS — N28.1 RENAL CYST: Primary | ICD-10-CM

## 2021-06-08 PROCEDURE — 99214 OFFICE O/P EST MOD 30 MIN: CPT | Performed by: FAMILY MEDICINE

## 2021-06-08 RX ORDER — CETIRIZINE HYDROCHLORIDE 10 MG/1
10 TABLET ORAL DAILY
Qty: 30 TABLET | Refills: 5 | Status: SHIPPED | OUTPATIENT
Start: 2021-06-08 | End: 2022-07-18

## 2021-06-08 NOTE — PROGRESS NOTES
CC:  Fatty liver    Subjective   Smith Chisholm is a 46 y.o. male.     History of Present Illness   PT is here for CT follow up that was done by GI doctor.  He wants to go over it.  Fatty liver-   Wants to know what to do for this  Kidney cysts- pt is curious what to do  Drainage and sore throat and some loss of voice over past 2 days due to allergies  The following portions of the patient's history were reviewed and updated as appropriate: allergies, current medications, past family history, past medical history, past social history, past surgical history and problem list.    Review of Systems    Patient Active Problem List   Diagnosis   • Nephrolithiasis   • Hypertriglyceridemia   • Eczema   • Acute seasonal allergic rhinitis due to pollen   • Bilateral chronic serous otitis media   • Generalized abdominal pain   • Primary insomnia   • Migraine without aura and without status migrainosus, not intractable   • Chronic gastric ulcer without hemorrhage and without perforation   • Vitamin D deficiency   • Lower abdominal pain   • ST (sore throat)   • GERD without esophagitis   • Renal cyst   • Fatty liver       No Known Allergies      Current Outpatient Medications:   •  aspirin-acetaminophen-caffeine (Excedrin Migraine) 250-250-65 MG per tablet, Take 1 tablet by mouth Every 8 (Eight) Hours As Needed for Headache., Disp: 60 tablet, Rfl: 4  •  atorvastatin (LIPITOR) 20 MG tablet, TAKE 1 TABLET BY MOUTH DAILY, Disp: 30 tablet, Rfl: 3  •  cetirizine (zyrTEC) 10 MG tablet, Take 1 tablet by mouth Daily., Disp: 30 tablet, Rfl: 5  •  dicyclomine (BENTYL) 20 MG tablet, TAKE 1 TABLET BY MOUTH EVERY 6 HOURS, Disp: 30 tablet, Rfl: 1  •  fenofibrate 160 MG tablet, Take 1 tablet by mouth Daily., Disp: 30 tablet, Rfl: 3  •  HYDROcodone-acetaminophen (NORCO) 5-325 MG per tablet, Take 1 tablet by mouth Every 4 (Four) Hours As Needed for Moderate Pain ., Disp: 16 tablet, Rfl: 0  •  Hydrocort-Pramoxine, Perianal, (Analpram HC) 2.5-1 %  rectal cream, Insert  into the rectum 2 (Two) Times a Day., Disp: 30 g, Rfl: 1  •  hydrocortisone 1 % ointment, Apply  topically to the appropriate area as directed 2 (Two) Times a Day., Disp: 28 g, Rfl: 1  •  hyoscyamine (LEVSIN) 0.125 MG SL tablet, Take 1 tablet by mouth Every 4 (Four) Hours As Needed for Cramping., Disp: 60 tablet, Rfl: 5  •  ibuprofen (Advil) 200 MG tablet, Take 1 tablet by mouth Every 6 (Six) Hours As Needed for Moderate Pain  or Fever., Disp: 60 tablet, Rfl: 3  •  omeprazole (priLOSEC) 20 MG capsule, , Disp: , Rfl:   •  ondansetron ODT (Zofran ODT) 8 MG disintegrating tablet, Place 1 tablet on the tongue Every 8 (Eight) Hours As Needed for Nausea or Vomiting., Disp: 12 tablet, Rfl: 0  •  pantoprazole (PROTONIX) 40 MG EC tablet, Take 1 tablet by mouth 2 (two) times a day., Disp: 60 tablet, Rfl: 5  •  sucralfate (CARAFATE) 1 g tablet, DISSOLVE 1 TABLET IN 1 TABLESPOON OF WARM WATER UNTIL IT BECOMES A SLURRY, THEN DRINK. DO THIS FOUR TIMES DAILY, BEFORE MEALS AND AT BEDTIME., Disp: 120 tablet, Rfl: 2  •  vitamin D (ERGOCALCIFEROL) 1.25 MG (66771 UT) capsule capsule, Take 1 capsule by mouth 1 (One) Time Per Week., Disp: 12 capsule, Rfl: 0    Past Medical History:   Diagnosis Date   • Abdominal pain    • Acute seasonal allergic rhinitis due to pollen    • Eczema    • Hypertriglyceridemia    • 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    • Migraines    • Nephrolithiasis    • Paresthesia of left leg        Past Surgical History:   Procedure Laterality Date   • APPENDECTOMY     • COLONOSCOPY N/A 1/14/2021    Procedure: COLONOSCOPY to cecum and TI with cold polypectomy;  Surgeon: Francesco Chowdhury MD;  Location: [facility] ENDOSCOPY;  Service: Gastroenterology;  Laterality: N/A;  pre: abdominal pain and rectal bleeding  post: internal hemorrhoids, normal TI, polyp   • ENDOSCOPY N/A 1/14/2021    Procedure: ESOPHAGOGASTRODUODENOSCOPY with cold biopsies;  Surgeon: Francesco Chowdhury MD;  Location: [facility] ENDOSCOPY;  Service:  Gastroenterology;  Laterality: N/A;  pre: h/o ulcers  post: ulcers healed, gastritis       Family History   Problem Relation Age of Onset   • No Known Problems Mother        Social History     Tobacco Use   • Smoking status: Never Smoker   • Smokeless tobacco: Never Used   Substance Use Topics   • Alcohol use: No            Objective     There were no vitals taken for this visit.    Physical Exam    Lab Results   Component Value Date    GLUCOSE 113 (H) 06/02/2021    BUN 9 06/02/2021    CREATININE 0.81 06/02/2021    EGFRIFNONA 104 06/02/2021    EGFRIFAFRI 125 06/02/2021    BCR 11.1 06/02/2021    K 3.9 06/02/2021    CO2 26.2 06/02/2021    CALCIUM 9.0 06/02/2021    PROTENTOTREF 7.0 11/13/2020    ALBUMIN 4.10 06/02/2021    LABIL2 1.5 11/13/2020    AST 46 (H) 06/02/2021    ALT 47 (H) 06/02/2021       WBC   Date Value Ref Range Status   06/02/2021 7.37 3.40 - 10.80 10*3/mm3 Final   12/30/2020 5.21 4.5 - 11.0 10*3/uL Final     RBC   Date Value Ref Range Status   06/02/2021 4.87 4.14 - 5.80 10*6/mm3 Final   12/30/2020 4.69 4.5 - 5.9 10*6/uL Final     Hemoglobin   Date Value Ref Range Status   06/02/2021 13.7 13.0 - 17.7 g/dL Final   12/30/2020 13.3 (L) 13.5 - 17.5 g/dL Final     Hematocrit   Date Value Ref Range Status   06/02/2021 40.1 37.5 - 51.0 % Final   12/30/2020 39.4 (L) 41.0 - 53.0 % Final     MCV   Date Value Ref Range Status   06/02/2021 82.3 79.0 - 97.0 fL Final   12/30/2020 84.0 80.0 - 100.0 fL Final     MCH   Date Value Ref Range Status   06/02/2021 28.1 26.6 - 33.0 pg Final   12/30/2020 28.4 26.0 - 34.0 pg Final     MCHC   Date Value Ref Range Status   06/02/2021 34.2 31.5 - 35.7 g/dL Final   12/30/2020 33.8 31.0 - 37.0 g/dL Final     RDW   Date Value Ref Range Status   06/02/2021 14.3 12.3 - 15.4 % Final   12/30/2020 13.0 12.0 - 16.8 % Final     RDW-SD   Date Value Ref Range Status   06/02/2021 42.3 37.0 - 54.0 fl Final     MPV   Date Value Ref Range Status   06/02/2021 10.6 6.0 - 12.0 fL Final   12/30/2020  9.1 8.4 - 12.4 fL Final     Platelets   Date Value Ref Range Status   06/02/2021 226 140 - 450 10*3/mm3 Final   12/30/2020 257 140 - 440 10*3/uL Final     Neutrophil Rel %   Date Value Ref Range Status   12/30/2020 38.5 (L) 45 - 80 % Final     Neutrophil %   Date Value Ref Range Status   06/02/2021 41.4 (L) 42.7 - 76.0 % Final     Lymphocyte Rel %   Date Value Ref Range Status   12/30/2020 47.0 15 - 50 % Final     Lymphocyte %   Date Value Ref Range Status   06/02/2021 46.9 (H) 19.6 - 45.3 % Final     Monocyte Rel %   Date Value Ref Range Status   12/30/2020 7.9 0 - 15 % Final     Monocyte %   Date Value Ref Range Status   06/02/2021 7.2 5.0 - 12.0 % Final     Eosinophil %   Date Value Ref Range Status   06/02/2021 3.7 0.3 - 6.2 % Final   12/30/2020 5.4 0 - 7 % Final     Basophil Rel %   Date Value Ref Range Status   12/30/2020 0.6 0 - 2 % Final     Basophil %   Date Value Ref Range Status   06/02/2021 0.4 0.0 - 1.5 % Final     Immature Grans %   Date Value Ref Range Status   06/02/2021 0.4 0.0 - 0.5 % Final   12/30/2020 0.6 0.0 - 1.0 % Final     Neutrophils Absolute   Date Value Ref Range Status   12/30/2020 2.01 2.0 - 8.8 10*3/uL Final     Neutrophils, Absolute   Date Value Ref Range Status   06/02/2021 3.05 1.70 - 7.00 10*3/mm3 Final     Lymphocytes Absolute   Date Value Ref Range Status   12/30/2020 2.45 0.7 - 5.5 10*3/uL Final     Lymphocytes, Absolute   Date Value Ref Range Status   06/02/2021 3.46 (H) 0.70 - 3.10 10*3/mm3 Final     Monocytes Absolute   Date Value Ref Range Status   12/30/2020 0.41 0.0 - 1.7 10*3/uL Final     Monocytes, Absolute   Date Value Ref Range Status   06/02/2021 0.53 0.10 - 0.90 10*3/mm3 Final     Eosinophils Absolute   Date Value Ref Range Status   12/30/2020 0.28 0.0 - 0.8 10*3/uL Final     Eosinophils, Absolute   Date Value Ref Range Status   06/02/2021 0.27 0.00 - 0.40 10*3/mm3 Final     Basophils Absolute   Date Value Ref Range Status   12/30/2020 0.03 0.0 - 0.2 10*3/uL Final      Basophils, Absolute   Date Value Ref Range Status   06/02/2021 0.03 0.00 - 0.20 10*3/mm3 Final     Immature Grans, Absolute   Date Value Ref Range Status   06/02/2021 0.03 0.00 - 0.05 10*3/mm3 Final   12/30/2020 0.03 0.00 - 0.10 10*3/uL Final     nRBC   Date Value Ref Range Status   06/02/2021 0.0 0.0 - 0.2 /100 WBC Final       No results found for: HGBA1C    Lab Results   Component Value Date    LWOPKVLH22 414 08/21/2020       TSH   Date Value Ref Range Status   08/21/2020 2.520 0.270 - 4.200 uIU/mL Final       No results found for: CHOL  Lab Results   Component Value Date    TRIG 777 (H) 11/13/2020     Lab Results   Component Value Date    HDL 35 (L) 11/13/2020     Lab Results   Component Value Date     (H) 11/13/2020     Lab Results   Component Value Date    VLDL 136 (H) 11/13/2020     No results found for: LDLHDL      Procedures    Assessment/Plan   Problems Addressed this Visit     Acute seasonal allergic rhinitis due to pollen    Relevant Medications    cetirizine (zyrTEC) 10 MG tablet    ST (sore throat)    Renal cyst - Primary    Relevant Orders    Ambulatory Referral to Nephrology    Fatty liver      Diagnoses       Codes Comments    Renal cyst    -  Primary ICD-10-CM: N28.1  ICD-9-CM: 753.10     ST (sore throat)     ICD-10-CM: J02.9  ICD-9-CM: 462     Acute seasonal allergic rhinitis due to pollen     ICD-10-CM: J30.1  ICD-9-CM: 477.0     Fatty liver     ICD-10-CM: K76.0  ICD-9-CM: 571.8           Orders Placed This Encounter   Procedures   • Ambulatory Referral to Nephrology     Referral Priority:   Routine     Referral Type:   Consultation     Referral Reason:   Specialty Services Required     Requested Specialty:   Nephrology     Number of Visits Requested:   1       Current Outpatient Medications   Medication Sig Dispense Refill   • aspirin-acetaminophen-caffeine (Excedrin Migraine) 250-250-65 MG per tablet Take 1 tablet by mouth Every 8 (Eight) Hours As Needed for Headache. 60 tablet 4   •  atorvastatin (LIPITOR) 20 MG tablet TAKE 1 TABLET BY MOUTH DAILY 30 tablet 3   • cetirizine (zyrTEC) 10 MG tablet Take 1 tablet by mouth Daily. 30 tablet 5   • dicyclomine (BENTYL) 20 MG tablet TAKE 1 TABLET BY MOUTH EVERY 6 HOURS 30 tablet 1   • fenofibrate 160 MG tablet Take 1 tablet by mouth Daily. 30 tablet 3   • HYDROcodone-acetaminophen (NORCO) 5-325 MG per tablet Take 1 tablet by mouth Every 4 (Four) Hours As Needed for Moderate Pain . 16 tablet 0   • Hydrocort-Pramoxine, Perianal, (Analpram HC) 2.5-1 % rectal cream Insert  into the rectum 2 (Two) Times a Day. 30 g 1   • hydrocortisone 1 % ointment Apply  topically to the appropriate area as directed 2 (Two) Times a Day. 28 g 1   • hyoscyamine (LEVSIN) 0.125 MG SL tablet Take 1 tablet by mouth Every 4 (Four) Hours As Needed for Cramping. 60 tablet 5   • ibuprofen (Advil) 200 MG tablet Take 1 tablet by mouth Every 6 (Six) Hours As Needed for Moderate Pain  or Fever. 60 tablet 3   • omeprazole (priLOSEC) 20 MG capsule      • ondansetron ODT (Zofran ODT) 8 MG disintegrating tablet Place 1 tablet on the tongue Every 8 (Eight) Hours As Needed for Nausea or Vomiting. 12 tablet 0   • pantoprazole (PROTONIX) 40 MG EC tablet Take 1 tablet by mouth 2 (two) times a day. 60 tablet 5   • sucralfate (CARAFATE) 1 g tablet DISSOLVE 1 TABLET IN 1 TABLESPOON OF WARM WATER UNTIL IT BECOMES A SLURRY, THEN DRINK. DO THIS FOUR TIMES DAILY, BEFORE MEALS AND AT BEDTIME. 120 tablet 2   • vitamin D (ERGOCALCIFEROL) 1.25 MG (28626 UT) capsule capsule Take 1 capsule by mouth 1 (One) Time Per Week. 12 capsule 0     No current facility-administered medications for this visit.       No follow-ups on file.    There are no Patient Instructions on file for this visit.        Refill zyrtec and discussed CT results  Fatty liver- will work on diet and exercise  Renal cyst- will get renal referral.       Pt consented to tele visit due to covid.  23 min spent on this visit

## 2021-07-17 DIAGNOSIS — E78.2 HYPERLIPEMIA, MIXED: ICD-10-CM

## 2021-07-19 RX ORDER — ATORVASTATIN CALCIUM 20 MG/1
20 TABLET, FILM COATED ORAL DAILY
Qty: 30 TABLET | Refills: 3 | Status: SHIPPED | OUTPATIENT
Start: 2021-07-19 | End: 2021-11-29

## 2021-09-27 ENCOUNTER — OFFICE VISIT (OUTPATIENT)
Dept: GASTROENTEROLOGY | Facility: CLINIC | Age: 46
End: 2021-09-27

## 2021-09-27 VITALS — TEMPERATURE: 97.5 F | HEIGHT: 65 IN | WEIGHT: 159.3 LBS | BODY MASS INDEX: 26.54 KG/M2

## 2021-09-27 DIAGNOSIS — R10.30 LOWER ABDOMINAL PAIN: Primary | ICD-10-CM

## 2021-09-27 PROCEDURE — 99214 OFFICE O/P EST MOD 30 MIN: CPT | Performed by: INTERNAL MEDICINE

## 2021-09-27 RX ORDER — DESIPRAMINE HYDROCHLORIDE 10 MG/1
10 TABLET ORAL 2 TIMES DAILY
Qty: 60 TABLET | Refills: 3 | Status: SHIPPED | OUTPATIENT
Start: 2021-09-27 | End: 2022-09-17

## 2021-09-27 NOTE — PROGRESS NOTES
Chief Complaint   Patient presents with   • Follow-up   • RUQ pain     Subjective     HPI  Smith Chisholm is a 46 y.o. male who presents today for follow up.  He continues with right upper quadrant pain, the only source I can find for this pain would be fatty liver disease.  CAT scan was otherwise unremarkable.  HIDA scan was 61%.  EGD was unremarkable    He has tried Levsin, Bentyl, PPI and Carafate with no relief of the pain  His liver tests are mildly elevated      Objective   Vitals:    09/27/21 1317   Temp: 97.5 °F (36.4 °C)       Physical Exam  Vitals reviewed.   Constitutional:       Appearance: He is well-developed.   HENT:      Head: Normocephalic and atraumatic.   Abdominal:      General: Bowel sounds are normal. There is no distension.      Palpations: Abdomen is soft. There is no mass.      Tenderness: There is no abdominal tenderness.      Hernia: No hernia is present.   Skin:     General: Skin is warm and dry.   Neurological:      Mental Status: He is alert and oriented to person, place, and time.   Psychiatric:         Behavior: Behavior normal.         Thought Content: Thought content normal.         Judgment: Judgment normal.       The following data was reviewed by: Francesco Chowdhury MD on 09/27/2021:  Common labs    Common Labsle 11/13/20 11/13/20 12/30/20 6/2/21 6/2/21    1025 1025  2204 2204   Glucose    113 (A)    Glucose  98      BUN  19  9    Creatinine  1.03  0.81    eGFR Non  Am  79  104    eGFR African Am  96  125    Sodium  135 (A)  136    Potassium  4.2  3.9    Chloride  99  101    Calcium  9.8  9.0    Total Protein  7.0      Albumin  4.20  4.10    Total Bilirubin  0.3  0.5    Alkaline Phosphatase  83  113    AST (SGOT)  15  46 (A)    ALT (SGPT)  19  47 (A)    WBC   5.21  7.37   Hemoglobin   13.3 (A)  13.7   Hematocrit   39.4 (A)  40.1   Platelets   257  226   Total Cholesterol 274 (A)       Triglycerides 777 (A)       HDL Cholesterol 35 (A)       LDL Cholesterol  103 (A)       (A)  Abnormal value       Comments are available for some flowsheets but are not being displayed.           CMP    CMP 11/13/20 6/2/21   Glucose  113 (A)   Glucose 98    BUN 19 9   Creatinine 1.03 0.81   eGFR Non  Am 79 104   eGFR African Am 96 125   Sodium 135 (A) 136   Potassium 4.2 3.9   Chloride 99 101   Calcium 9.8 9.0   Total Protein 7.0    Albumin 4.20 4.10   Globulin 2.8    Total Bilirubin 0.3 0.5   Alkaline Phosphatase 83 113   AST (SGOT) 15 46 (A)   ALT (SGPT) 19 47 (A)   (A) Abnormal value       Comments are available for some flowsheets but are not being displayed.           CBC    CBC 12/30/20 6/2/21   WBC 5.21 7.37   RBC 4.69 4.87   Hemoglobin 13.3 (A) 13.7   Hematocrit 39.4 (A) 40.1   MCV 84.0 82.3   MCH 28.4 28.1   MCHC 33.8 34.2   RDW 13.0 14.3   Platelets 257 226   (A) Abnormal value            CBC w/diff    CBC w/Diff 12/30/20 6/2/21   WBC 5.21 7.37   RBC 4.69 4.87   Hemoglobin 13.3 (A) 13.7   Hematocrit 39.4 (A) 40.1   MCV 84.0 82.3   MCH 28.4 28.1   MCHC 33.8 34.2   RDW 13.0 14.3   Platelets 257 226   Neutrophil Rel % 38.5 (A) 41.4 (A)   Immature Granulocyte Rel % 0.6 0.4   Lymphocyte Rel % 47.0 46.9 (A)   Monocyte Rel % 7.9 7.2   Eosinophil Rel % 5.4 3.7   Basophil Rel % 0.6 0.4   (A) Abnormal value            Lipid Panel    Lipid Panel 11/13/20   Total Cholesterol 274 (A)   Triglycerides 777 (A)   HDL Cholesterol 35 (A)   VLDL Cholesterol 136 (A)   LDL Cholesterol  103 (A)   (A) Abnormal value                Data reviewed: Radiologic studies CAT scan and HIDA scan reviewed and GI studies EGD colonoscopy reports reviewed     Assessment/Plan   Assessment:     Right upper quadrant pain  Hepatic steatosis  Mild elevation of the transaminases  Acute gastritis      Plan:     Schedule MRCP  Begin desipramine 10 mg p.o. twice daily for functional abdominal pain  If MRCP unrevealing consider referral to [facility] for suspected SOD versus gallbladder dyskinesia  If Dr. Mehta sees the  patient I would wonder if he wants to do biliary manometry or just refer him on to have his gallbladder removed  Lifestyle modifications for hepatic steatosis discussed in great detail, do I really think is mild steatosis is causing his right upper quadrant pain? it is doubtful but it is possible    I spent 45 minutes caring for Smith on this date of service. This time includes time spent by me in the following activities:preparing for the visit, reviewing tests, obtaining and/or reviewing a separately obtained history, performing a medically appropriate examination and/or evaluation , counseling and educating the patient/family/caregiver, ordering medications, tests, or procedures, referring and communicating with other health care professionals , documenting information in the medical record, independently interpreting results and communicating that information with the patient/family/caregiver and care coordination    Francesco Chowdhury MD  [facility]  [address] [place] [address]  Office: [phone]

## 2021-10-01 ENCOUNTER — TELEPHONE (OUTPATIENT)
Dept: FAMILY MEDICINE CLINIC | Facility: CLINIC | Age: 46
End: 2021-10-01

## 2021-10-01 RX ORDER — AMOXICILLIN 875 MG/1
875 TABLET, COATED ORAL 2 TIMES DAILY
Qty: 20 TABLET | Refills: 0 | Status: SHIPPED | OUTPATIENT
Start: 2021-10-01 | End: 2022-01-17

## 2021-10-01 NOTE — TELEPHONE ENCOUNTER
Sent an antibiotic.  Let patient know if symptoms worsen go to the ER or urgent care and may need to be tested for Covid.

## 2021-10-01 NOTE — TELEPHONE ENCOUNTER
Pt notified and verbalized understanding. States he has already tested for Covid and it was negative

## 2021-10-01 NOTE — TELEPHONE ENCOUNTER
Caller: Smith Chisholm    Relationship: Self    Best call back number: [phone]     What medication are you requesting: ANTI-BIOTIC    What are your current symptoms: SORE THROAT AND RUNNY NOSE    How long have you been experiencing symptoms: TWO DAYS    If a prescription is needed, what is your preferred pharmacy and phone number:  [facility] #[id number] [place], [place] - [address]  AT [facility] [phone] [facility] [phone]

## 2021-10-20 ENCOUNTER — HOSPITAL ENCOUNTER (OUTPATIENT)
Dept: MRI IMAGING | Facility: HOSPITAL | Age: 46
Discharge: HOME | End: 2021-10-20
Admitting: INTERNAL MEDICINE

## 2021-10-20 DIAGNOSIS — R10.30 LOWER ABDOMINAL PAIN: ICD-10-CM

## 2021-10-20 PROCEDURE — 74183 MRI ABD W/O CNTR FLWD CNTR: CPT

## 2021-10-20 PROCEDURE — A9577 INJ MULTIHANCE: HCPCS | Performed by: INTERNAL MEDICINE

## 2021-10-20 PROCEDURE — 0 GADOBENATE DIMEGLUMINE 529 MG/ML SOLUTION: Performed by: INTERNAL MEDICINE

## 2021-10-20 RX ADMIN — GADOBENATE DIMEGLUMINE 15 ML: 529 INJECTION, SOLUTION INTRAVENOUS at 07:37

## 2021-10-22 ENCOUNTER — TELEPHONE (OUTPATIENT)
Dept: FAMILY MEDICINE CLINIC | Facility: CLINIC | Age: 46
End: 2021-10-22

## 2021-10-22 ENCOUNTER — TELEPHONE (OUTPATIENT)
Dept: GASTROENTEROLOGY | Facility: CLINIC | Age: 46
End: 2021-10-22

## 2021-10-22 NOTE — TELEPHONE ENCOUNTER
Patient know he has a fatty liver.  Recommend working on diet and exercise to help with this.  Otherwise there is normal.  If patient has additional questions he can contact his doctor that order this.

## 2021-10-22 NOTE — TELEPHONE ENCOUNTER
Spoke with patient and informed that he should check with the ordering provider and he stated that he would like for Dr. Us to review the results

## 2021-10-22 NOTE — TELEPHONE ENCOUNTER
----- Message from Brenton Savage sent at 10/22/2021  2:10 PM EDT -----  Regarding: mri results  Contact: [phone]  Pt is calling for his mri results.

## 2021-10-22 NOTE — TELEPHONE ENCOUNTER
Caller: Smith Chisholm    Relationship: Self    Best call back number: [phone]    Caller requesting test results: PATIENT    What test was performed: MRI    When was the test performed: 10/21/21    Additional notes: PATIENT IS SEEKING RESULTS OF MRI. PLEASE ADVISE

## 2021-10-25 NOTE — TELEPHONE ENCOUNTER
MRI shows pancreatic divisum which is a congenital abnormality of the pancreas.  Normal pancreatic ducts.  Gallbladder and biliary tree normal.  He has a moderate amount of fatty liver disease.  Awaiting Dr. Chowdhury to sign off on MRI as well.

## 2021-10-27 DIAGNOSIS — K21.9 GERD WITHOUT ESOPHAGITIS: ICD-10-CM

## 2021-10-27 RX ORDER — PANTOPRAZOLE SODIUM 40 MG/1
TABLET, DELAYED RELEASE ORAL
Qty: 60 TABLET | Refills: 0 | Status: SHIPPED | OUTPATIENT
Start: 2021-10-27 | End: 2022-01-17

## 2021-10-27 NOTE — TELEPHONE ENCOUNTER
Rx Refill Note  Requested Prescriptions     Pending Prescriptions Disp Refills   • pantoprazole (PROTONIX) 40 MG EC tablet [Pharmacy Med Name: PANTOPRAZOLE 40MG TABLETS] 60 tablet 5     Sig: TAKE 1 TABLET BY MOUTH TWICE DAILY      Last office visit with prescribing clinician: 6/8/21      Next office visit with prescribing clinician: Visit date not found       {TIP  Please add Last Relevant Lab Date if appropriate:23}     Derrek Arenas MA  10/27/21, 08:21 EDT

## 2021-10-29 ENCOUNTER — TELEPHONE (OUTPATIENT)
Dept: GASTROENTEROLOGY | Facility: CLINIC | Age: 46
End: 2021-10-29

## 2021-10-29 NOTE — TELEPHONE ENCOUNTER
----- Message from Francesco Chowdhury MD sent at 10/29/2021 12:55 PM EDT -----  If MRCP unrevealing consider referral to [facility] for suspected SOD versus gallbladder dyskinesia    If Dr. Sellers sees the patient I would wonder if he wants to do biliary manometry or just refer him on to have his gallbladder removed  Lifestyle modifications for hepatic steatosis discussed in great detail, do I really think is mild steatosis is causing his right upper quadrant pain? it is doubtful but it is possible    PLEASE REFER TO DR SELLERS FOR NORMAL MRCP, thx

## 2021-10-29 NOTE — TELEPHONE ENCOUNTER
Referral harris been sent to ' office. Verfied with yarely morales fax was received and they will call the PT to schedule an OV.     Fax#[phone]

## 2021-10-29 NOTE — TELEPHONE ENCOUNTER
Patient called. Advised as per Dr. Chowdhury's note. He verb understanding and is in agreement with the plan. Update to Ann.

## 2021-11-27 DIAGNOSIS — E78.2 HYPERLIPEMIA, MIXED: ICD-10-CM

## 2021-11-29 RX ORDER — ATORVASTATIN CALCIUM 20 MG/1
20 TABLET, FILM COATED ORAL DAILY
Qty: 30 TABLET | Refills: 0 | Status: SHIPPED | OUTPATIENT
Start: 2021-11-29 | End: 2022-01-03 | Stop reason: SDUPTHER

## 2021-11-29 NOTE — TELEPHONE ENCOUNTER
Rx Refill Note  Requested Prescriptions     Pending Prescriptions Disp Refills   • atorvastatin (LIPITOR) 20 MG tablet [Pharmacy Med Name: ATORVASTATIN 20MG TABLETS] 30 tablet 3     Sig: TAKE 1 TABLET BY MOUTH DAILY      Last office visit with prescribing clinician: 6/8/2021      Next office visit with prescribing clinician: Visit date not found            Derrek Arenas MA  11/29/21, 07:11 EST

## 2021-12-11 LAB
HBA1C MFR BLD: 7.5 %
HBA1C MFR BLD: 7.5 %
HBA1C MFR BLD: 7.5 % (ref 4.8–5.6)

## 2022-01-03 DIAGNOSIS — E78.2 HYPERLIPEMIA, MIXED: ICD-10-CM

## 2022-01-03 RX ORDER — ATORVASTATIN CALCIUM 20 MG/1
20 TABLET, FILM COATED ORAL DAILY
Qty: 14 TABLET | Refills: 0 | Status: SHIPPED | OUTPATIENT
Start: 2022-01-03 | End: 2022-01-10 | Stop reason: SDUPTHER

## 2022-01-03 NOTE — TELEPHONE ENCOUNTER
Rx Refill Note  Requested Prescriptions     Pending Prescriptions Disp Refills   • atorvastatin (LIPITOR) 20 MG tablet 30 tablet 0     Sig: Take 1 tablet by mouth Daily.      Last office visit with prescribing clinician: 2/25/2021      Next office visit with prescribing clinician: Visit date not found            Derrek Arenas MA  01/03/22, 10:21 EST

## 2022-01-03 NOTE — TELEPHONE ENCOUNTER
Caller: Chisholm His    Relationship: Self    Best call back number: [phone]    Requested Prescriptions:   Requested Prescriptions     Pending Prescriptions Disp Refills   • atorvastatin (LIPITOR) 20 MG tablet 30 tablet 0     Sig: Take 1 tablet by mouth Daily.        Pharmacy where request should be sent:   [facility]  [address] [place] [address]  [phone]    Additional details provided by patient:     Does the patient have less than a 3 day supply:  [x] Yes  [] No    Brenton Gibson Rep   01/03/22 10:15 EST

## 2022-01-10 DIAGNOSIS — E78.2 HYPERLIPEMIA, MIXED: ICD-10-CM

## 2022-01-10 RX ORDER — ATORVASTATIN CALCIUM 20 MG/1
20 TABLET, FILM COATED ORAL DAILY
Qty: 30 TABLET | Refills: 0 | Status: SHIPPED | OUTPATIENT
Start: 2022-01-10 | End: 2022-01-17

## 2022-01-17 ENCOUNTER — OFFICE VISIT (OUTPATIENT)
Dept: FAMILY MEDICINE CLINIC | Facility: CLINIC | Age: 47
End: 2022-01-17

## 2022-01-17 VITALS
RESPIRATION RATE: 16 BRPM | HEIGHT: 65 IN | SYSTOLIC BLOOD PRESSURE: 112 MMHG | HEART RATE: 83 BPM | DIASTOLIC BLOOD PRESSURE: 70 MMHG | BODY MASS INDEX: 26.42 KG/M2 | TEMPERATURE: 98.7 F | OXYGEN SATURATION: 98 % | WEIGHT: 158.6 LBS

## 2022-01-17 DIAGNOSIS — R80.8 OTHER PROTEINURIA: ICD-10-CM

## 2022-01-17 DIAGNOSIS — E55.9 VITAMIN D DEFICIENCY: ICD-10-CM

## 2022-01-17 DIAGNOSIS — E78.2 HYPERLIPEMIA, MIXED: ICD-10-CM

## 2022-01-17 DIAGNOSIS — E78.1 HYPERTRIGLYCERIDEMIA: ICD-10-CM

## 2022-01-17 DIAGNOSIS — E11.9 TYPE 2 DIABETES MELLITUS WITHOUT COMPLICATION, WITHOUT LONG-TERM CURRENT USE OF INSULIN: Primary | ICD-10-CM

## 2022-01-17 PROCEDURE — 99214 OFFICE O/P EST MOD 30 MIN: CPT | Performed by: FAMILY MEDICINE

## 2022-01-17 RX ORDER — ATORVASTATIN CALCIUM 20 MG/1
20 TABLET, FILM COATED ORAL DAILY
Qty: 90 TABLET | Refills: 0 | Status: SHIPPED | OUTPATIENT
Start: 2022-01-17 | End: 2022-01-18 | Stop reason: SDUPTHER

## 2022-01-17 RX ORDER — LOSARTAN POTASSIUM 25 MG/1
25 TABLET ORAL DAILY
Start: 2022-01-17 | End: 2022-01-18 | Stop reason: SDUPTHER

## 2022-01-17 NOTE — TELEPHONE ENCOUNTER
Rx Refill Note  Requested Prescriptions     Pending Prescriptions Disp Refills   • atorvastatin (LIPITOR) 20 MG tablet [Pharmacy Med Name: ATORVASTATIN 20MG TABLETS] 14 tablet      Sig: TAKE 1 TABLET BY MOUTH DAILY      Last office visit with prescribing clinician: 1/17/2022      Next office visit with prescribing clinician: Visit date not found            Derrek Arenas MA  01/17/22, 09:05 EST

## 2022-01-17 NOTE — PROGRESS NOTES
"Chief Complaint  Hyperlipidemia    Subjective          Smith Chisholm presents to [facility] PRIMARY [facility]ARE  History of Present Illness  PT is here for refills, labs and  DM- no med and his a1c was 7.5 when checked last time by another doctor.  HLD- on med and does exercise.  He is taking atorvastatin but not fenofibrate.  Vit d deficiency- no med and needs labs.    Objective   Vital Signs:   /70 (BP Location: Right arm, Patient Position: Sitting, Cuff Size: Large Adult)   Pulse 83   Temp 98.7 °F (37.1 °C) (Temporal)   Resp 16   Ht 165.1 cm (65\")   Wt 71.9 kg (158 lb 9.6 oz)   SpO2 98%   BMI 26.39 kg/m²     Physical Exam  Vitals and nursing note reviewed.   Constitutional:       Appearance: Normal appearance. He is well-developed.   HENT:      Head: Normocephalic and atraumatic.   Cardiovascular:      Rate and Rhythm: Normal rate and regular rhythm.      Heart sounds: Normal heart sounds.   Pulmonary:      Effort: Pulmonary effort is normal. No respiratory distress.      Breath sounds: Normal breath sounds. No stridor.   Neurological:      General: No focal deficit present.      Mental Status: He is alert and oriented to person, place, and time.   Psychiatric:         Mood and Affect: Mood normal.         Behavior: Behavior normal.        Result Review :                 Assessment and Plan    Diagnoses and all orders for this visit:    1. Type 2 diabetes mellitus without complication, without long-term current use of insulin (HCC) (Primary)  -     Microalbumin / Creatinine Urine Ratio - Urine, Clean Catch  -     Hemoglobin A1c  -     Comprehensive Metabolic Panel    2. Hypertriglyceridemia  -     Comprehensive Metabolic Panel  -     Lipid Panel    3. Vitamin D deficiency  -     Vitamin D 25 Hydroxy    4. Other proteinuria    Other orders  -     losartan (Cozaar) 25 MG tablet; Take 1 tablet by mouth Daily.        Follow Up   Return in about 3 months (around 4/17/2022) for diabetes, " hyperlipidema.  Patient was given instructions and counseling regarding his condition or for health maintenance advice. Please see specific information pulled into the AVS if appropriate.     Refills and labs.

## 2022-01-18 DIAGNOSIS — E78.2 HYPERLIPEMIA, MIXED: ICD-10-CM

## 2022-01-18 DIAGNOSIS — E55.9 VITAMIN D DEFICIENCY: ICD-10-CM

## 2022-01-18 LAB
25(OH)D3+25(OH)D2 SERPL-MCNC: 11.4 NG/ML (ref 30–100)
ALBUMIN SERPL-MCNC: 4.1 G/DL (ref 4–5)
ALBUMIN/CREAT UR: 498 MG/G CREAT (ref 0–29)
ALBUMIN/GLOB SERPL: 1.5 {RATIO} (ref 1.2–2.2)
ALP SERPL-CCNC: 138 IU/L (ref 44–121)
ALT SERPL-CCNC: 28 IU/L (ref 0–44)
AST SERPL-CCNC: 18 IU/L (ref 0–40)
BILIRUB SERPL-MCNC: 0.6 MG/DL (ref 0–1.2)
BUN SERPL-MCNC: 15 MG/DL (ref 6–24)
BUN/CREAT SERPL: 16 (ref 9–20)
CALCIUM SERPL-MCNC: 9.4 MG/DL (ref 8.7–10.2)
CHLORIDE SERPL-SCNC: 102 MMOL/L (ref 96–106)
CHOLEST SERPL-MCNC: 234 MG/DL (ref 100–199)
CO2 SERPL-SCNC: 23 MMOL/L (ref 20–29)
CREAT SERPL-MCNC: 0.94 MG/DL (ref 0.76–1.27)
CREAT UR-MCNC: 187.7 MG/DL
GLOBULIN SER CALC-MCNC: 2.8 G/DL (ref 1.5–4.5)
GLUCOSE SERPL-MCNC: 123 MG/DL (ref 65–99)
HBA1C MFR BLD: 6.7 % (ref 4.8–5.6)
HDLC SERPL-MCNC: 30 MG/DL
LDLC SERPL CALC-MCNC: 92 MG/DL (ref 0–99)
MICROALBUMIN UR-MCNC: 935 UG/ML
POTASSIUM SERPL-SCNC: 4.1 MMOL/L (ref 3.5–5.2)
PROT SERPL-MCNC: 6.9 G/DL (ref 6–8.5)
SODIUM SERPL-SCNC: 138 MMOL/L (ref 134–144)
TRIGL SERPL-MCNC: 675 MG/DL (ref 0–149)
VLDLC SERPL CALC-MCNC: 112 MG/DL (ref 5–40)

## 2022-01-18 RX ORDER — ATORVASTATIN CALCIUM 20 MG/1
20 TABLET, FILM COATED ORAL DAILY
Qty: 90 TABLET | Refills: 0 | Status: SHIPPED | OUTPATIENT
Start: 2022-01-18 | End: 2022-03-03 | Stop reason: SDUPTHER

## 2022-01-18 RX ORDER — ATORVASTATIN CALCIUM 20 MG/1
TABLET, FILM COATED ORAL
Qty: 30 TABLET | OUTPATIENT
Start: 2022-01-18

## 2022-01-18 RX ORDER — LOSARTAN POTASSIUM 25 MG/1
25 TABLET ORAL DAILY
Qty: 90 TABLET | Refills: 0 | Status: SHIPPED | OUTPATIENT
Start: 2022-01-18 | End: 2022-09-17

## 2022-01-18 RX ORDER — ERGOCALCIFEROL 1.25 MG/1
50000 CAPSULE ORAL WEEKLY
Qty: 12 CAPSULE | Refills: 1 | Status: SHIPPED | OUTPATIENT
Start: 2022-01-18 | End: 2022-03-03 | Stop reason: SDUPTHER

## 2022-01-18 NOTE — TELEPHONE ENCOUNTER
Rx Refill Note  Requested Prescriptions     Pending Prescriptions Disp Refills   • atorvastatin (LIPITOR) 20 MG tablet [Pharmacy Med Name: ATORVASTATIN 20 MG TABLET] 30 tablet      Sig: TAKE 1 TABLET BY MOUTH EVERY DAY      Last office visit with prescribing clinician: 1/17/2022      Next office visit with prescribing clinician: Visit date not found            Derrek Arenas MA  01/18/22, 13:10 EST

## 2022-01-18 NOTE — TELEPHONE ENCOUNTER
Caller: Smith Chisholm    Relationship: Self    Best call back number: [phone]    Requested Prescriptions:   Requested Prescriptions     Pending Prescriptions Disp Refills   • atorvastatin (LIPITOR) 20 MG tablet 90 tablet 0     Sig: Take 1 tablet by mouth Daily.        Pharmacy where request should be sent: [facility]/PHARMACY #[id number] [place], [place] - [address] - [phone]  - [phone] FX     Additional details provided by patient: PATIENT STATES HE RECEIVED A CALL THAT 3 MEDICATIONS WERE SENT TO [facility]. WHEN HE WENT THEY HAD ONLY RECEIVED THE VITAMIN D. HE BELIEVES THERE IS A 2ND MEDICATION THAT ALSO NEEDS TO BE FILLED BUT UNSURE WHAT IT WAS CALLED.     Does the patient have less than a 3 day supply:  [x] Yes  [] No    Brenton Toussaint Rep   01/18/22 16:19 EST

## 2022-01-18 NOTE — TELEPHONE ENCOUNTER
Rx Refill Note  Requested Prescriptions     Pending Prescriptions Disp Refills   • atorvastatin (LIPITOR) 20 MG tablet 90 tablet 0     Sig: Take 1 tablet by mouth Daily.   • losartan (Cozaar) 25 MG tablet 90 tablet 0     Sig: Take 1 tablet by mouth Daily.      Last office visit with prescribing clinician: 1/17/2022      Next office visit with prescribing clinician: Visit date not found            Dererk Arenas MA  01/18/22, 16:33 EST

## 2022-02-26 ENCOUNTER — APPOINTMENT (OUTPATIENT)
Dept: CT IMAGING | Facility: HOSPITAL | Age: 47
End: 2022-02-26

## 2022-02-26 ENCOUNTER — HOSPITAL ENCOUNTER (EMERGENCY)
Facility: HOSPITAL | Age: 47
Discharge: HOME | End: 2022-02-26
Attending: EMERGENCY MEDICINE | Admitting: EMERGENCY MEDICINE

## 2022-02-26 VITALS
HEIGHT: 70 IN | SYSTOLIC BLOOD PRESSURE: 126 MMHG | DIASTOLIC BLOOD PRESSURE: 89 MMHG | WEIGHT: 170 LBS | HEART RATE: 80 BPM | OXYGEN SATURATION: 97 % | TEMPERATURE: 97.7 F | RESPIRATION RATE: 16 BRPM | BODY MASS INDEX: 24.34 KG/M2

## 2022-02-26 DIAGNOSIS — R10.9 ABDOMINAL PAIN, UNSPECIFIED ABDOMINAL LOCATION: Primary | ICD-10-CM

## 2022-02-26 DIAGNOSIS — Z86.39 HISTORY OF NON-INSULIN DEPENDENT DIABETES MELLITUS: ICD-10-CM

## 2022-02-26 LAB
ALBUMIN SERPL-MCNC: 4 G/DL (ref 3.5–5.2)
ALBUMIN/GLOB SERPL: 1.3 G/DL
ALP SERPL-CCNC: 121 U/L (ref 39–117)
ALT SERPL W P-5'-P-CCNC: 29 U/L (ref 1–41)
ANION GAP SERPL CALCULATED.3IONS-SCNC: 9.5 MMOL/L (ref 5–15)
AST SERPL-CCNC: 15 U/L (ref 1–40)
BACTERIA UR QL AUTO: NORMAL /HPF
BASOPHILS # BLD AUTO: 0.04 10*3/MM3 (ref 0–0.2)
BASOPHILS NFR BLD AUTO: 0.6 % (ref 0–1.5)
BILIRUB SERPL-MCNC: 0.5 MG/DL (ref 0–1.2)
BILIRUB UR QL STRIP: NEGATIVE
BUN SERPL-MCNC: 11 MG/DL (ref 6–20)
BUN/CREAT SERPL: 12.6 (ref 7–25)
CALCIUM SPEC-SCNC: 9.5 MG/DL (ref 8.6–10.5)
CHLORIDE SERPL-SCNC: 102 MMOL/L (ref 98–107)
CLARITY UR: CLEAR
CO2 SERPL-SCNC: 24.5 MMOL/L (ref 22–29)
COLOR UR: YELLOW
CREAT SERPL-MCNC: 0.87 MG/DL (ref 0.76–1.27)
DEPRECATED RDW RBC AUTO: 41.6 FL (ref 37–54)
EOSINOPHIL # BLD AUTO: 0.24 10*3/MM3 (ref 0–0.4)
EOSINOPHIL NFR BLD AUTO: 3.4 % (ref 0.3–6.2)
ERYTHROCYTE [DISTWIDTH] IN BLOOD BY AUTOMATED COUNT: 13.2 % (ref 12.3–15.4)
GFR SERPL CREATININE-BSD FRML MDRD: 115 ML/MIN/1.73
GFR SERPL CREATININE-BSD FRML MDRD: 95 ML/MIN/1.73
GLOBULIN UR ELPH-MCNC: 3 GM/DL
GLUCOSE SERPL-MCNC: 120 MG/DL (ref 65–99)
GLUCOSE UR STRIP-MCNC: NEGATIVE MG/DL
HCT VFR BLD AUTO: 42.5 % (ref 37.5–51)
HGB BLD-MCNC: 13.8 G/DL (ref 13–17.7)
HGB UR QL STRIP.AUTO: ABNORMAL
HYALINE CASTS UR QL AUTO: NORMAL /LPF
IMM GRANULOCYTES # BLD AUTO: 0.02 10*3/MM3 (ref 0–0.05)
IMM GRANULOCYTES NFR BLD AUTO: 0.3 % (ref 0–0.5)
KETONES UR QL STRIP: NEGATIVE
LEUKOCYTE ESTERASE UR QL STRIP.AUTO: NEGATIVE
LIPASE SERPL-CCNC: 43 U/L (ref 13–60)
LYMPHOCYTES # BLD AUTO: 3.42 10*3/MM3 (ref 0.7–3.1)
LYMPHOCYTES NFR BLD AUTO: 48.9 % (ref 19.6–45.3)
MCH RBC QN AUTO: 28.3 PG (ref 26.6–33)
MCHC RBC AUTO-ENTMCNC: 32.5 G/DL (ref 31.5–35.7)
MCV RBC AUTO: 87.3 FL (ref 79–97)
MONOCYTES # BLD AUTO: 0.45 10*3/MM3 (ref 0.1–0.9)
MONOCYTES NFR BLD AUTO: 6.4 % (ref 5–12)
NEUTROPHILS NFR BLD AUTO: 2.82 10*3/MM3 (ref 1.7–7)
NEUTROPHILS NFR BLD AUTO: 40.4 % (ref 42.7–76)
NITRITE UR QL STRIP: NEGATIVE
NRBC BLD AUTO-RTO: 0 /100 WBC (ref 0–0.2)
PH UR STRIP.AUTO: <=5 [PH] (ref 5–8)
PLATELET # BLD AUTO: 217 10*3/MM3 (ref 140–450)
PMV BLD AUTO: 9.7 FL (ref 6–12)
POTASSIUM SERPL-SCNC: 3.8 MMOL/L (ref 3.5–5.2)
PROT SERPL-MCNC: 7 G/DL (ref 6–8.5)
PROT UR QL STRIP: ABNORMAL
RBC # BLD AUTO: 4.87 10*6/MM3 (ref 4.14–5.8)
RBC # UR STRIP: NORMAL /HPF
REF LAB TEST METHOD: NORMAL
SODIUM SERPL-SCNC: 136 MMOL/L (ref 136–145)
SP GR UR STRIP: 1.01 (ref 1–1.03)
SQUAMOUS #/AREA URNS HPF: NORMAL /HPF
UROBILINOGEN UR QL STRIP: ABNORMAL
WBC # UR STRIP: NORMAL /HPF
WBC NRBC COR # BLD: 6.99 10*3/MM3 (ref 3.4–10.8)

## 2022-02-26 PROCEDURE — 81001 URINALYSIS AUTO W/SCOPE: CPT | Performed by: EMERGENCY MEDICINE

## 2022-02-26 PROCEDURE — 85025 COMPLETE CBC W/AUTO DIFF WBC: CPT | Performed by: EMERGENCY MEDICINE

## 2022-02-26 PROCEDURE — 96375 TX/PRO/DX INJ NEW DRUG ADDON: CPT

## 2022-02-26 PROCEDURE — 80053 COMPREHEN METABOLIC PANEL: CPT | Performed by: EMERGENCY MEDICINE

## 2022-02-26 PROCEDURE — 25010000002 HYDROMORPHONE 1 MG/ML SOLUTION: Performed by: EMERGENCY MEDICINE

## 2022-02-26 PROCEDURE — 99283 EMERGENCY DEPT VISIT LOW MDM: CPT

## 2022-02-26 PROCEDURE — 74177 CT ABD & PELVIS W/CONTRAST: CPT

## 2022-02-26 PROCEDURE — 25010000002 ONDANSETRON PER 1 MG: Performed by: EMERGENCY MEDICINE

## 2022-02-26 PROCEDURE — 36415 COLL VENOUS BLD VENIPUNCTURE: CPT

## 2022-02-26 PROCEDURE — 83690 ASSAY OF LIPASE: CPT | Performed by: EMERGENCY MEDICINE

## 2022-02-26 PROCEDURE — 25010000002 IOPAMIDOL 61 % SOLUTION: Performed by: EMERGENCY MEDICINE

## 2022-02-26 PROCEDURE — 96374 THER/PROPH/DIAG INJ IV PUSH: CPT

## 2022-02-26 RX ORDER — SODIUM CHLORIDE 0.9 % (FLUSH) 0.9 %
10 SYRINGE (ML) INJECTION AS NEEDED
Status: DISCONTINUED | OUTPATIENT
Start: 2022-02-26 | End: 2022-02-26 | Stop reason: HOSPADM

## 2022-02-26 RX ORDER — OMEPRAZOLE 40 MG/1
40 CAPSULE, DELAYED RELEASE ORAL 2 TIMES DAILY
Qty: 60 CAPSULE | Refills: 0 | Status: SHIPPED | OUTPATIENT
Start: 2022-02-26 | End: 2022-02-26 | Stop reason: SDUPTHER

## 2022-02-26 RX ORDER — OMEPRAZOLE 40 MG/1
40 CAPSULE, DELAYED RELEASE ORAL 2 TIMES DAILY
Qty: 60 CAPSULE | Refills: 0 | Status: SHIPPED | OUTPATIENT
Start: 2022-02-26 | End: 2022-09-22 | Stop reason: SDUPTHER

## 2022-02-26 RX ORDER — ONDANSETRON 2 MG/ML
4 INJECTION INTRAMUSCULAR; INTRAVENOUS ONCE
Status: COMPLETED | OUTPATIENT
Start: 2022-02-26 | End: 2022-02-26

## 2022-02-26 RX ADMIN — Medication 10 ML: at 19:07

## 2022-02-26 RX ADMIN — ONDANSETRON 4 MG: 2 INJECTION INTRAMUSCULAR; INTRAVENOUS at 19:06

## 2022-02-26 RX ADMIN — HYDROMORPHONE HYDROCHLORIDE 1 MG: 1 INJECTION, SOLUTION INTRAMUSCULAR; INTRAVENOUS; SUBCUTANEOUS at 19:06

## 2022-02-26 RX ADMIN — IOPAMIDOL 85 ML: 612 INJECTION, SOLUTION INTRAVENOUS at 19:47

## 2022-03-03 ENCOUNTER — OFFICE VISIT (OUTPATIENT)
Dept: FAMILY MEDICINE CLINIC | Facility: CLINIC | Age: 47
End: 2022-03-03

## 2022-03-03 VITALS
HEIGHT: 65 IN | WEIGHT: 160 LBS | BODY MASS INDEX: 26.66 KG/M2 | SYSTOLIC BLOOD PRESSURE: 100 MMHG | OXYGEN SATURATION: 98 % | RESPIRATION RATE: 14 BRPM | HEART RATE: 90 BPM | TEMPERATURE: 97.8 F | DIASTOLIC BLOOD PRESSURE: 72 MMHG

## 2022-03-03 DIAGNOSIS — E78.2 HYPERLIPEMIA, MIXED: ICD-10-CM

## 2022-03-03 DIAGNOSIS — G43.009 MIGRAINE WITHOUT AURA AND WITHOUT STATUS MIGRAINOSUS, NOT INTRACTABLE: ICD-10-CM

## 2022-03-03 DIAGNOSIS — R10.84 ABDOMINAL PAIN, GENERALIZED: ICD-10-CM

## 2022-03-03 DIAGNOSIS — K21.9 GERD WITHOUT ESOPHAGITIS: Primary | ICD-10-CM

## 2022-03-03 DIAGNOSIS — E55.9 VITAMIN D DEFICIENCY: ICD-10-CM

## 2022-03-03 PROCEDURE — 99214 OFFICE O/P EST MOD 30 MIN: CPT | Performed by: FAMILY MEDICINE

## 2022-03-03 RX ORDER — OMEPRAZOLE 40 MG/1
40 CAPSULE, DELAYED RELEASE ORAL DAILY
Qty: 30 CAPSULE | Refills: 3 | Status: SHIPPED | OUTPATIENT
Start: 2022-03-03 | End: 2022-09-17

## 2022-03-03 RX ORDER — ATORVASTATIN CALCIUM 20 MG/1
20 TABLET, FILM COATED ORAL DAILY
Qty: 90 TABLET | Refills: 0 | Status: SHIPPED | OUTPATIENT
Start: 2022-03-03 | End: 2022-08-09

## 2022-03-03 RX ORDER — ERGOCALCIFEROL 1.25 MG/1
50000 CAPSULE ORAL WEEKLY
Qty: 12 CAPSULE | Refills: 1 | Status: SHIPPED | OUTPATIENT
Start: 2022-03-03 | End: 2022-09-17

## 2022-03-03 NOTE — PROGRESS NOTES
"Chief Complaint  Flank Pain (hosp follow up)    Subjective          Smith Chisholm presents to [facility] PRIMARY [facility]  History of Present Illness  Pt is in for a ER follow up for abd pain again.  Currently no pain.  It comes and goes.  He had some nausea along with it.    He was given omeprazole but has not taken bc PA required.    HLD- need refills  Vit D deficiency need refills.        ER HPI:  Context: Yuriy Chisholm is a 47 y.o. male who presents to the ED c/o abdominal pain.  Patient reports aching right-sided abdominal pain ongoing since last Friday, roughly 1 week.  Pain is an aching in the right lateral abdomen.  It is worsened by nothing, improved by nothing.  He states is not worse with moving or with eating.  Pain is currently moderate to severe.  Patient's had some nausea but no vomiting or diarrhea.  He denies any dysuria or hematuria.  Patient denies history of similar pain in the past.  He does state he has had a prior appendectomy.  He does have a history of non-insulin-dependent diabetes and takes a medication that starts with a but he is not sure of its name.  Objective   Vital Signs:   /72 (BP Location: Left arm, Patient Position: Sitting, Cuff Size: Large Adult)   Pulse 90   Temp 97.8 °F (36.6 °C) (Temporal)   Resp 14   Ht 165.1 cm (65\")   Wt 72.6 kg (160 lb)   SpO2 98%   BMI 26.63 kg/m²     Physical Exam  Vitals and nursing note reviewed.   Cardiovascular:      Rate and Rhythm: Normal rate and regular rhythm.      Heart sounds: Normal heart sounds. No murmur heard.      Pulmonary:      Effort: Pulmonary effort is normal. No respiratory distress.      Breath sounds: Normal breath sounds. No stridor.   Abdominal:      General: Bowel sounds are normal.      Tenderness: There is no abdominal tenderness. There is no right CVA tenderness, left CVA tenderness, guarding or rebound.        Result Review :                 Assessment and Plan    Diagnoses and all orders for " this visit:    1. GERD without esophagitis (Primary)  -     omeprazole (priLOSEC) 40 MG capsule; Take 1 capsule by mouth Daily.  Dispense: 30 capsule; Refill: 3  -     Ambulatory Referral to Gastroenterology    2. Abdominal pain, generalized  -     omeprazole (priLOSEC) 40 MG capsule; Take 1 capsule by mouth Daily.  Dispense: 30 capsule; Refill: 3  -     Ambulatory Referral to Gastroenterology    3. Vitamin D deficiency  -     vitamin D (ERGOCALCIFEROL) 1.25 MG (26506 UT) capsule capsule; Take 1 capsule by mouth 1 (One) Time Per Week.  Dispense: 12 capsule; Refill: 1    4. Hyperlipemia, mixed  -     atorvastatin (LIPITOR) 20 MG tablet; Take 1 tablet by mouth Daily.  Dispense: 90 tablet; Refill: 0    5. Migraine without aura and without status migrainosus, not intractable  -     aspirin-acetaminophen-caffeine (Excedrin Migraine) 250-250-65 MG per tablet; Take 1 tablet by mouth 2 (Two) Times a Day As Needed for Headache.  Dispense: 60 tablet; Refill: 4        Follow Up   Return in about 3 months (around 6/3/2022) for diabetes.  Patient was given instructions and counseling regarding his condition or for health maintenance advice. Please see specific information pulled into the AVS if appropriate.   I have reviewed the following:  CT was normal., labs normal, UA normal.  ER note reviewed.    Start omeprazole qd.  SE discussed.      GI referral- second opinion.

## 2022-04-20 ENCOUNTER — TELEPHONE (OUTPATIENT)
Dept: FAMILY MEDICINE CLINIC | Facility: CLINIC | Age: 47
End: 2022-04-20

## 2022-04-20 NOTE — TELEPHONE ENCOUNTER
Caller: Yuriy Chisholm    Relationship: Self    Best call back number: [phone]      What is the best time to reach you: ANYTIME     Who are you requesting to speak with (clinical staff, provider,  specific staff member): CLINICAL    What was the call regarding: PATIENT STATES HE IS HAVING AN ITCHY SCALP ALL OVER.  HE STATES IN THE PAST DR. WHITAKER CALLED HIM IN A SHAMPOO TO USE FOR THIS PROBLEM.  PATIENT REQUESTING A MEDICATION PRESCRIPTION TO BE CALLED IN FOR THE ITCHY SCALP TO [facility] AT [address].      Do you require a callback: YES

## 2022-05-12 RX ORDER — KETOCONAZOLE 20 MG/ML
SHAMPOO TOPICAL 2 TIMES WEEKLY
Qty: 100 ML | Refills: 3 | Status: SHIPPED | OUTPATIENT
Start: 2022-05-12 | End: 2022-09-17

## 2022-07-15 DIAGNOSIS — J30.1 ACUTE SEASONAL ALLERGIC RHINITIS DUE TO POLLEN: ICD-10-CM

## 2022-07-18 RX ORDER — CETIRIZINE HYDROCHLORIDE 10 MG/1
10 TABLET ORAL DAILY
Qty: 30 TABLET | Refills: 5 | Status: SHIPPED | OUTPATIENT
Start: 2022-07-18 | End: 2022-09-17

## 2022-07-18 NOTE — TELEPHONE ENCOUNTER
Rx Refill Note  Requested Prescriptions     Pending Prescriptions Disp Refills   • cetirizine (zyrTEC) 10 MG tablet [Pharmacy Med Name: CETIRIZINE 10MG TABLETS] 30 tablet 5     Sig: TAKE 1 TABLET BY MOUTH DAILY      Last office visit with prescribing clinician: 3/3/2022      Next office visit with prescribing clinician: Visit date not found            Derrek Arenas, BERNIE/ELIZABETH  07/18/22, 06:57 EDT

## 2022-08-09 DIAGNOSIS — E78.2 HYPERLIPEMIA, MIXED: ICD-10-CM

## 2022-08-09 RX ORDER — ATORVASTATIN CALCIUM 20 MG/1
TABLET, FILM COATED ORAL
Qty: 30 TABLET | Refills: 0 | Status: SHIPPED | OUTPATIENT
Start: 2022-08-09 | End: 2022-09-14 | Stop reason: SDUPTHER

## 2022-08-09 NOTE — TELEPHONE ENCOUNTER
Rx Refill Note  Requested Prescriptions     Pending Prescriptions Disp Refills   • atorvastatin (LIPITOR) 20 MG tablet [Pharmacy Med Name: ATORVASTATIN 20 MG TABLET] 90 tablet 0     Sig: TAKE 1 TABLET BY MOUTH EVERY DAY      Last office visit with prescribing clinician: 3/3/2022      Next office visit with prescribing clinician: Visit date not found            Derrek Arenas CMA/ELIZABETH  08/09/22, 07:31 EDT

## 2022-09-14 DIAGNOSIS — E78.2 HYPERLIPEMIA, MIXED: ICD-10-CM

## 2022-09-14 RX ORDER — ATORVASTATIN CALCIUM 20 MG/1
20 TABLET, FILM COATED ORAL DAILY
Qty: 30 TABLET | Refills: 0 | Status: SHIPPED | OUTPATIENT
Start: 2022-09-14 | End: 2022-10-07

## 2022-09-14 NOTE — TELEPHONE ENCOUNTER
Caller: Smith Chisholm    Relationship: Self    Best call back number:[phone]     Requested Prescriptions:   Requested Prescriptions     Pending Prescriptions Disp Refills   • atorvastatin (LIPITOR) 20 MG tablet 30 tablet 0     Sig: Take 1 tablet by mouth Daily.        Pharmacy where request should be sent: [facility] #[id number] [place], [place] - [address] [facility] [phone] [facility] [phone]      Additional details provided by patient:     Does the patient have less than a 3 day supply:  [] Yes  [x] No    Brenton Peter Rep   09/14/22 16:13 EDT

## 2022-09-14 NOTE — TELEPHONE ENCOUNTER
Rx Refill Note  Requested Prescriptions     Pending Prescriptions Disp Refills   • atorvastatin (LIPITOR) 20 MG tablet 30 tablet 0     Sig: Take 1 tablet by mouth Daily.      Last office visit with prescribing clinician: 3/3/2022      Next office visit with prescribing clinician: Visit date not found

## 2022-09-17 ENCOUNTER — HOSPITAL ENCOUNTER (OUTPATIENT)
Facility: HOSPITAL | Age: 47
Setting detail: OBSERVATION
Discharge: HOME | End: 2022-09-19
Attending: EMERGENCY MEDICINE | Admitting: HOSPITALIST

## 2022-09-17 ENCOUNTER — APPOINTMENT (OUTPATIENT)
Dept: CT IMAGING | Facility: HOSPITAL | Age: 47
End: 2022-09-17

## 2022-09-17 DIAGNOSIS — F12.90 MARIJUANA USE: ICD-10-CM

## 2022-09-17 DIAGNOSIS — R11.0 NAUSEA: ICD-10-CM

## 2022-09-17 DIAGNOSIS — K56.600 PARTIAL SMALL BOWEL OBSTRUCTION: Primary | ICD-10-CM

## 2022-09-17 DIAGNOSIS — R73.9 HYPERGLYCEMIA: ICD-10-CM

## 2022-09-17 DIAGNOSIS — R10.13 EPIGASTRIC PAIN: ICD-10-CM

## 2022-09-17 LAB
ALBUMIN SERPL-MCNC: 3.7 G/DL (ref 3.5–5.2)
ALBUMIN/GLOB SERPL: 1.5 G/DL
ALP SERPL-CCNC: 96 U/L (ref 39–117)
ALT SERPL W P-5'-P-CCNC: 32 U/L (ref 1–41)
ANION GAP SERPL CALCULATED.3IONS-SCNC: 9 MMOL/L (ref 5–15)
AST SERPL-CCNC: 34 U/L (ref 1–40)
BACTERIA UR QL AUTO: NORMAL /HPF
BASOPHILS # BLD AUTO: 0.02 10*3/MM3 (ref 0–0.2)
BASOPHILS NFR BLD AUTO: 0.3 % (ref 0–1.5)
BILIRUB SERPL-MCNC: 0.4 MG/DL (ref 0–1.2)
BILIRUB UR QL STRIP: NEGATIVE
BUN SERPL-MCNC: 11 MG/DL (ref 6–20)
BUN/CREAT SERPL: 12 (ref 7–25)
CALCIUM SPEC-SCNC: 8.5 MG/DL (ref 8.6–10.5)
CHLORIDE SERPL-SCNC: 97 MMOL/L (ref 98–107)
CLARITY UR: CLEAR
CO2 SERPL-SCNC: 26 MMOL/L (ref 22–29)
COLOR UR: YELLOW
CREAT SERPL-MCNC: 0.92 MG/DL (ref 0.76–1.27)
DEPRECATED RDW RBC AUTO: 43.5 FL (ref 37–54)
EGFRCR SERPLBLD CKD-EPI 2021: 104.5 ML/MIN/1.73
EOSINOPHIL # BLD AUTO: 0.4 10*3/MM3 (ref 0–0.4)
EOSINOPHIL NFR BLD AUTO: 6.7 % (ref 0.3–6.2)
ERYTHROCYTE [DISTWIDTH] IN BLOOD BY AUTOMATED COUNT: 13.5 % (ref 12.3–15.4)
GLOBULIN UR ELPH-MCNC: 2.4 GM/DL
GLUCOSE SERPL-MCNC: 256 MG/DL (ref 65–99)
GLUCOSE UR STRIP-MCNC: ABNORMAL MG/DL
HBA1C MFR BLD: 7.7 % (ref 4.8–5.6)
HCT VFR BLD AUTO: 42.7 % (ref 37.5–51)
HGB BLD-MCNC: 13.7 G/DL (ref 13–17.7)
HGB UR QL STRIP.AUTO: ABNORMAL
HYALINE CASTS UR QL AUTO: NORMAL /LPF
IMM GRANULOCYTES # BLD AUTO: 0.03 10*3/MM3 (ref 0–0.05)
IMM GRANULOCYTES NFR BLD AUTO: 0.5 % (ref 0–0.5)
KETONES UR QL STRIP: NEGATIVE
LEUKOCYTE ESTERASE UR QL STRIP.AUTO: NEGATIVE
LIPASE SERPL-CCNC: 39 U/L (ref 13–60)
LYMPHOCYTES # BLD AUTO: 2.56 10*3/MM3 (ref 0.7–3.1)
LYMPHOCYTES NFR BLD AUTO: 42.6 % (ref 19.6–45.3)
MCH RBC QN AUTO: 28 PG (ref 26.6–33)
MCHC RBC AUTO-ENTMCNC: 32.1 G/DL (ref 31.5–35.7)
MCV RBC AUTO: 87.1 FL (ref 79–97)
MONOCYTES # BLD AUTO: 0.36 10*3/MM3 (ref 0.1–0.9)
MONOCYTES NFR BLD AUTO: 6 % (ref 5–12)
NEUTROPHILS NFR BLD AUTO: 2.64 10*3/MM3 (ref 1.7–7)
NEUTROPHILS NFR BLD AUTO: 43.9 % (ref 42.7–76)
NITRITE UR QL STRIP: NEGATIVE
NRBC BLD AUTO-RTO: 0 /100 WBC (ref 0–0.2)
PH UR STRIP.AUTO: 5.5 [PH] (ref 5–8)
PLATELET # BLD AUTO: 188 10*3/MM3 (ref 140–450)
PMV BLD AUTO: 9.8 FL (ref 6–12)
POTASSIUM SERPL-SCNC: 3.7 MMOL/L (ref 3.5–5.2)
PROT SERPL-MCNC: 6.1 G/DL (ref 6–8.5)
PROT UR QL STRIP: ABNORMAL
RBC # BLD AUTO: 4.9 10*6/MM3 (ref 4.14–5.8)
RBC # UR STRIP: NORMAL /HPF
REF LAB TEST METHOD: NORMAL
SODIUM SERPL-SCNC: 132 MMOL/L (ref 136–145)
SP GR UR STRIP: 1.01 (ref 1–1.03)
SQUAMOUS #/AREA URNS HPF: NORMAL /HPF
UROBILINOGEN UR QL STRIP: ABNORMAL
WBC # UR STRIP: NORMAL /HPF
WBC NRBC COR # BLD: 6.01 10*3/MM3 (ref 3.4–10.8)

## 2022-09-17 PROCEDURE — 83036 HEMOGLOBIN GLYCOSYLATED A1C: CPT | Performed by: EMERGENCY MEDICINE

## 2022-09-17 PROCEDURE — 25010000002 METOCLOPRAMIDE PER 10 MG: Performed by: EMERGENCY MEDICINE

## 2022-09-17 PROCEDURE — 96361 HYDRATE IV INFUSION ADD-ON: CPT

## 2022-09-17 PROCEDURE — 25010000002 MORPHINE PER 10 MG: Performed by: NURSE PRACTITIONER

## 2022-09-17 PROCEDURE — 96375 TX/PRO/DX INJ NEW DRUG ADDON: CPT

## 2022-09-17 PROCEDURE — 96374 THER/PROPH/DIAG INJ IV PUSH: CPT

## 2022-09-17 PROCEDURE — 25010000002 MORPHINE PER 10 MG: Performed by: EMERGENCY MEDICINE

## 2022-09-17 PROCEDURE — 81001 URINALYSIS AUTO W/SCOPE: CPT | Performed by: EMERGENCY MEDICINE

## 2022-09-17 PROCEDURE — 25010000002 DIPHENHYDRAMINE PER 50 MG: Performed by: EMERGENCY MEDICINE

## 2022-09-17 PROCEDURE — 83690 ASSAY OF LIPASE: CPT | Performed by: EMERGENCY MEDICINE

## 2022-09-17 PROCEDURE — 96376 TX/PRO/DX INJ SAME DRUG ADON: CPT

## 2022-09-17 PROCEDURE — 25010000002 ONDANSETRON PER 1 MG: Performed by: EMERGENCY MEDICINE

## 2022-09-17 PROCEDURE — G0378 HOSPITAL OBSERVATION PER HR: HCPCS

## 2022-09-17 PROCEDURE — 80053 COMPREHEN METABOLIC PANEL: CPT | Performed by: EMERGENCY MEDICINE

## 2022-09-17 PROCEDURE — 25010000002 ONDANSETRON PER 1 MG: Performed by: INTERNAL MEDICINE

## 2022-09-17 PROCEDURE — 25010000002 IOPAMIDOL 61 % SOLUTION: Performed by: EMERGENCY MEDICINE

## 2022-09-17 PROCEDURE — 99284 EMERGENCY DEPT VISIT MOD MDM: CPT

## 2022-09-17 PROCEDURE — 85025 COMPLETE CBC W/AUTO DIFF WBC: CPT | Performed by: EMERGENCY MEDICINE

## 2022-09-17 PROCEDURE — 99203 OFFICE O/P NEW LOW 30 MIN: CPT | Performed by: SURGERY

## 2022-09-17 PROCEDURE — 74177 CT ABD & PELVIS W/CONTRAST: CPT

## 2022-09-17 RX ORDER — DIPHENHYDRAMINE HYDROCHLORIDE 50 MG/ML
25 INJECTION INTRAMUSCULAR; INTRAVENOUS ONCE
Status: COMPLETED | OUTPATIENT
Start: 2022-09-17 | End: 2022-09-17

## 2022-09-17 RX ORDER — NICOTINE POLACRILEX 4 MG
15 LOZENGE BUCCAL
Status: DISCONTINUED | OUTPATIENT
Start: 2022-09-17 | End: 2022-09-19 | Stop reason: HOSPADM

## 2022-09-17 RX ORDER — PANTOPRAZOLE SODIUM 40 MG/10ML
80 INJECTION, POWDER, LYOPHILIZED, FOR SOLUTION INTRAVENOUS ONCE
Status: COMPLETED | OUTPATIENT
Start: 2022-09-17 | End: 2022-09-17

## 2022-09-17 RX ORDER — MORPHINE SULFATE 2 MG/ML
4 INJECTION, SOLUTION INTRAMUSCULAR; INTRAVENOUS ONCE
Status: COMPLETED | OUTPATIENT
Start: 2022-09-17 | End: 2022-09-17

## 2022-09-17 RX ORDER — MORPHINE SULFATE 2 MG/ML
4 INJECTION, SOLUTION INTRAMUSCULAR; INTRAVENOUS EVERY 4 HOURS PRN
Status: DISCONTINUED | OUTPATIENT
Start: 2022-09-17 | End: 2022-09-18

## 2022-09-17 RX ORDER — ONDANSETRON 2 MG/ML
4 INJECTION INTRAMUSCULAR; INTRAVENOUS EVERY 6 HOURS PRN
Status: DISCONTINUED | OUTPATIENT
Start: 2022-09-17 | End: 2022-09-19 | Stop reason: HOSPADM

## 2022-09-17 RX ORDER — ACETAMINOPHEN 650 MG/1
650 SUPPOSITORY RECTAL EVERY 4 HOURS PRN
Status: DISCONTINUED | OUTPATIENT
Start: 2022-09-17 | End: 2022-09-19 | Stop reason: HOSPADM

## 2022-09-17 RX ORDER — METOCLOPRAMIDE HYDROCHLORIDE 5 MG/ML
10 INJECTION INTRAMUSCULAR; INTRAVENOUS ONCE
Status: COMPLETED | OUTPATIENT
Start: 2022-09-17 | End: 2022-09-17

## 2022-09-17 RX ORDER — ATORVASTATIN CALCIUM 20 MG/1
20 TABLET, FILM COATED ORAL DAILY
Status: DISCONTINUED | OUTPATIENT
Start: 2022-09-18 | End: 2022-09-19 | Stop reason: HOSPADM

## 2022-09-17 RX ORDER — SODIUM CHLORIDE 9 MG/ML
125 INJECTION, SOLUTION INTRAVENOUS CONTINUOUS
Status: DISCONTINUED | OUTPATIENT
Start: 2022-09-17 | End: 2022-09-19

## 2022-09-17 RX ORDER — DEXTROSE MONOHYDRATE 25 G/50ML
25 INJECTION, SOLUTION INTRAVENOUS
Status: DISCONTINUED | OUTPATIENT
Start: 2022-09-17 | End: 2022-09-19 | Stop reason: HOSPADM

## 2022-09-17 RX ORDER — ACETAMINOPHEN 160 MG/5ML
650 SOLUTION ORAL EVERY 4 HOURS PRN
Status: DISCONTINUED | OUTPATIENT
Start: 2022-09-17 | End: 2022-09-19 | Stop reason: HOSPADM

## 2022-09-17 RX ORDER — ACETAMINOPHEN 325 MG/1
650 TABLET ORAL EVERY 4 HOURS PRN
Status: DISCONTINUED | OUTPATIENT
Start: 2022-09-17 | End: 2022-09-19 | Stop reason: HOSPADM

## 2022-09-17 RX ORDER — INSULIN LISPRO 100 [IU]/ML
0-9 INJECTION, SOLUTION INTRAVENOUS; SUBCUTANEOUS
Status: DISCONTINUED | OUTPATIENT
Start: 2022-09-18 | End: 2022-09-19 | Stop reason: HOSPADM

## 2022-09-17 RX ORDER — SODIUM CHLORIDE 0.9 % (FLUSH) 0.9 %
10 SYRINGE (ML) INJECTION AS NEEDED
Status: DISCONTINUED | OUTPATIENT
Start: 2022-09-17 | End: 2022-09-18

## 2022-09-17 RX ORDER — ONDANSETRON 2 MG/ML
4 INJECTION INTRAMUSCULAR; INTRAVENOUS ONCE
Status: COMPLETED | OUTPATIENT
Start: 2022-09-17 | End: 2022-09-17

## 2022-09-17 RX ORDER — MORPHINE SULFATE 2 MG/ML
2 INJECTION, SOLUTION INTRAMUSCULAR; INTRAVENOUS
Status: DISCONTINUED | OUTPATIENT
Start: 2022-09-17 | End: 2022-09-19 | Stop reason: HOSPADM

## 2022-09-17 RX ADMIN — METOCLOPRAMIDE 10 MG: 5 INJECTION, SOLUTION INTRAMUSCULAR; INTRAVENOUS at 15:04

## 2022-09-17 RX ADMIN — ACETAMINOPHEN 650 MG: 325 TABLET, FILM COATED ORAL at 19:30

## 2022-09-17 RX ADMIN — ONDANSETRON 4 MG: 2 INJECTION INTRAMUSCULAR; INTRAVENOUS at 12:19

## 2022-09-17 RX ADMIN — MORPHINE SULFATE 4 MG: 2 INJECTION, SOLUTION INTRAMUSCULAR; INTRAVENOUS at 12:19

## 2022-09-17 RX ADMIN — MORPHINE SULFATE 2 MG: 2 INJECTION, SOLUTION INTRAMUSCULAR; INTRAVENOUS at 20:57

## 2022-09-17 RX ADMIN — ONDANSETRON 4 MG: 2 INJECTION INTRAMUSCULAR; INTRAVENOUS at 20:56

## 2022-09-17 RX ADMIN — DIPHENHYDRAMINE HYDROCHLORIDE 25 MG: 50 INJECTION INTRAMUSCULAR; INTRAVENOUS at 15:04

## 2022-09-17 RX ADMIN — IOPAMIDOL 85 ML: 612 INJECTION, SOLUTION INTRAVENOUS at 13:56

## 2022-09-17 RX ADMIN — SODIUM CHLORIDE 125 ML/HR: 9 INJECTION, SOLUTION INTRAVENOUS at 17:36

## 2022-09-17 RX ADMIN — PANTOPRAZOLE SODIUM 80 MG: 40 INJECTION, POWDER, FOR SOLUTION INTRAVENOUS at 16:38

## 2022-09-17 NOTE — ED PROVIDER NOTES
EMERGENCY DEPARTMENT ENCOUNTER    Room Number:  31/31  Date seen:  9/17/2022  PCP: Roseann Us MD  Historian: Patient      HPI:  Chief Complaint: Abdominal pain  A complete HPI/ROS/PMH/PSH/SH/FH are unobtainable due to: Nothing  Context: Smith Chisholm is a 47 y.o. male who presents to the ED c/o 3 days of upper abdominal pain.  The pain is localized to the right side of the abdomen.  He has had some associated nausea but no vomiting.  The pain became more intense today.  He denies fever or chills.  He has had regular bowel movements.  His last bowel movement was around 3:00 this morning.  He denies black or bloody stool.  No fever or chills.  He denies chest pain or shortness of air.  The nurse triage note mentions right-sided neck pain but patient reports that he has had no neck pain.  He has had previous appendectomy.          PAST MEDICAL HISTORY  Active Ambulatory Problems     Diagnosis Date Noted   • Nephrolithiasis    • Hypertriglyceridemia    • Eczema    • Acute seasonal allergic rhinitis due to pollen    • Bilateral chronic serous otitis media 11/11/2019   • Generalized abdominal pain 12/06/2019   • Primary insomnia 01/28/2020   • Migraine without aura and without status migrainosus, not intractable 06/26/2020   • Chronic gastric ulcer without hemorrhage and without perforation 07/13/2020   • Vitamin D deficiency 08/24/2020   • Lower abdominal pain 10/09/2020   • ST (sore throat) 11/05/2020   • GERD without esophagitis 11/19/2020   • Renal cyst 06/08/2021   • Fatty liver 06/08/2021   • Type 2 diabetes mellitus without complication, without long-term current use of insulin (HCC) 01/17/2022   • Other proteinuria 01/17/2022     Resolved Ambulatory Problems     Diagnosis Date Noted   • No Resolved Ambulatory Problems     Past Medical History:   Diagnosis Date   • Abdominal pain    • Paresthesia of left leg          PAST SURGICAL HISTORY  Past Surgical History:   Procedure Laterality Date   • APPENDECTOMY      • COLONOSCOPY N/A 1/14/2021    Procedure: COLONOSCOPY to cecum and TI with cold polypectomy;  Surgeon: Francesco Chowdhury MD;  Location: [facility] ENDOSCOPY;  Service: Gastroenterology;  Laterality: N/A;  pre: abdominal pain and rectal bleeding  post: internal hemorrhoids, normal TI, polyp   • ENDOSCOPY N/A 1/14/2021    Procedure: ESOPHAGOGASTRODUODENOSCOPY with cold biopsies;  Surgeon: Francesco Chowdhury MD;  Location: [facility] ENDOSCOPY;  Service: Gastroenterology;  Laterality: N/A;  pre: h/o ulcers  post: ulcers healed, gastritis         FAMILY HISTORY  Family History   Problem Relation Age of Onset   • No Known Problems Mother          SOCIAL HISTORY  Social History     Socioeconomic History   • Marital status:    Tobacco Use   • Smoking status: Never Smoker   • Smokeless tobacco: Never Used   Substance and Sexual Activity   • Alcohol use: No   • Drug use: No   • Sexual activity: Defer         ALLERGIES  Patient has no known allergies.        REVIEW OF SYSTEMS  Review of Systems   Review of all 14 systems is negative other than stated in the HPI above.      PHYSICAL EXAM  ED Triage Vitals   Temp Heart Rate Resp BP SpO2   09/17/22 1111 09/17/22 1111 09/17/22 1111 09/17/22 1137 09/17/22 1111   98 °F (36.7 °C) 101 16 132/84 97 %      Temp src Heart Rate Source Patient Position BP Location FiO2 (%)   09/17/22 1111 09/17/22 1111 -- -- --   Tympanic Monitor            GENERAL: Awake and alert, no acute distress  HENT: nares patent  EYES: no scleral icterus, EOMI, pupils 3 mm reactive bilaterally  CV: regular rhythm, normal rate  RESPIRATORY: normal effort, lungs clear auscultation bilaterally  ABDOMEN: soft, mild right upper quadrant tenderness without rebound or guarding, no right lower quadrant tenderness.  Abdomen is nondistended.  MUSCULOSKELETAL: no deformity  NEURO: alert, moves all extremities, follows commands, cranial nerves II through XII grossly intact, speech fluent and clear  PSYCH:  calm,  cooperative  SKIN: warm, dry    Vital signs and nursing notes reviewed.          LAB RESULTS  Recent Results (from the past 24 hour(s))   Comprehensive Metabolic Panel    Collection Time: 09/17/22 12:19 PM    Specimen: Blood   Result Value Ref Range    Glucose 256 (H) 65 - 99 mg/dL    BUN 11 6 - 20 mg/dL    Creatinine 0.92 0.76 - 1.27 mg/dL    Sodium 132 (L) 136 - 145 mmol/L    Potassium 3.7 3.5 - 5.2 mmol/L    Chloride 97 (L) 98 - 107 mmol/L    CO2 26.0 22.0 - 29.0 mmol/L    Calcium 8.5 (L) 8.6 - 10.5 mg/dL    Total Protein 6.1 6.0 - 8.5 g/dL    Albumin 3.70 3.50 - 5.20 g/dL    ALT (SGPT) 32 1 - 41 U/L    AST (SGOT) 34 1 - 40 U/L    Alkaline Phosphatase 96 39 - 117 U/L    Total Bilirubin 0.4 0.0 - 1.2 mg/dL    Globulin 2.4 gm/dL    A/G Ratio 1.5 g/dL    BUN/Creatinine Ratio 12.0 7.0 - 25.0    Anion Gap 9.0 5.0 - 15.0 mmol/L    eGFR 104.5 >60.0 mL/min/1.73   Lipase    Collection Time: 09/17/22 12:19 PM    Specimen: Blood   Result Value Ref Range    Lipase 39 13 - 60 U/L   CBC Auto Differential    Collection Time: 09/17/22 12:19 PM    Specimen: Blood   Result Value Ref Range    WBC 6.01 3.40 - 10.80 10*3/mm3    RBC 4.90 4.14 - 5.80 10*6/mm3    Hemoglobin 13.7 13.0 - 17.7 g/dL    Hematocrit 42.7 37.5 - 51.0 %    MCV 87.1 79.0 - 97.0 fL    MCH 28.0 26.6 - 33.0 pg    MCHC 32.1 31.5 - 35.7 g/dL    RDW 13.5 12.3 - 15.4 %    RDW-SD 43.5 37.0 - 54.0 fl    MPV 9.8 6.0 - 12.0 fL    Platelets 188 140 - 450 10*3/mm3    Neutrophil % 43.9 42.7 - 76.0 %    Lymphocyte % 42.6 19.6 - 45.3 %    Monocyte % 6.0 5.0 - 12.0 %    Eosinophil % 6.7 (H) 0.3 - 6.2 %    Basophil % 0.3 0.0 - 1.5 %    Immature Grans % 0.5 0.0 - 0.5 %    Neutrophils, Absolute 2.64 1.70 - 7.00 10*3/mm3    Lymphocytes, Absolute 2.56 0.70 - 3.10 10*3/mm3    Monocytes, Absolute 0.36 0.10 - 0.90 10*3/mm3    Eosinophils, Absolute 0.40 0.00 - 0.40 10*3/mm3    Basophils, Absolute 0.02 0.00 - 0.20 10*3/mm3    Immature Grans, Absolute 0.03 0.00 - 0.05 10*3/mm3    nRBC 0.0  0.0 - 0.2 /100 WBC   Urinalysis With Microscopic If Indicated (No Culture) - Urine, Clean Catch    Collection Time: 09/17/22 12:20 PM    Specimen: Urine, Clean Catch   Result Value Ref Range    Color, UA Yellow Yellow, Straw    Appearance, UA Clear Clear    pH, UA 5.5 5.0 - 8.0    Specific Gravity, UA 1.011 1.005 - 1.030    Glucose,  mg/dL (2+) (A) Negative    Ketones, UA Negative Negative    Bilirubin, UA Negative Negative    Blood, UA Small (1+) (A) Negative    Protein,  mg/dL (2+) (A) Negative    Leuk Esterase, UA Negative Negative    Nitrite, UA Negative Negative    Urobilinogen, UA 0.2 E.U./dL 0.2 - 1.0 E.U./dL   Urinalysis, Microscopic Only - Urine, Clean Catch    Collection Time: 09/17/22 12:20 PM    Specimen: Urine, Clean Catch   Result Value Ref Range    RBC, UA 0-2 None Seen, 0-2 /HPF    WBC, UA 0-2 None Seen, 0-2 /HPF    Bacteria, UA None Seen None Seen /HPF    Squamous Epithelial Cells, UA 0-2 None Seen, 0-2 /HPF    Hyaline Casts, UA 0-2 None Seen /LPF    Methodology Automated Microscopy        Ordered the above labs and reviewed the results.        RADIOLOGY  CT Abdomen Pelvis With Contrast    Result Date: 9/17/2022  CT SCAN OF THE ABDOMEN AND PELVIS WITH INTRAVENOUS CONTRAST  HISTORY: Right upper quadrant pain which is worsening.  FINDINGS:  The CT scan was performed as an emergency procedure through the abdomen and pelvis with intravenous contrast. It is compared to previous CT scan of the abdomen dated 05/27/2021 and MRI scan of the abdomen dated 10/20/2021. The following findings are present: 1. The lung bases are clear. There is diffuse fatty infiltration of the liver similar to 05/27/2021. The spleen, pancreas, and both adrenal glands are unremarkable. There are multiple small bilateral renal cysts and no evidence of renal obstruction. The gallbladder appears normal. 2. There is no aortic aneurysm or retroperitoneal lymphadenopathy. There is a moderate amount of gastric contents within  the stomach and there are also multiple fluid-filled proximal small bowel loops measuring up to 2.9 cm with decompressed small bowel distally. The findings are nonspecific but could relate to early changes of partial small bowel obstruction. No abnormality is seen in the pelvis.  Radiation dose reduction techniques were utilized, including automated exposure control and exposure modulation based on body size.  This report was finalized on 9/17/2022 4:11 PM by Dr. Samm Gallardo M.D.        Ordered the above noted radiological studies. Reviewed by me in PACS.            PROCEDURES  Procedures              MEDICATIONS GIVEN IN ER  Medications   sodium chloride 0.9 % flush 10 mL (has no administration in time range)   sodium chloride 0.9 % infusion (125 mL/hr Intravenous New Bag 9/17/22 1736)   morphine injection 4 mg (4 mg Intravenous Given 9/17/22 1219)   ondansetron (ZOFRAN) injection 4 mg (4 mg Intravenous Given 9/17/22 1219)   iopamidol (ISOVUE-300) 61 % injection 100 mL (85 mL Intravenous Given by Other 9/17/22 1356)   metoclopramide (REGLAN) injection 10 mg (10 mg Intravenous Given 9/17/22 1504)   diphenhydrAMINE (BENADRYL) injection 25 mg (25 mg Intravenous Given 9/17/22 1504)   pantoprazole (PROTONIX) injection 80 mg (80 mg Intravenous Given 9/17/22 1638)                   MEDICAL DECISION MAKING, PROGRESS, and CONSULTS    All labs have been independently reviewed by me.  All radiology studies have been reviewed by me and discussed with radiologist dictating the report.   EKG's independently viewed and interpreted by me.  Discussion below represents my analysis of pertinent findings related to patient's condition, differential diagnosis, treatment plan and final disposition.      Differential diagnosis:  My differential diagnosis for abdominal pain includes but is not limited to:  Gastritis, gastroenteritis, peptic ulcer disease, GERD, esophageal perforation, acute appendicitis, mesenteric adenitis, Meckel’s  diverticulum, epiploic appendagitis, diverticulitis, colon cancer, ulcerative colitis, Crohn’s disease, intussusception, small bowel obstruction, adhesions, ischemic bowel, perforated viscus, ileus, obstipation, biliary colic, cholecystitis, cholelithiasis, Mario-[place], hepatitis, pancreatitis, common bile duct obstruction, cholangitis, bile leak, splenic trauma, splenic rupture, splenic infarction, splenic abscess, abdominal abscess, ascites, spontaneous bacterial peritonitis, hernia, UTI, cystitis, prostatitis, ureterolithiasis, urinary obstruction, ovarian cyst, torsion, pregnancy, ectopic pregnancy, PID, pelvic abscess, mittelschmerz, endometriosis, AAA, myocardial infarction, pneumonia, cancer, porphyria, DKA, medications, sickle cell, viral syndrome, zoster      ED Course as of 09/17/22 1801   Sat Sep 17, 2022   1339 WBC, UA: 0-2 [JR]   1339 Glucose(!): 256 [JR]   1339 Sodium(!): 132 [JR]   1339 WBC: 6.01 [JR]   1339 Lipase: 39 [JR]   1414 I have independently reviewed the CT abdomen.  Appears to be distention of the stomach with fluid without evidence of small bowel dilatation.  I question if he may have a component of gastroparesis.  I have ordered Reglan. [JR]   1557 Patient informed of CT and laboratory results.  He is now being p.o. challenged with ginger ale. [JR]   1610 Patient reassessed.  He was able to drink some ginger ale but his abdominal pain has intensified.  I discussed with him possibility of trying to go home with close return precautions if he develops vomiting versus admission for observation.  Patient was very indecisive so I ultimately recommended admission for observation.  I explained to him that I do not think that he will warrant surgical intervention based on his current CT findings.  I have placed a call to general surgery to consult. [JR]   1625 Discussed with Dr. Rea, general surgery, who agrees to consult. [JR]   1646 Patient is hyperglycemic today.  Do not see a prior  diagnosis of diabetes present.  I have added a hemoglobin A1c. [JR]   1730 Discussed with Dr. Mccurdy [facility], who agrees to admit. [JR]      ED Course User Index  [JR] Dakota Rowland MD            I wore an N95 mask, face shield, and gloves during this patient encounter.  Patient also wearing a surgical mask.  Hand hygeine performed before and after seeing the patient.    DIAGNOSIS  Final diagnoses:   Partial small bowel obstruction (HCC)   Epigastric pain   Nausea   Marijuana use   Hyperglycemia         DISPOSITION  ADMIT            Latest Documented Vital Signs:  As of 18:01 EDT  BP- 119/79 HR- 78 Temp- 98 °F (36.7 °C) (Tympanic) O2 sat- 97%        --    Please note that portions of this were completed with a voice recognition program.          Dakota Rowland MD  09/17/22 [id number]

## 2022-09-17 NOTE — PROGRESS NOTES
Clinical Pharmacy Services: Medication History    Smith Chisholm is a 47 y.o. male presenting to [facility] for   Chief Complaint   Patient presents with   • Abdominal Pain   • right side pain       He  has a past medical history of Abdominal pain, Acute seasonal allergic rhinitis due to pollen, Eczema, Hypertriglyceridemia, Nephrolithiasis, and Paresthesia of left leg.    Allergies as of 09/17/2022   • (No Known Allergies)       Medication information was obtained from: Patient  Pharmacy and Phone Number:     Prior to Admission Medications     Prescriptions Last Dose Informant Patient Reported? Taking?    atorvastatin (LIPITOR) 20 MG tablet  Self No Yes    Take 1 tablet by mouth Daily.            Medication notes:     This medication list is complete to the best of my knowledge as of 9/17/2022    Please call if questions.    Helen Bains  Medication History Technician   [phone]    9/17/2022 17:10 EDT

## 2022-09-17 NOTE — CONSULTS
Cc: Abdominal pain    History of presenting illness:   This is a nice 47-year-old gentleman with a longstanding history of intermittent abdominal pain going back at least several years.  He has had an extensive work-up over time.  There was a finding of pancreas divisum on MRI but really no other concrete findings have been identified.  The patient says that over about the last week he has had some worsening right-sided abdominal pain associate with nausea but no vomiting.  Food does not necessarily seem to exacerbate this pain.    Past Medical History: Diabetes, vitamin D deficiency, reflux, proteinuria, hypertriglyceridemia, pancreas divisum    Past Surgical History: He had an EGD and colonoscopy done in January 2021.  Laparoscopic appendectomy 2021 I believe at [facility].    Medications: Lipitor    Allergies: None known    Social History: Does not smoke cigarettes but there is a report in the chart of possible marijuana use    Family History: Negative for colorectal cancer    Review of Systems:  Constitutional: Positive for weakness, fatigue, denies fever  Neck: no swollen glands or dysphagia or odynophagia  Respiratory: negative for SOB, cough, hemoptysis or wheezing  Cardiovascular: negative for chest pain, palpitations or peripheral edema  Gastrointestinal: Positive for abdominal pain and nausea, denies cough      Physical Exam:  BMI: 27.5  Temperature 98.0 heart rate 79 blood pressure 120/83  General: alert and oriented, appropriate, no acute distress  Eyes: No scleral icterus, extraocular movements are intact  Neck: Supple without lymphadenopathy or thyromegaly, trachea is in the midline  Respiratory: There is good bilateral chest expansion, no use of accessory muscles is noted  Cardiovascular: No jugular venous distention or peripheral edema is seen  Gastrointestinal: Soft and benign, nondistended, no mass.  There is subjective right-sided tenderness without guarding or rebound.    Laboratory data:  White blood cell count 6.0 hemoglobin 13.7 glucose 256    Imaging data: CT of the abdomen pelvis reviewed by me.  There is some material within the stomach which is not particularly distended.  Some proximal small bowel loops are very mildly distended, but the overall pattern is really not consistent with bowel obstruction.  There is certainly gas seen within the colon.  Gallbladder evaluation in the past with normal ultrasound and HIDA scan.      Assessment and plan:   -Right-sided abdominal pain, etiology not clear.  Overall symptomatology really not consistent with that of small bowel obstruction.  -Would be okay for diet from my standpoint  -History of pancreas divisum, he has been followed by gastroenterology for this, I believe he was referred downtown but is not clear whether this has been further evaluated.  -All in all, I do not see any indication for surgical intervention.      Fili Rea MD, FACS  General, Minimally Invasive and Endoscopic Surgery  [facility]    [address], [address]  [place], [place], [address]  P: [phone]  F: [phone]

## 2022-09-17 NOTE — ED NOTES
Nursing report ED to floor  Smith Chisholm  47 y.o.  male    HPI :   Chief Complaint   Patient presents with   • Abdominal Pain   • right side pain       Admitting doctor:   Madison Mccurdy MD    Admitting diagnosis:   The primary encounter diagnosis was Partial small bowel obstruction (HCC). Diagnoses of Epigastric pain, Nausea, Marijuana use, and Hyperglycemia were also pertinent to this visit.    Code status:   Current Code Status     Date Active Code Status Order ID Comments User Context       Not on file    Advance Care Planning Activity          Allergies:   Patient has no known allergies.    Intake and Output  No intake or output data in the 24 hours ending 09/17/22 1737    Weight:       09/17/22  1137   Weight: 72.6 kg (160 lb)       Most recent vitals:   Vitals:    09/17/22 1551 09/17/22 1621 09/17/22 1651 09/17/22 1721   BP: 113/78 102/69 116/85 119/79   Pulse: 84 81 95 78   Resp:       Temp:       TempSrc:       SpO2: 100% 99% 97% 97%   Weight:       Height:           Active LDAs/IV Access:   Lines, Drains & Airways     Active LDAs     Name Placement date Placement time Site Days    Peripheral IV 09/17/22 1135 Right Antecubital 09/17/22  1135  Antecubital  less than 1                Labs (abnormal labs have a star):   Labs Reviewed   COMPREHENSIVE METABOLIC PANEL - Abnormal; Notable for the following components:       Result Value    Glucose 256 (*)     Sodium 132 (*)     Chloride 97 (*)     Calcium 8.5 (*)     All other components within normal limits    Narrative:     GFR Normal >60  Chronic Kidney Disease <60  Kidney Failure <15     URINALYSIS W/ MICROSCOPIC IF INDICATED (NO CULTURE) - Abnormal; Notable for the following components:    Glucose,  mg/dL (2+) (*)     Blood, UA Small (1+) (*)     Protein,  mg/dL (2+) (*)     All other components within normal limits   CBC WITH AUTO DIFFERENTIAL - Abnormal; Notable for the following components:    Eosinophil % 6.7 (*)     All other  components within normal limits   LIPASE - Normal   URINALYSIS, MICROSCOPIC ONLY   HEMOGLOBIN A1C   CBC AND DIFFERENTIAL    Narrative:     The following orders were created for panel order CBC & Differential.  Procedure                               Abnormality         Status                     ---------                               -----------         ------                     CBC Auto Differential[[id number]]        Abnormal            Final result                 Please view results for these tests on the individual orders.       EKG:   No orders to display       Meds given in ED:   Medications   sodium chloride 0.9 % flush 10 mL (has no administration in time range)   sodium chloride 0.9 % infusion (125 mL/hr Intravenous New Bag 9/17/22 1736)   morphine injection 4 mg (4 mg Intravenous Given 9/17/22 1219)   ondansetron (ZOFRAN) injection 4 mg (4 mg Intravenous Given 9/17/22 1219)   iopamidol (ISOVUE-300) 61 % injection 100 mL (85 mL Intravenous Given by Other 9/17/22 1356)   metoclopramide (REGLAN) injection 10 mg (10 mg Intravenous Given 9/17/22 1504)   diphenhydrAMINE (BENADRYL) injection 25 mg (25 mg Intravenous Given 9/17/22 1504)   pantoprazole (PROTONIX) injection 80 mg (80 mg Intravenous Given 9/17/22 1638)       Imaging results:  No radiology results for the last day    Ambulatory status:   - as tolerated    Social issues:   Social History     Socioeconomic History   • Marital status:    Tobacco Use   • Smoking status: Never Smoker   • Smokeless tobacco: Never Used   Substance and Sexual Activity   • Alcohol use: No   • Drug use: No   • Sexual activity: Defer       NIH Stroke Scale:        Nursing report ED to floor:

## 2022-09-17 NOTE — ED TRIAGE NOTES
Right sided pain from neck to lower abd x 3 days.  He has had nausea    Patient was placed in face mask during first look triage.  Patient was wearing a face mask throughout encounter.  I wore personal protective equipment throughout the encounter.  Hand hygiene was performed before and after patient encounter.

## 2022-09-18 PROBLEM — E11.65 TYPE 2 DIABETES MELLITUS WITH HYPERGLYCEMIA, WITHOUT LONG-TERM CURRENT USE OF INSULIN: Status: ACTIVE | Noted: 2022-01-17

## 2022-09-18 LAB
ANION GAP SERPL CALCULATED.3IONS-SCNC: 6.9 MMOL/L (ref 5–15)
BASOPHILS # BLD AUTO: 0.03 10*3/MM3 (ref 0–0.2)
BASOPHILS NFR BLD AUTO: 0.5 % (ref 0–1.5)
BUN SERPL-MCNC: 8 MG/DL (ref 6–20)
BUN/CREAT SERPL: 8.4 (ref 7–25)
CALCIUM SPEC-SCNC: 8 MG/DL (ref 8.6–10.5)
CHLORIDE SERPL-SCNC: 105 MMOL/L (ref 98–107)
CO2 SERPL-SCNC: 22.1 MMOL/L (ref 22–29)
CREAT SERPL-MCNC: 0.95 MG/DL (ref 0.76–1.27)
DEPRECATED RDW RBC AUTO: 42.4 FL (ref 37–54)
EGFRCR SERPLBLD CKD-EPI 2021: 100.6 ML/MIN/1.73
EOSINOPHIL # BLD AUTO: 0.38 10*3/MM3 (ref 0–0.4)
EOSINOPHIL NFR BLD AUTO: 6.4 % (ref 0.3–6.2)
ERYTHROCYTE [DISTWIDTH] IN BLOOD BY AUTOMATED COUNT: 13.7 % (ref 12.3–15.4)
GLUCOSE BLDC GLUCOMTR-MCNC: 112 MG/DL (ref 70–130)
GLUCOSE BLDC GLUCOMTR-MCNC: 130 MG/DL (ref 70–130)
GLUCOSE SERPL-MCNC: 145 MG/DL (ref 65–99)
HCT VFR BLD AUTO: 38.9 % (ref 37.5–51)
HGB BLD-MCNC: 12.8 G/DL (ref 13–17.7)
LYMPHOCYTES # BLD AUTO: 2.46 10*3/MM3 (ref 0.7–3.1)
LYMPHOCYTES NFR BLD AUTO: 41.3 % (ref 19.6–45.3)
MCH RBC QN AUTO: 27.9 PG (ref 26.6–33)
MCHC RBC AUTO-ENTMCNC: 32.9 G/DL (ref 31.5–35.7)
MCV RBC AUTO: 84.7 FL (ref 79–97)
MONOCYTES # BLD AUTO: 0.44 10*3/MM3 (ref 0.1–0.9)
MONOCYTES NFR BLD AUTO: 7.4 % (ref 5–12)
NEUTROPHILS NFR BLD AUTO: 2.63 10*3/MM3 (ref 1.7–7)
NEUTROPHILS NFR BLD AUTO: 44.1 % (ref 42.7–76)
PLATELET # BLD AUTO: 173 10*3/MM3 (ref 140–450)
PMV BLD AUTO: 10.6 FL (ref 6–12)
POTASSIUM SERPL-SCNC: 4.2 MMOL/L (ref 3.5–5.2)
RBC # BLD AUTO: 4.59 10*6/MM3 (ref 4.14–5.8)
SODIUM SERPL-SCNC: 134 MMOL/L (ref 136–145)
WBC NRBC COR # BLD: 5.96 10*3/MM3 (ref 3.4–10.8)

## 2022-09-18 PROCEDURE — 36415 COLL VENOUS BLD VENIPUNCTURE: CPT | Performed by: INTERNAL MEDICINE

## 2022-09-18 PROCEDURE — 85007 BL SMEAR W/DIFF WBC COUNT: CPT | Performed by: INTERNAL MEDICINE

## 2022-09-18 PROCEDURE — 25010000002 MORPHINE PER 10 MG: Performed by: NURSE PRACTITIONER

## 2022-09-18 PROCEDURE — 85025 COMPLETE CBC W/AUTO DIFF WBC: CPT | Performed by: INTERNAL MEDICINE

## 2022-09-18 PROCEDURE — 99212 OFFICE O/P EST SF 10 MIN: CPT | Performed by: SURGERY

## 2022-09-18 PROCEDURE — 96361 HYDRATE IV INFUSION ADD-ON: CPT

## 2022-09-18 PROCEDURE — 82962 GLUCOSE BLOOD TEST: CPT

## 2022-09-18 PROCEDURE — 80048 BASIC METABOLIC PNL TOTAL CA: CPT | Performed by: INTERNAL MEDICINE

## 2022-09-18 PROCEDURE — 96376 TX/PRO/DX INJ SAME DRUG ADON: CPT

## 2022-09-18 PROCEDURE — G0378 HOSPITAL OBSERVATION PER HR: HCPCS

## 2022-09-18 RX ADMIN — SODIUM CHLORIDE 125 ML/HR: 9 INJECTION, SOLUTION INTRAVENOUS at 01:34

## 2022-09-18 RX ADMIN — ATORVASTATIN CALCIUM 20 MG: 20 TABLET, FILM COATED ORAL at 08:34

## 2022-09-18 RX ADMIN — MORPHINE SULFATE 2 MG: 2 INJECTION, SOLUTION INTRAMUSCULAR; INTRAVENOUS at 16:24

## 2022-09-18 RX ADMIN — MORPHINE SULFATE 2 MG: 2 INJECTION, SOLUTION INTRAMUSCULAR; INTRAVENOUS at 20:50

## 2022-09-18 RX ADMIN — MORPHINE SULFATE 2 MG: 2 INJECTION, SOLUTION INTRAMUSCULAR; INTRAVENOUS at 10:51

## 2022-09-18 RX ADMIN — SODIUM CHLORIDE 125 ML/HR: 9 INJECTION, SOLUTION INTRAVENOUS at 10:51

## 2022-09-18 NOTE — PROGRESS NOTES
"    Name: Smith Chisholm ADMIT: 2022   : 1975  PCP: Roseann Us MD    MRN: [id number] LOS: 0 days   AGE/SEX: 47 y.o. male  ROOM: [facility]     Subjective   Subjective   \"A little bit better.\"  Still has abdominal pain.  No nausea or vomiting.    Review of Systems     Objective   Objective   Vital Signs  Temp:  [97 °F (36.1 °C)-97.5 °F (36.4 °C)] 97.5 °F (36.4 °C)  Heart Rate:  [66-96] 95  Resp:  [18] 18  BP: (101-132)/(69-85) 124/83  SpO2:  [93 %-100 %] 99 %  on   ;   Device (Oxygen Therapy): room air  Body mass index is 27.46 kg/m².  Physical Exam  Vitals and nursing note reviewed.   Constitutional:       General: He is not in acute distress.     Appearance: Normal appearance.   Neck:      Vascular: No JVD.      Trachea: No tracheal deviation.   Cardiovascular:      Rate and Rhythm: Normal rate and regular rhythm.      Heart sounds: Normal heart sounds.   Pulmonary:      Effort: Pulmonary effort is normal. No respiratory distress.      Breath sounds: Normal breath sounds.   Abdominal:      General: Bowel sounds are normal.      Palpations: Abdomen is soft.      Tenderness: There is no abdominal tenderness.   Skin:     General: Skin is warm and dry.   Neurological:      General: No focal deficit present.      Mental Status: He is alert and oriented to person, place, and time.   Psychiatric:         Behavior: Behavior normal. Behavior is cooperative.       Results Review     I reviewed the patient's new clinical results.  Results from last 7 days   Lab Units 22  0729 22  1219   WBC 10*3/mm3 5.96 6.01   HEMOGLOBIN g/dL 12.8* 13.7   PLATELETS 10*3/mm3 173 188     Results from last 7 days   Lab Units 22  0729 22  1219   SODIUM mmol/L 134* 132*   POTASSIUM mmol/L 4.2 3.7   CHLORIDE mmol/L 105 97*   CO2 mmol/L 22.1 26.0   BUN mg/dL 8 11   CREATININE mg/dL 0.95 0.92   GLUCOSE mg/dL 145* 256*   EGFR mL/min/1.73 100.6 104.5     Results from last 7 days   Lab Units 22  1219 "   ALBUMIN g/dL 3.70   BILIRUBIN mg/dL 0.4   ALK PHOS U/L 96   AST (SGOT) U/L 34   ALT (SGPT) U/L 32     Results from last 7 days   Lab Units 09/18/22  0729 09/17/22  1219   CALCIUM mg/dL 8.0* 8.5*   ALBUMIN g/dL  --  3.70       Hemoglobin A1C   Date/Time Value Ref Range Status   09/17/2022 1219 7.70 (H) 4.80 - 5.60 % Final     Glucose   Date/Time Value Ref Range Status   09/18/2022 0821 130 70 - 130 mg/dL Final     Comment:     Meter: [id number] : [id number] Beamus Regga'nay NA       CT SCAN ABDOMEN AND PELVIS WITH IV CONTRAST   09/17/22   1. The lung bases are clear. There is diffuse fatty infiltration of the   liver similar to 05/27/2021. The spleen, pancreas, and both adrenal   glands are unremarkable. There are multiple small bilateral renal cysts   and no evidence of renal obstruction. The gallbladder appears normal.   2. There is no aortic aneurysm or retroperitoneal lymphadenopathy. There   is a moderate amount of gastric contents within the stomach and there   are also multiple fluid-filled proximal small bowel loops measuring up   to 2.9 cm with decompressed small bowel distally. The findings are   nonspecific but could relate to early changes of partial small bowel   obstruction. No abnormality is seen in the pelvis.       Scheduled Medications  atorvastatin, 20 mg, Oral, Daily  insulin lispro, 0-9 Units, Subcutaneous, TID With Meals    Infusions  sodium chloride, 125 mL/hr, Last Rate: 125 mL/hr (09/18/22 1051)    Diet  Diet Clear Liquid       Assessment/Plan     Active Hospital Problems    Diagnosis  POA   • **Partial small bowel obstruction (HCC) [K56.600]  Yes   • Type 2 diabetes mellitus with hyperglycemia, without long-term current use of insulin (HCC) [E11.65]  Yes   • GERD without esophagitis [K21.9]  Yes      Resolved Hospital Problems   No resolved problems to display.       47 y.o. male admitted with Partial small bowel obstruction (HCC).    Await general surgery follow-up recommendations.  " Clinically does not seem obstructed.  \"I came here to be cured.\"  Not interested in discharge today unless we make him feel better.    His diabetes is not a new diagnosis as suggested by ER staff.  This has been part of his medical record for the last year and being watched by his PCP.  A1c elevated but this does not have to be addressed in the hospital setting.  Will more than likely defer back to his PCP after discharge.    Otherwise medically stable.      SCDs for DVT prophylaxis.  Full code.  Discussed with patient.  Anticipate discharge home when cleared by consultants.      Chito Dawn MD  [facility]  09/18/22  11:20 EDT      "

## 2022-09-18 NOTE — H&P
HISTORY AND PHYSICAL   [facility]        Date of Admission: 2022  Patient Identification:  Name: Smith Chisholm  Age: 47 y.o.  Sex: male  :  1975  MRN: [id number]                     Primary Care Physician: Roseann Us MD    Chief Complaint:  47 year old gentleman who presented to the emergency room with abdominal pain which started three days ago; he has had nausea; the pain got worse this morning; he denies fever or chills; he has had bowel movements; he has had an appendectomy; denies sick contacts    History of Present Illness:   As above    Past Medical History:  Past Medical History:   Diagnosis Date   • Abdominal pain    • Acute seasonal allergic rhinitis due to pollen    • Eczema    • Hypertriglyceridemia    • Nephrolithiasis    • Paresthesia of left leg      Past Surgical History:  Past Surgical History:   Procedure Laterality Date   • APPENDECTOMY     • COLONOSCOPY N/A 2021    Procedure: COLONOSCOPY to cecum and TI with cold polypectomy;  Surgeon: Francesco Chowdhury MD;  Location: [facility] ENDOSCOPY;  Service: Gastroenterology;  Laterality: N/A;  pre: abdominal pain and rectal bleeding  post: internal hemorrhoids, normal TI, polyp   • ENDOSCOPY N/A 2021    Procedure: ESOPHAGOGASTRODUODENOSCOPY with cold biopsies;  Surgeon: Francesco Chowdhury MD;  Location: [facility] ENDOSCOPY;  Service: Gastroenterology;  Laterality: N/A;  pre: h/o ulcers  post: ulcers healed, gastritis      Home Meds:  Medications Prior to Admission   Medication Sig Dispense Refill Last Dose   • atorvastatin (LIPITOR) 20 MG tablet Take 1 tablet by mouth Daily. 30 tablet 0 2022 at Unknown time       Allergies:  No Known Allergies  Immunizations:  Immunization History   Administered Date(s) Administered   • COVID-19 (PFIZER) PURPLE CAP 2021, 2021, 2021   • Flu Vaccine Intradermal Quad 18-64YR 2019     Social History:   Social History     Social History Narrative   • Not on file  "    Social History     Socioeconomic History   • Marital status:    Tobacco Use   • Smoking status: Never Smoker   • Smokeless tobacco: Never Used   Substance and Sexual Activity   • Alcohol use: No   • Drug use: No   • Sexual activity: Defer       Family History:  Family History   Problem Relation Age of Onset   • No Known Problems Mother         Review of Systems  See history of present illness and past medical history.  Patient denies headache, dizziness, syncope, falls, trauma, change in vision, change in hearing, change in taste, changes in weight, changes in appetite, focal weakness, numbness, or paresthesia.  Patient denies chest pain, palpitations, dyspnea, orthopnea, PND, cough, sinus pressure, rhinorrhea, epistaxis, hemoptysis,  vomiting,hematemesis, diarrhea, constipation or hematchezia.  Denies cold or heat intolerance, polydipsia, polyuria, polyphagia. Denies hematuria, pyuria, dysuria, hesitancy, frequency or urgency. Denies consumption of raw and under cooked meats foods or change in water source.  Denies fever, chills, sweats, night sweats.  Denies missing any routine medications. Remainder of ROS is negative.    Objective:  T Max 24 hrs: Temp (24hrs), Av °F (36.7 °C), Min:98 °F (36.7 °C), Max:98 °F (36.7 °C)    Vitals Ranges:   Temp:  [98 °F (36.7 °C)] 98 °F (36.7 °C)  Heart Rate:  [] 72  Resp:  [16-18] 18  BP: (101-132)/(69-85) 101/78      Exam:  /78   Pulse 72   Temp 98 °F (36.7 °C) (Tympanic)   Resp 18   Ht 162.6 cm (64\")   Wt 72.6 kg (160 lb)   SpO2 97%   BMI 27.46 kg/m²     General Appearance:    Alert, cooperative, no distress, appears stated age   Head:    Normocephalic, without obvious abnormality, atraumatic   Eyes:    PERRL, conjunctivae/corneas clear, EOM's intact, both eyes   Ears:    Normal external ear canals, both ears   Nose:   Nares normal, septum midline, mucosa normal, no drainage    or sinus tenderness   Throat:   Lips, mucosa, and tongue normal "   Neck:   Supple, symmetrical, trachea midline, no adenopathy;     thyroid:  no enlargement/tenderness/nodules; no carotid    bruit or JVD   Back:     Symmetric, no curvature, ROM normal, no CVA tenderness   Lungs:     Clear to auscultation bilaterally, respirations unlabored   Chest Wall:    No tenderness or deformity    Heart:    Regular rate and rhythm, S1 and S2 normal, no murmur, rub   or gallop   Abdomen:     Soft, nontender, bowel sounds active all four quadrants,     no masses, no hepatomegaly, no splenomegaly   Extremities:   Extremities normal, atraumatic, no cyanosis or edema   Pulses:   2+ and symmetric all extremities   Skin:   Skin color, texture, turgor normal, no rashes or lesions   Lymph nodes:   Cervical, supraclavicular, and axillary nodes normal   Neurologic:   CNII-XII intact, normal strength, sensation intact throughout      .    Data Review:  Labs in chart were reviewed.  WBC   Date Value Ref Range Status   09/17/2022 6.01 3.40 - 10.80 10*3/mm3 Final     Hemoglobin   Date Value Ref Range Status   09/17/2022 13.7 13.0 - 17.7 g/dL Final     Hematocrit   Date Value Ref Range Status   09/17/2022 42.7 37.5 - 51.0 % Final     Platelets   Date Value Ref Range Status   09/17/2022 188 140 - 450 10*3/mm3 Final     Sodium   Date Value Ref Range Status   09/17/2022 132 (L) 136 - 145 mmol/L Final     Potassium   Date Value Ref Range Status   09/17/2022 3.7 3.5 - 5.2 mmol/L Final     Comment:     Slight hemolysis detected by analyzer. Results may be affected.     Chloride   Date Value Ref Range Status   09/17/2022 97 (L) 98 - 107 mmol/L Final     CO2   Date Value Ref Range Status   09/17/2022 26.0 22.0 - 29.0 mmol/L Final     BUN   Date Value Ref Range Status   09/17/2022 11 6 - 20 mg/dL Final     Creatinine   Date Value Ref Range Status   09/17/2022 0.92 0.76 - 1.27 mg/dL Final     Glucose   Date Value Ref Range Status   09/17/2022 256 (H) 65 - 99 mg/dL Final     Calcium   Date Value Ref Range Status    09/17/2022 8.5 (L) 8.6 - 10.5 mg/dL Final     AST (SGOT)   Date Value Ref Range Status   09/17/2022 34 1 - 40 U/L Final     ALT (SGPT)   Date Value Ref Range Status   09/17/2022 32 1 - 41 U/L Final     Alkaline Phosphatase   Date Value Ref Range Status   09/17/2022 96 39 - 117 U/L Final           Results from last 7 days   Lab Units 09/17/22  1219   HEMOGLOBIN A1C % 7.70*      Imaging Results (All)     Procedure Component Value Units Date/Time    CT Abdomen Pelvis With Contrast [[id number]] Collected: 09/17/22 1522     Updated: 09/17/22 1614    Narrative:      CT SCAN OF THE ABDOMEN AND PELVIS WITH INTRAVENOUS CONTRAST     HISTORY: Right upper quadrant pain which is worsening.     FINDINGS:  The CT scan was performed as an emergency procedure through  the abdomen and pelvis with intravenous contrast. It is compared to  previous CT scan of the abdomen dated 05/27/2021 and MRI scan of the  abdomen dated 10/20/2021. The following findings are present:  1. The lung bases are clear. There is diffuse fatty infiltration of the  liver similar to 05/27/2021. The spleen, pancreas, and both adrenal  glands are unremarkable. There are multiple small bilateral renal cysts  and no evidence of renal obstruction. The gallbladder appears normal.  2. There is no aortic aneurysm or retroperitoneal lymphadenopathy. There  is a moderate amount of gastric contents within the stomach and there  are also multiple fluid-filled proximal small bowel loops measuring up  to 2.9 cm with decompressed small bowel distally. The findings are  nonspecific but could relate to early changes of partial small bowel  obstruction. No abnormality is seen in the pelvis.     Radiation dose reduction techniques were utilized, including automated  exposure control and exposure modulation based on body size.     This report was finalized on 9/17/2022 4:11 PM by Dr. Samm Gallardo M.D.               Assessment:  Active Hospital Problems    Diagnosis  POA   •  Partial small bowel obstruction (HCC) [K56.600]  Yes      Resolved Hospital Problems   No resolved problems to display.   abdominal pain  Nausea  New onset diabetes    Plan:  Ask the diabetic educator to see him  accu checks, insulin sliding scale  Surgery input noted  Clear liquid diet for now and will advance as tolerated  Fluids, antiemetics, pain meds  D.w patient and ED provider    Madison Mccurdy MD  9/17/2022  20:55 EDT

## 2022-09-18 NOTE — PROGRESS NOTES
Follow-up abdominal pain    Overall feels somewhat better.  He has had bowel function and denies any nausea or vomiting.  He says he is hungry.    Objective:  Afebrile with stable vitals  General: Awake and alert without distress  Abdomen: Soft and benign, no objective tenderness noted    Labs reviewed, unremarkable    Assessment and plan:  -Abdominal pain, etiology unclear  -Clinically no evidence of small bowel obstruction, advance diet as tolerated  -Unfortunately I do think there is anything to offer from general surgery standpoint.  Patient is quite frustrated with his lack of progress, which is understandable.  I do not know whether this pancreas divisum may be contributing to his chronic issue.  Its not clear to me whether he ever completed his referral as the GI service said requested.  If he does not improve, may be worthwhile to ask GIs opinion on this as well.  No plans for any surgical intervention.    Fili Rea MD  General and Endoscopic Surgery  [facility]    [address], [address]  [place], [place], [address]  P: [phone]  F: [phone]

## 2022-09-18 NOTE — PLAN OF CARE
Goal Outcome Evaluation:  Plan of Care Reviewed With: patient        Progress: improving  Outcome Evaluation: VSS, am labs, IVF, PRN pain meds, monitor BG, surgery consulted

## 2022-09-18 NOTE — PLAN OF CARE
Goal Outcome Evaluation:         A&O x 4, room air, up ad anita, IVF infusing, awaiting GI consult, eager to go home, Morphine given x 2, tolerating GI soft diet, nursing will continue to monitor and adjust plan of care accordingly.

## 2022-09-19 ENCOUNTER — READMISSION MANAGEMENT (OUTPATIENT)
Dept: CALL CENTER | Facility: HOSPITAL | Age: 47
End: 2022-09-19

## 2022-09-19 ENCOUNTER — TELEPHONE (OUTPATIENT)
Dept: GASTROENTEROLOGY | Facility: CLINIC | Age: 47
End: 2022-09-19

## 2022-09-19 VITALS
HEART RATE: 100 BPM | SYSTOLIC BLOOD PRESSURE: 145 MMHG | BODY MASS INDEX: 27.31 KG/M2 | RESPIRATION RATE: 16 BRPM | DIASTOLIC BLOOD PRESSURE: 101 MMHG | HEIGHT: 64 IN | TEMPERATURE: 98.7 F | OXYGEN SATURATION: 98 % | WEIGHT: 160 LBS

## 2022-09-19 LAB — GLUCOSE BLDC GLUCOMTR-MCNC: 130 MG/DL (ref 70–130)

## 2022-09-19 PROCEDURE — 96361 HYDRATE IV INFUSION ADD-ON: CPT

## 2022-09-19 PROCEDURE — 99214 OFFICE O/P EST MOD 30 MIN: CPT | Performed by: INTERNAL MEDICINE

## 2022-09-19 PROCEDURE — G0378 HOSPITAL OBSERVATION PER HR: HCPCS

## 2022-09-19 PROCEDURE — 82962 GLUCOSE BLOOD TEST: CPT

## 2022-09-19 RX ADMIN — SODIUM CHLORIDE 125 ML/HR: 9 INJECTION, SOLUTION INTRAVENOUS at 00:47

## 2022-09-19 NOTE — OUTREACH NOTE
Prep Survey    Flowsheet Row Responses   [facility] patient discharged [facility]? [facility]   Is LACE score < 7 ? Yes   Emergency Room discharge w/ pulse ox? No   Eligibility [facility]   Date of Admission 09/17/22   Date of Discharge 09/19/22   Discharge Disposition Home or Self Care   Discharge diagnosis partial SBO, T2DM, GERD   Does the patient have one of the following disease processes/diagnoses(primary or secondary)? Other   Does the patient have Home health ordered? No   Is there a DME ordered? No   Prep survey completed? Yes          CHRISTIE RENTERIA - Registered Nurse

## 2022-09-19 NOTE — TELEPHONE ENCOUNTER
----- Message from Hetal Oconnor MD sent at 9/19/2022  9:26 AM EDT -----  Regarding: call regarding f/u with [place]  Could you please contact this gentleman and let him know about the follow-up appointment I made with Dr Chowdhury?  He was discharged from the hospital today

## 2022-09-19 NOTE — PLAN OF CARE
Goal Outcome Evaluation:         Discharge Education completed at bedside with patient, IV removed, all questions answered and all concerns addressed.

## 2022-09-19 NOTE — DISCHARGE SUMMARY
Patient Name: Smith Chisholm  : 1975  MRN: [id number]    Date of Admission: 2022  Date of Discharge:  2022  Primary Care Physician: Roseann Us MD      Chief Complaint:   Abdominal Pain and right side pain      Discharge Diagnoses     Active Hospital Problems    Diagnosis  POA   • **Partial small bowel obstruction (HCC) [K56.600]  Yes   • Type 2 diabetes mellitus with hyperglycemia, without long-term current use of insulin (HCC) [E11.65]  Yes   • GERD without esophagitis [K21.9]  Yes      Resolved Hospital Problems   No resolved problems to display.        Hospital Course     Mr. Chisholm is a 47 y.o. male with a history of diabetes and pancreatic divisum who presented to [facility] initially complaining of abdominal pain.  Please see the admitting history and physical for further details.  He was found to have possible early bowel obstruction on CT and was admitted to the hospital for further evaluation and treatment.  He was admitted to hospital seen by general surgery.  They felt clinically he did not have a bowel obstruction.  We advance his diet and fortunately his pain resolved.  He was seen by gastroenterology who recommended continued outpatient follow-up.  As his pain has subsided and he is requesting discharge plan will be to discharge home today for continued outpatient follow-up.      Day of Discharge     Subjective:  No further abdominal pain wants to go home    Physical Exam:  Temp:  [97.9 °F (36.6 °C)-98.7 °F (37.1 °C)] 98.7 °F (37.1 °C)  Heart Rate:  [] 100  Resp:  [16] 16  BP: (113-145)/() 145/101  Body mass index is 27.46 kg/m².  Physical Exam  Vitals and nursing note reviewed.   Constitutional:       Appearance: Normal appearance.   Pulmonary:      Effort: Pulmonary effort is normal.   Neurological:      Mental Status: He is alert. Mental status is at baseline.   Psychiatric:         Mood and Affect: Mood normal.         Consultants     Consult  Orders (all) (From admission, onward)       Start     Ordered    09/18/22 1541  Inpatient Gastroenterology Consult  Once        Specialty:  Gastroenterology  Provider:  Francesco Chowdhury MD    09/18/22 1541    09/17/22 2058  Diabetes Educator Consult  Once        Provider:  (Not yet assigned)    09/17/22 2058 09/17/22 1611  Surgery (on-call MD unless specified)  Once        Specialty:  General Surgery  Provider:  (Not yet assigned)    09/17/22 1610                  Procedures     Imaging Results (All)       Procedure Component Value Units Date/Time    CT Abdomen Pelvis With Contrast [[id number]] Collected: 09/17/22 1522     Updated: 09/17/22 1614    Narrative:      CT SCAN OF THE ABDOMEN AND PELVIS WITH INTRAVENOUS CONTRAST     HISTORY: Right upper quadrant pain which is worsening.     FINDINGS:  The CT scan was performed as an emergency procedure through  the abdomen and pelvis with intravenous contrast. It is compared to  previous CT scan of the abdomen dated 05/27/2021 and MRI scan of the  abdomen dated 10/20/2021. The following findings are present:  1. The lung bases are clear. There is diffuse fatty infiltration of the  liver similar to 05/27/2021. The spleen, pancreas, and both adrenal  glands are unremarkable. There are multiple small bilateral renal cysts  and no evidence of renal obstruction. The gallbladder appears normal.  2. There is no aortic aneurysm or retroperitoneal lymphadenopathy. There  is a moderate amount of gastric contents within the stomach and there  are also multiple fluid-filled proximal small bowel loops measuring up  to 2.9 cm with decompressed small bowel distally. The findings are  nonspecific but could relate to early changes of partial small bowel  obstruction. No abnormality is seen in the pelvis.     Radiation dose reduction techniques were utilized, including automated  exposure control and exposure modulation based on body size.     This report was finalized on 9/17/2022 4:11  PM by Dr. Samm Gallardo M.D.                 Pertinent Labs     Results from last 7 days   Lab Units 09/18/22  0729 09/17/22  1219   WBC 10*3/mm3 5.96 6.01   HEMOGLOBIN g/dL 12.8* 13.7   PLATELETS 10*3/mm3 173 188     Results from last 7 days   Lab Units 09/18/22  0729 09/17/22  1219   SODIUM mmol/L 134* 132*   POTASSIUM mmol/L 4.2 3.7   CHLORIDE mmol/L 105 97*   CO2 mmol/L 22.1 26.0   BUN mg/dL 8 11   CREATININE mg/dL 0.95 0.92   GLUCOSE mg/dL 145* 256*   EGFR mL/min/1.73 100.6 104.5     Results from last 7 days   Lab Units 09/17/22  1219   ALBUMIN g/dL 3.70   BILIRUBIN mg/dL 0.4   ALK PHOS U/L 96   AST (SGOT) U/L 34   ALT (SGPT) U/L 32     Results from last 7 days   Lab Units 09/18/22  0729 09/17/22  1219   CALCIUM mg/dL 8.0* 8.5*   ALBUMIN g/dL  --  3.70     Results from last 7 days   Lab Units 09/17/22  1219   LIPASE U/L 39             Invalid input(s): LDLCALC          Test Results Pending at Discharge       Discharge Details        Discharge Medications        Continue These Medications        Instructions Start Date   atorvastatin 20 MG tablet  Commonly known as: LIPITOR   20 mg, Oral, Daily               No Known Allergies    Discharge Disposition:  Home or Self Care      Discharge Diet:  No active diet order      Discharge Activity:   Activity Instructions       Activity as Tolerated              CODE STATUS:    Code Status and Medical Interventions:   Ordered at: 09/17/22 1900     Code Status (Patient has no pulse and is not breathing):    CPR (Attempt to Resuscitate)     Medical Interventions (Patient has pulse or is breathing):    Full Support       Future Appointments   Date Time Provider Department Center   10/10/2022  1:45 PM Francesco Chowdhury MD [facility]     Additional Instructions for the Follow-ups that You Need to Schedule       Discharge Follow-up with PCP   As directed       Currently Documented PCP:    Roseann Us MD    PCP Phone Number:    [phone]     Follow Up Details: 1  to 2 weeks (or sooner if problems)                Follow-up Information       Francesco Chowdhury MD. Schedule an appointment as soon as possible for a visit.    Specialty: Gastroenterology  Contact information:  [address]  [facility] [id number] [phone]               Roseann Us MD .    Specialties: Family Medicine, Emergency Medicine, Urgent Care  Why: 1 to 2 weeks (or sooner if problems)  Contact information:  [address]  [facility] [id number] [phone]                             Additional Instructions for the Follow-ups that You Need to Schedule       Discharge Follow-up with PCP   As directed       Currently Documented PCP:    Roseann sU MD    PCP Phone Number:    [phone]     Follow Up Details: 1 to 2 weeks (or sooner if problems)                 Chito Dawn MD  [facility]  09/19/22  08:37 EDT

## 2022-09-19 NOTE — CONSULTS
[facility]  Initial Inpatient Consult Note    Referring Provider: Dr. Buck    Reason for Consultation: Pancreas divisum    Subjective     History of present illness:    Thank you for asking my opinion regarding this patient  47 y.o. male, followed by Dr. Francesco Chowdhury in the outpatient setting, with recurrent episodes of acute on chronic abdominal pain.  Patient was admitted on 917 due to abdominal pain and nausea.  Pain progressively worsened which prompted his ER visit.      He was last seen by Dr Chowdhury in September 2021 complaining of right upper quadrant pain.  He had unremarkable CT scan, normal HIDA scan and unremarkable EGD.  He had tried Levsin Bentyl PPI and Carafate with no relief of the pain.  He had mildly elevated liver tests at that time.  He was started on desipramine at that time and has not followed up.    GI was consulted for pancreas divisum.  Work-up has been normal in the past with the exception of findings of pancreas divisum on MRCP from last year.  Recommendations at that time were to follow-up with a biliary specialist for biliary manometry to rule out sphincter of Oddi dysfunction.    Past Medical History:  Past Medical History:   Diagnosis Date   • Abdominal pain    • Acute seasonal allergic rhinitis due to pollen    • Eczema    • Hypertriglyceridemia    • Nephrolithiasis    • Paresthesia of left leg      Past Surgical History:  Past Surgical History:   Procedure Laterality Date   • APPENDECTOMY     • COLONOSCOPY N/A 1/14/2021    Procedure: COLONOSCOPY to cecum and TI with cold polypectomy;  Surgeon: Francesco Chowdhury MD;  Location: [facility] ENDOSCOPY;  Service: Gastroenterology;  Laterality: N/A;  pre: abdominal pain and rectal bleeding  post: internal hemorrhoids, normal TI, polyp   • ENDOSCOPY N/A 1/14/2021    Procedure: ESOPHAGOGASTRODUODENOSCOPY with cold biopsies;  Surgeon: Francesco Chowdhury MD;  Location: [facility] ENDOSCOPY;  Service: Gastroenterology;   Laterality: N/A;  pre: h/o ulcers  post: ulcers healed, gastritis      Social History:   Social History     Tobacco Use   • Smoking status: Never Smoker   • Smokeless tobacco: Never Used   Substance Use Topics   • Alcohol use: No      Family History:  Family History   Problem Relation Age of Onset   • No Known Problems Mother        Home Meds:  Medications Prior to Admission   Medication Sig Dispense Refill Last Dose   • atorvastatin (LIPITOR) 20 MG tablet Take 1 tablet by mouth Daily. 30 tablet 0 9/16/2022 at Unknown time     Current Meds:   atorvastatin, 20 mg, Oral, Daily  insulin lispro, 0-9 Units, Subcutaneous, TID With Meals      Allergies:  No Known Allergies  Review of Systems  Pertinent items are noted in HPI     Objective     Vital Signs  Temp:  [97.9 °F (36.6 °C)-98.7 °F (37.1 °C)] 98.7 °F (37.1 °C)  Heart Rate:  [] 100  Resp:  [16] 16  BP: (113-145)/() 145/101  Physical Exam:  General Appearance:    Alert, cooperative, in no acute distress   Head:    Normocephalic, without obvious abnormality, atraumatic   Eyes:          conjunctivae and sclerae normal, no   icterus   Throat:   no thrush, oral mucosa moist   Neck:   Supple, normal ROM           Chest Wall:    No abnormalities observed           Skin:   No bruising or rash   Psychiatric:  normal mood and insight     Results Review:   I reviewed the patient's new clinical results.    Results from last 7 days   Lab Units 09/18/22  0729 09/17/22  1219   WBC 10*3/mm3 5.96 6.01   HEMOGLOBIN g/dL 12.8* 13.7   HEMATOCRIT % 38.9 42.7   PLATELETS 10*3/mm3 173 188     Results from last 7 days   Lab Units 09/18/22  0729 09/17/22  1219   SODIUM mmol/L 134* 132*   POTASSIUM mmol/L 4.2 3.7   CHLORIDE mmol/L 105 97*   CO2 mmol/L 22.1 26.0   BUN mg/dL 8 11   CREATININE mg/dL 0.95 0.92   CALCIUM mg/dL 8.0* 8.5*   BILIRUBIN mg/dL  --  0.4   ALK PHOS U/L  --  96   ALT (SGPT) U/L  --  32   AST (SGOT) U/L  --  34   GLUCOSE mg/dL 145* 256*         Lab Results   Lab  Value Date/Time    LIPASE 39 09/17/2022 1219    LIPASE 48 06/02/2021 2204    LIPASE 99 12/30/2020 1135    LIPASE 76 06/04/2020 0958    LIPASE 85 12/02/2019 0200       Radiology:  CT Abdomen Pelvis With Contrast   Final Result          Assessment & Plan   Patient Active Problem List   Diagnosis   • Nephrolithiasis   • Hypertriglyceridemia   • Eczema   • Acute seasonal allergic rhinitis due to pollen   • Bilateral chronic serous otitis media   • Generalized abdominal pain   • Primary insomnia   • Migraine without aura and without status migrainosus, not intractable   • Chronic gastric ulcer without hemorrhage and without perforation   • Vitamin D deficiency   • Lower abdominal pain   • ST (sore throat)   • GERD without esophagitis   • Renal cyst   • Fatty liver   • Type 2 diabetes mellitus with hyperglycemia, without long-term current use of insulin (HCC)   • Other proteinuria   • Partial small bowel obstruction (HCC)       Assessment:  1. Acute on chronic upper abdominal pain  2. Pancreas divisum    Plan:  · As per previous recommendations, patient advised to follow-up with Dr. [place]IA Mehta or Dr Young for further evaluation of his pancreas divisum and chronic recurring abdominal pain with consideration of biliary manometry to r/o SOD dysfunction- patient given contact numbers for these offices  · Will arrange for follow-up with Dr Chowdhury  · LF diet      I discussed the patients findings and my recommendations with patient and nursing staff.    Hetal Oconnor MD

## 2022-09-20 ENCOUNTER — TRANSITIONAL CARE MANAGEMENT TELEPHONE ENCOUNTER (OUTPATIENT)
Dept: CALL CENTER | Facility: HOSPITAL | Age: 47
End: 2022-09-20

## 2022-09-20 NOTE — CASE MANAGEMENT/SOCIAL WORK
Case Management Discharge Note      Final Note: DC'd home via private car. Jessie RENTERIA RN         Selected Continued Care - Discharged on 9/19/2022 Admission date: 9/17/2022 - Discharge disposition: Home or Self Care    Destination    No services have been selected for the patient.              Durable Medical Equipment    No services have been selected for the patient.              Dialysis/Infusion    No services have been selected for the patient.              Home Medical Care    No services have been selected for the patient.              Therapy    No services have been selected for the patient.              Community Resources    No services have been selected for the patient.              Community & DME    No services have been selected for the patient.                  Transportation Services  Private: Car    Final Discharge Disposition Code: 01 - home or self-care

## 2022-09-20 NOTE — OUTREACH NOTE
Call Center TCM Note    Flowsheet Row Responses   [facility] patient discharged from? [facility]   Does the patient have one of the following disease processes/diagnoses(primary or secondary)? Other   TCM attempt successful? Yes   Discharge diagnosis partial SBO, T2DM, GERD   Meds reviewed with patient/caregiver? Yes   Does the patient have all medications ordered at discharge? N/A   Comments TCM APPT with PCP Dr Us is 09/22/2022.   Has home health visited the patient within 72 hours of discharge? N/A   Psychosocial issues? No   Did the patient receive a copy of their discharge instructions? Yes   Nursing interventions Reviewed instructions with patient   What is the patient's perception of their health status since discharge? Improving   Is the patient/caregiver able to teach back signs and symptoms related to disease process for when to call PCP? Yes   Is the patient/caregiver able to teach back signs and symptoms related to disease process for when to call 911? Yes   Is the patient/caregiver able to teach back the hierarchy of who to call/visit for symptoms/problems? PCP, Specialist, Home health nurse, Urgent Care, ED, 911 Yes   If the patient is a current smoker, are they able to teach back resources for cessation? Not a smoker   TCM call completed? Yes   Wrap up additional comments D/C DX: partial SBO, T2DM, GERD **  Brief call, I am unsure how much pt could hear/understand me. Pt did confirm he is better, abdominal pain improved. No changes made to pt medications at d/c. TCM APPT with PCP Dr Us is 09/22/2022.          Cherri Patterson MA    9/20/2022, 14:32 EDT

## 2022-09-22 ENCOUNTER — OFFICE VISIT (OUTPATIENT)
Dept: FAMILY MEDICINE CLINIC | Facility: CLINIC | Age: 47
End: 2022-09-22

## 2022-09-22 VITALS
HEART RATE: 89 BPM | BODY MASS INDEX: 26.94 KG/M2 | WEIGHT: 157.8 LBS | DIASTOLIC BLOOD PRESSURE: 78 MMHG | OXYGEN SATURATION: 98 % | TEMPERATURE: 97.8 F | HEIGHT: 64 IN | SYSTOLIC BLOOD PRESSURE: 134 MMHG | RESPIRATION RATE: 18 BRPM

## 2022-09-22 DIAGNOSIS — E11.65 TYPE 2 DIABETES MELLITUS WITH HYPERGLYCEMIA, WITHOUT LONG-TERM CURRENT USE OF INSULIN: ICD-10-CM

## 2022-09-22 DIAGNOSIS — Z09 HOSPITAL DISCHARGE FOLLOW-UP: Primary | ICD-10-CM

## 2022-09-22 DIAGNOSIS — E78.1 HYPERTRIGLYCERIDEMIA: ICD-10-CM

## 2022-09-22 DIAGNOSIS — K56.600 PARTIAL SMALL BOWEL OBSTRUCTION: ICD-10-CM

## 2022-09-22 DIAGNOSIS — K21.01 GASTROESOPHAGEAL REFLUX DISEASE WITH ESOPHAGITIS AND HEMORRHAGE: ICD-10-CM

## 2022-09-22 DIAGNOSIS — G44.89 OTHER HEADACHE SYNDROME: ICD-10-CM

## 2022-09-22 DIAGNOSIS — Z23 IMMUNIZATION DUE: ICD-10-CM

## 2022-09-22 PROCEDURE — 90471 IMMUNIZATION ADMIN: CPT | Performed by: FAMILY MEDICINE

## 2022-09-22 PROCEDURE — 99214 OFFICE O/P EST MOD 30 MIN: CPT | Performed by: FAMILY MEDICINE

## 2022-09-22 PROCEDURE — 90677 PCV20 VACCINE IM: CPT | Performed by: FAMILY MEDICINE

## 2022-09-22 RX ORDER — OMEPRAZOLE 40 MG/1
40 CAPSULE, DELAYED RELEASE ORAL 2 TIMES DAILY
Qty: 60 CAPSULE | Refills: 3 | Status: SHIPPED | OUTPATIENT
Start: 2022-09-22 | End: 2023-01-17

## 2022-09-22 RX ORDER — FENOFIBRATE 145 MG/1
145 TABLET, COATED ORAL DAILY
Qty: 30 TABLET | Refills: 5 | Status: SHIPPED | OUTPATIENT
Start: 2022-09-22 | End: 2023-03-10

## 2022-09-22 NOTE — PROGRESS NOTES
Transitional Care Follow Up Visit  Subjective     Smith Chisholm is a 47 y.o. male who presents for a transitional care management visit.    Within 48 business hours after discharge our office contacted him via telephone to coordinate his care and needs.      I reviewed and discussed the details of that call along with the discharge summary, hospital problems, inpatient lab results, inpatient diagnostic studies, and consultation reports with Smith.     Current outpatient and discharge medications have been reconciled for the patient.  Reviewed by: Roseann Us MD    D/C Dx: Partial bowel obstruction, diabetes type 2, GERD without esophagitis.    Date of TCM Phone Call 9/19/2022   [facility]   Date of Admission 9/17/2022   Date of Discharge 9/19/2022   Discharge Disposition Home or Self Care     Risk for Readmission (LACE) Score: 6 (9/19/2022  6:00 AM)      History of Present Illness   Patient presents for hospital follow-up.  His A1c was 7.7%    Hypertriglyceridemia needs labs.  No medication.    Renal insufficiency-started on losartan 25 mg a while ago by renal  Course During Hospital Stay:  Mr. Chisholm is a 47 y.o. male with a history of diabetes and pancreatic divisum who presented to [facility] initially complaining of abdominal pain.  Please see the admitting history and physical for further details.  He was found to have possible early bowel obstruction on CT and was admitted to the hospital for further evaluation and treatment.  He was admitted to hospital seen by general surgery.  They felt clinically he did not have a bowel obstruction.  We advance his diet and fortunately his pain resolved.  He was seen by gastroenterology who recommended continued outpatient follow-up.  As his pain has subsided and he is requesting discharge plan will be to discharge home today for continued outpatient follow-up.     The following portions of the patient's history were reviewed and updated as  appropriate: allergies, current medications, past family history, past medical history, past social history, past surgical history and problem list.    Review of Systems    Objective   Physical Exam  Vitals and nursing note reviewed.   Constitutional:       Appearance: Normal appearance. He is well-developed.   Cardiovascular:      Rate and Rhythm: Normal rate and regular rhythm.      Heart sounds: Normal heart sounds. No murmur heard.  Pulmonary:      Effort: Pulmonary effort is normal. No respiratory distress.      Breath sounds: Normal breath sounds. No stridor. No wheezing or rhonchi.   Neurological:      General: No focal deficit present.      Mental Status: He is alert and oriented to person, place, and time. He is not disoriented.   Psychiatric:         Mood and Affect: Mood normal.         Behavior: Behavior normal.         Assessment & Plan   Diagnoses and all orders for this visit:    1. Hospital discharge follow-up (Primary)    2. Partial small bowel obstruction (HCC)    3. Type 2 diabetes mellitus with hyperglycemia, without long-term current use of insulin (HCC)  -     metFORMIN (Glucophage) 500 MG tablet; Take 1 tablet by mouth Daily With Breakfast.  Dispense: 30 tablet; Refill: 3    4. Hypertriglyceridemia  -     Lipid Panel    5. Gastroesophageal reflux disease with esophagitis and hemorrhage  -     omeprazole (priLOSEC) 40 MG capsule; Take 1 capsule by mouth 2 (Two) Times a Day.  Dispense: 60 capsule; Refill: 3    6. Immunization due  -     Pneumococcal Conjugate Vaccine 20-Valent (PCV20)    Other orders  -     aspirin-acetaminophen-caffeine (Excedrin Migraine) 250-250-65 MG per tablet; Take 1 tablet by mouth 2 (Two) Times a Day As Needed for Headache.  Dispense: 50 tablet; Refill: 0             I requested and reviewed all records, labs, and diagnostics from the hospital with patient and family.  Patient is to follow-up with specialists as discussed and directed.

## 2022-09-23 ENCOUNTER — TELEPHONE (OUTPATIENT)
Dept: FAMILY MEDICINE CLINIC | Facility: CLINIC | Age: 47
End: 2022-09-23

## 2022-09-23 NOTE — TELEPHONE ENCOUNTER
Let patient know omeprazole is the medicine for her stomach.  Also, the Excedrin Migraine is over-the-counter sticking at the from the store.

## 2022-09-23 NOTE — TELEPHONE ENCOUNTER
Caller: Smith Chisholm    Relationship: Self    Best call back number: [phone] (H)      PATIENT IS CHECKING IN ABOUT A MEDICATION FOR HIS STOMACH? AND A PRIOR AUTHORIZATION- WONDERING WHERE WE STAND ON THIS AND WHEN CAN HE EXPECT TO  THE MEDICATION.    ALSO:      aspirin-acetaminophen-caffeine (Excedrin Migraine) 250-250-65 MG per tablet      PATIENT STATES THAT HIS INSURANCE WILL NOT COVER THE MEDICATION ABOVE AND WONDERS ABOUT AN ALTERATIVE     PLEASE CALL AND ADVISE

## 2022-09-26 NOTE — TELEPHONE ENCOUNTER
Pt states asking if prior auth for med has been started yet    omeprazole (priLOSEC) 40 MG capsule    Pt states passport gave phone number:  [phone] for prior auth  details

## 2022-10-06 DIAGNOSIS — E78.2 HYPERLIPEMIA, MIXED: ICD-10-CM

## 2022-10-07 RX ORDER — ATORVASTATIN CALCIUM 20 MG/1
20 TABLET, FILM COATED ORAL DAILY
Qty: 30 TABLET | Refills: 0 | Status: SHIPPED | OUTPATIENT
Start: 2022-10-07 | End: 2022-12-12

## 2022-10-10 ENCOUNTER — OFFICE VISIT (OUTPATIENT)
Dept: GASTROENTEROLOGY | Facility: CLINIC | Age: 47
End: 2022-10-10

## 2022-10-10 VITALS
HEIGHT: 64 IN | TEMPERATURE: 97.6 F | HEART RATE: 97 BPM | WEIGHT: 161.6 LBS | OXYGEN SATURATION: 96 % | DIASTOLIC BLOOD PRESSURE: 82 MMHG | BODY MASS INDEX: 27.59 KG/M2 | SYSTOLIC BLOOD PRESSURE: 137 MMHG

## 2022-10-10 DIAGNOSIS — R10.11 RIGHT UPPER QUADRANT ABDOMINAL PAIN: Primary | ICD-10-CM

## 2022-10-10 PROCEDURE — 99214 OFFICE O/P EST MOD 30 MIN: CPT | Performed by: INTERNAL MEDICINE

## 2022-10-10 NOTE — PROGRESS NOTES
Chief Complaint   Patient presents with   • Hospital f/up     Small Bowel Obstruction   • Abdominal Pain     Right sided ADD pain currently     Subjective     HPI  Smith Chisholm is a 47 y.o. male who presents today for follow up.  He has chronic right upper quadrant pain and a myriad of tests have been done over the last 4 years which were all unrevealing.  Most recently MRCP showed pancreas disease of  He continues with pain daily, there has been a recommendation based on the fact that he has failed multiple treatments including tricyclic antidepressants, PPI, Carafate, antispasmodics, that he should see a biliary expert downtown to discuss biliary manometry  He is here to make that appointment    Objective   Vitals:    10/10/22 1333   BP: 137/82   Pulse: 97   Temp: 97.6 °F (36.4 °C)   SpO2: 96%       Physical Exam  Vitals reviewed.   Constitutional:       Appearance: He is well-developed.   HENT:      Head: Normocephalic and atraumatic.   Neurological:      Mental Status: He is alert and oriented to person, place, and time.   Psychiatric:         Behavior: Behavior normal.         Thought Content: Thought content normal.         Judgment: Judgment normal.       The following data was reviewed by: Francesco Chowdhury MD on 10/10/2022:  Common labs    Common Labs 2/26/22 2/26/22 9/17/22 9/17/22 9/17/22 9/18/22 9/18/22    1857 1857 1219 1219 1219 0729 0729   Glucose  120 (A)  256 (A)   145 (A)   BUN  11  11   8   Creatinine  0.87  0.92   0.95   eGFR Non  Am  95        eGFR African Am  115        Sodium  136  132 (A)   134 (A)   Potassium  3.8  3.7   4.2   Chloride  102  97 (A)   105   Calcium  9.5  8.5 (A)   8.0 (A)   Albumin  4.00  3.70      Total Bilirubin  0.5  0.4      Alkaline Phosphatase  121 (A)  96      AST (SGOT)  15  34      ALT (SGPT)  29  32      WBC 6.99  6.01   5.96    Hemoglobin 13.8  13.7   12.8 (A)    Hematocrit 42.5  42.7   38.9    Platelets 217  188   173    Hemoglobin A1C     7.70 (A)     (A)  Abnormal value       Comments are available for some flowsheets but are not being displayed.           CMP    CMP 2/26/22 9/17/22 9/18/22   Glucose 120 (A) 256 (A) 145 (A)   BUN 11 11 8   Creatinine 0.87 0.92 0.95   eGFR Non  Am 95     eGFR African Am 115     Sodium 136 132 (A) 134 (A)   Potassium 3.8 3.7 4.2   Chloride 102 97 (A) 105   Calcium 9.5 8.5 (A) 8.0 (A)   Albumin 4.00 3.70    Total Bilirubin 0.5 0.4    Alkaline Phosphatase 121 (A) 96    AST (SGOT) 15 34    ALT (SGPT) 29 32    (A) Abnormal value       Comments are available for some flowsheets but are not being displayed.           CBC    CBC 2/26/22 9/17/22 9/18/22   WBC 6.99 6.01 5.96   RBC 4.87 4.90 4.59   Hemoglobin 13.8 13.7 12.8 (A)   Hematocrit 42.5 42.7 38.9   MCV 87.3 87.1 84.7   MCH 28.3 28.0 27.9   MCHC 32.5 32.1 32.9   RDW 13.2 13.5 13.7   Platelets 217 188 173   (A) Abnormal value            CBC w/diff    CBC w/Diff 2/26/22 9/17/22 9/18/22   WBC 6.99 6.01 5.96   RBC 4.87 4.90 4.59   Hemoglobin 13.8 13.7 12.8 (A)   Hematocrit 42.5 42.7 38.9   MCV 87.3 87.1 84.7   MCH 28.3 28.0 27.9   MCHC 32.5 32.1 32.9   RDW 13.2 13.5 13.7   Platelets 217 188 173   Neutrophil Rel % 40.4 (A) 43.9 44.1   Immature Granulocyte Rel % 0.3 0.5    Lymphocyte Rel % 48.9 (A) 42.6 41.3   Monocyte Rel % 6.4 6.0 7.4   Eosinophil Rel % 3.4 6.7 (A) 6.4 (A)   Basophil Rel % 0.6 0.3 0.5   (A) Abnormal value            Lipid Panel    Lipid Panel 1/17/22   Total Cholesterol 234 (A)   Triglycerides 675 (A)   HDL Cholesterol 30 (A)   VLDL Cholesterol 112 (A)   LDL Cholesterol  92   (A) Abnormal value                And hospitalization with consult notes reviewed an MRI    Assessment & Plan   Assessment:       Pancreas divisum on MRI  Chronic right upper quadrant plan    Plan:   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 greenberg or Hannah I will put in a referral    I spent 35 minutes caring for Smith on this date of service. This time includes time spent by me in the following activities:preparing for the visit, reviewing tests, obtaining and/or reviewing a separately obtained history, performing a medically appropriate examination and/or evaluation , counseling and educating the patient/family/caregiver, ordering medications, tests, or procedures, referring and communicating with other health care professionals , documenting information in the medical record, independently interpreting results and communicating that information with the patient/family/caregiver and care coordination    Francesco Chowdhury MD  [facility], [place], operated by [facility]  [address] [place] [address]  Office: [phone]

## 2022-10-12 ENCOUNTER — TELEPHONE (OUTPATIENT)
Dept: GASTROENTEROLOGY | Facility: CLINIC | Age: 47
End: 2022-10-12

## 2022-11-18 ENCOUNTER — TELEPHONE (OUTPATIENT)
Dept: FAMILY MEDICINE CLINIC | Facility: CLINIC | Age: 47
End: 2022-11-18

## 2022-11-18 NOTE — TELEPHONE ENCOUNTER
Caller: Chisholm Smith    Relationship: Self    Best call back number: [phone]    What is the best time to reach you: ANY    Who are you requesting to speak with (clinical staff, provider,  specific staff member): DR.AKSHAYA WHITAKER    Do you know the name of the person who called:     What was the call regarding: PATIENT CALLED TO GET HIS 4 KIDS IN AS NEW PATIENTS WITH HIS PCP KILLIAN WHITAKER. I ADVISED PATIENT THAT HE IS NOT TAKING ANY NEW PATIENTS RIGHT NOW. HIS KIDS ARE 12,4,2 AND 1 YEARS OLD. HE STATED THAT HIM AND HIS WIFE HAVE BEEN PATIENTS OF [employer] FOR 5 YEARS AND THEY WANT THEIR WHOLE FAMILY TO SEE HIM. HE ALSO STATED THAT HE SPOKE TO  ABOUT SEEING HIS KIDS AND HE SAID HE WOULD. PLEASE CALL AND ADVISE PATIENT WHAT CAN BE DONE.    Do you require a callback: YES

## 2022-11-18 NOTE — TELEPHONE ENCOUNTER
Spoke with Magen and he stated he will see them just not all the same day, 2 at a time.  laddorothy has been informed

## 2022-12-08 ENCOUNTER — TELEMEDICINE (OUTPATIENT)
Dept: FAMILY MEDICINE CLINIC | Facility: CLINIC | Age: 47
End: 2022-12-08

## 2022-12-08 DIAGNOSIS — R07.9 CHEST PAIN, UNSPECIFIED TYPE: ICD-10-CM

## 2022-12-08 DIAGNOSIS — J02.9 SORE THROAT: Primary | ICD-10-CM

## 2022-12-08 PROCEDURE — 99213 OFFICE O/P EST LOW 20 MIN: CPT | Performed by: FAMILY MEDICINE

## 2022-12-08 NOTE — PROGRESS NOTES
"Chief Complaint  No chief complaint on file.  Sore throat  Subjective        Smith Chisholm presents to [facility]  History of Present Illness  Sore throat, patient agreed to be contacted by Asherimity, he is located at home, I am at the office  Sore throat x 3 days, with CP mid sternal, something cough, + fever 103, + flu and Covid vaccine, non smoker, no CAD  Objective   Vital Signs:  There were no vitals taken for this visit.  Estimated body mass index is 27.74 kg/m² as calculated from the following:    Height as of 10/10/22: 162.6 cm (64\").    Weight as of 10/10/22: 73.3 kg (161 lb 9.6 oz).            Result Review :                Assessment and Plan   Diagnoses and all orders for this visit:    1. Sore throat (Primary)  Comments:  advised to go to ER stat due to CP    2. Chest pain, unspecified type  Comments:  advisdd to go to the ER Stat             Follow Up   No follow-ups on file.  Patient was given instructions and counseling regarding his condition or for health maintenance advice. Please see specific information pulled into the AVS if appropriate.   time spent 15 minutes    "

## 2022-12-10 DIAGNOSIS — E78.2 HYPERLIPEMIA, MIXED: ICD-10-CM

## 2022-12-12 RX ORDER — ATORVASTATIN CALCIUM 20 MG/1
TABLET, FILM COATED ORAL
Qty: 30 TABLET | Refills: 0 | Status: SHIPPED | OUTPATIENT
Start: 2022-12-12 | End: 2023-01-10

## 2022-12-12 NOTE — TELEPHONE ENCOUNTER
Rx Refill Note  Requested Prescriptions     Pending Prescriptions Disp Refills   • atorvastatin (LIPITOR) 20 MG tablet [Pharmacy Med Name: ATORVASTATIN 20MG TABLETS] 30 tablet 0     Sig: TAKE 1 TABLET BY MOUTH EVERY DAY      Last office visit with prescribing clinician: 9/22/2022   Last telemedicine visit with prescribing clinician:    Next office visit with prescribing clinician: 12/22/2022                         Would you like a call back once the refill request has been completed: [] Yes [] No    If the office needs to give you a call back, can they leave a voicemail: [] Yes [] No    Derrek Arenas, BERNIE/LMR  12/12/22, 07:29 EST

## 2022-12-30 ENCOUNTER — OFFICE VISIT (OUTPATIENT)
Dept: FAMILY MEDICINE CLINIC | Facility: CLINIC | Age: 47
End: 2022-12-30

## 2022-12-30 VITALS
HEART RATE: 86 BPM | WEIGHT: 158.6 LBS | OXYGEN SATURATION: 98 % | TEMPERATURE: 98 F | BODY MASS INDEX: 27.08 KG/M2 | HEIGHT: 64 IN | DIASTOLIC BLOOD PRESSURE: 88 MMHG | SYSTOLIC BLOOD PRESSURE: 124 MMHG | RESPIRATION RATE: 16 BRPM

## 2022-12-30 DIAGNOSIS — E78.1 HYPERTRIGLYCERIDEMIA: ICD-10-CM

## 2022-12-30 DIAGNOSIS — E11.65 TYPE 2 DIABETES MELLITUS WITH HYPERGLYCEMIA, WITHOUT LONG-TERM CURRENT USE OF INSULIN: Primary | ICD-10-CM

## 2022-12-30 DIAGNOSIS — E55.9 VITAMIN D DEFICIENCY: ICD-10-CM

## 2022-12-30 DIAGNOSIS — B35.0 RINGWORM OF THE SCALP: ICD-10-CM

## 2022-12-30 PROBLEM — Z09 HOSPITAL DISCHARGE FOLLOW-UP: Status: RESOLVED | Noted: 2022-09-22 | Resolved: 2022-12-30

## 2022-12-30 PROBLEM — J02.9 ST (SORE THROAT): Status: RESOLVED | Noted: 2020-11-05 | Resolved: 2022-12-30

## 2022-12-30 PROBLEM — J02.9 SORE THROAT: Status: RESOLVED | Noted: 2022-12-08 | Resolved: 2022-12-30

## 2022-12-30 PROCEDURE — 99214 OFFICE O/P EST MOD 30 MIN: CPT | Performed by: FAMILY MEDICINE

## 2022-12-30 RX ORDER — CLOTRIMAZOLE 1 %
1 CREAM (GRAM) TOPICAL 2 TIMES DAILY
Qty: 28 G | Refills: 0 | Status: SHIPPED | OUTPATIENT
Start: 2022-12-30

## 2022-12-30 NOTE — PROGRESS NOTES
"Chief Complaint  Diabetes    Subjective        Smith Chisholm presents to [facility]  History of Present Illness  Pt is here for refills, labs and  DM- on med and needs refills and labs  HLD- on med and is active  Rash on head that is itchy   Objective   Vital Signs:  /88 (BP Location: Left arm, Patient Position: Sitting, Cuff Size: Large Adult)   Pulse 86   Temp 98 °F (36.7 °C) (Temporal)   Resp 16   Ht 162.6 cm (64\")   Wt 71.9 kg (158 lb 9.6 oz)   SpO2 98%   BMI 27.22 kg/m²   Estimated body mass index is 27.22 kg/m² as calculated from the following:    Height as of this encounter: 162.6 cm (64\").    Weight as of this encounter: 71.9 kg (158 lb 9.6 oz).    BMI is >= 25 and <30. (Overweight) The following options were offered after discussion;: weight loss educational material (shared in after visit summary) and exercise counseling/recommendations      Physical Exam  Vitals and nursing note reviewed.   Constitutional:       Appearance: Normal appearance. He is well-developed.   Cardiovascular:      Rate and Rhythm: Normal rate and regular rhythm.      Heart sounds: Normal heart sounds. No murmur heard.  Pulmonary:      Effort: Pulmonary effort is normal. No respiratory distress.      Breath sounds: Normal breath sounds. No stridor. No wheezing or rhonchi.   Skin:     Comments: Scalp ring worm multiple.   Neurological:      General: No focal deficit present.      Mental Status: He is alert and oriented to person, place, and time. He is not disoriented.   Psychiatric:         Mood and Affect: Mood normal.         Behavior: Behavior normal.        Result Review :                Assessment and Plan   Diagnoses and all orders for this visit:    1. Type 2 diabetes mellitus with hyperglycemia, without long-term current use of insulin (HCC) (Primary)  -     Hemoglobin A1c    2. Ringworm of the scalp  -     clotrimazole (LOTRIMIN) 1 % cream; Apply 1 application topically to the " appropriate area as directed 2 (Two) Times a Day.  Dispense: 28 g; Refill: 0    3. Hypertriglyceridemia  -     Comprehensive Metabolic Panel  -     Lipid Panel    4. Vitamin D deficiency             Follow Up   No follow-ups on file.  Patient was given instructions and counseling regarding his condition or for health maintenance advice. Please see specific information pulled into the AVS if appropriate.     Refills and labs today.  Work on diet and exercise.    Start lotrimin for ring worm.

## 2022-12-31 DIAGNOSIS — E11.65 TYPE 2 DIABETES MELLITUS WITH HYPERGLYCEMIA, WITHOUT LONG-TERM CURRENT USE OF INSULIN: ICD-10-CM

## 2022-12-31 LAB
ALBUMIN SERPL-MCNC: 4.3 G/DL (ref 3.5–5.2)
ALBUMIN/GLOB SERPL: 1.7 G/DL
ALP SERPL-CCNC: 75 U/L (ref 39–117)
ALT SERPL-CCNC: 19 U/L (ref 1–41)
AST SERPL-CCNC: 15 U/L (ref 1–40)
BILIRUB SERPL-MCNC: 0.3 MG/DL (ref 0–1.2)
BUN SERPL-MCNC: 15 MG/DL (ref 6–20)
BUN/CREAT SERPL: 12.8 (ref 7–25)
CALCIUM SERPL-MCNC: 9.1 MG/DL (ref 8.6–10.5)
CHLORIDE SERPL-SCNC: 103 MMOL/L (ref 98–107)
CHOLEST SERPL-MCNC: 167 MG/DL (ref 0–200)
CO2 SERPL-SCNC: 26 MMOL/L (ref 22–29)
CREAT SERPL-MCNC: 1.17 MG/DL (ref 0.76–1.27)
EGFRCR SERPLBLD CKD-EPI 2021: 78.3 ML/MIN/1.73
GLOBULIN SER CALC-MCNC: 2.6 GM/DL
GLUCOSE SERPL-MCNC: 116 MG/DL (ref 65–99)
HBA1C MFR BLD: 7.3 % (ref 4.8–5.6)
HDLC SERPL-MCNC: 41 MG/DL (ref 40–60)
LDLC SERPL CALC-MCNC: 81 MG/DL (ref 0–100)
POTASSIUM SERPL-SCNC: 4.6 MMOL/L (ref 3.5–5.2)
PROT SERPL-MCNC: 6.9 G/DL (ref 6–8.5)
SODIUM SERPL-SCNC: 138 MMOL/L (ref 136–145)
TRIGL SERPL-MCNC: 271 MG/DL (ref 0–150)
VLDLC SERPL CALC-MCNC: 45 MG/DL (ref 5–40)

## 2023-01-09 DIAGNOSIS — E11.65 TYPE 2 DIABETES MELLITUS WITH HYPERGLYCEMIA, WITHOUT LONG-TERM CURRENT USE OF INSULIN: ICD-10-CM

## 2023-01-10 DIAGNOSIS — E78.2 HYPERLIPEMIA, MIXED: ICD-10-CM

## 2023-01-10 RX ORDER — ATORVASTATIN CALCIUM 20 MG/1
TABLET, FILM COATED ORAL
Qty: 30 TABLET | Refills: 0 | Status: SHIPPED | OUTPATIENT
Start: 2023-01-10

## 2023-01-10 NOTE — TELEPHONE ENCOUNTER
Rx Refill Note  Requested Prescriptions     Pending Prescriptions Disp Refills   • atorvastatin (LIPITOR) 20 MG tablet [Pharmacy Med Name: ATORVASTATIN 20MG TABLETS] 30 tablet 0     Sig: TAKE 1 TABLET BY MOUTH EVERY DAY      Last office visit with prescribing clinician: 12/30/2022   Last telemedicine visit with prescribing clinician: Visit date not found   Next office visit with prescribing clinician: Visit date not found

## 2023-01-17 DIAGNOSIS — K21.01 GASTROESOPHAGEAL REFLUX DISEASE WITH ESOPHAGITIS AND HEMORRHAGE: ICD-10-CM

## 2023-01-17 RX ORDER — OMEPRAZOLE 40 MG/1
CAPSULE, DELAYED RELEASE ORAL
Qty: 60 CAPSULE | Refills: 3 | Status: SHIPPED | OUTPATIENT
Start: 2023-01-17

## 2023-03-10 DIAGNOSIS — E78.1 HYPERTRIGLYCERIDEMIA: ICD-10-CM

## 2023-03-10 RX ORDER — FENOFIBRATE 145 MG/1
145 TABLET, COATED ORAL DAILY
Qty: 30 TABLET | Refills: 5 | Status: SHIPPED | OUTPATIENT
Start: 2023-03-10

## 2023-09-01 ENCOUNTER — OFFICE VISIT (OUTPATIENT)
Dept: FAMILY MEDICINE CLINIC | Facility: CLINIC | Age: 48
End: 2023-09-01
Payer: COMMERCIAL

## 2023-09-01 VITALS
TEMPERATURE: 98.1 F | SYSTOLIC BLOOD PRESSURE: 122 MMHG | WEIGHT: 158.4 LBS | DIASTOLIC BLOOD PRESSURE: 62 MMHG | HEIGHT: 64 IN | OXYGEN SATURATION: 99 % | HEART RATE: 79 BPM | BODY MASS INDEX: 27.04 KG/M2

## 2023-09-01 DIAGNOSIS — E78.1 HYPERTRIGLYCERIDEMIA: ICD-10-CM

## 2023-09-01 DIAGNOSIS — R53.83 OTHER FATIGUE: ICD-10-CM

## 2023-09-01 DIAGNOSIS — E78.2 HYPERLIPEMIA, MIXED: ICD-10-CM

## 2023-09-01 DIAGNOSIS — R10.11 RUQ PAIN: ICD-10-CM

## 2023-09-01 DIAGNOSIS — E11.65 TYPE 2 DIABETES MELLITUS WITH HYPERGLYCEMIA, WITHOUT LONG-TERM CURRENT USE OF INSULIN: Primary | ICD-10-CM

## 2023-09-01 DIAGNOSIS — E55.9 VITAMIN D DEFICIENCY: ICD-10-CM

## 2023-09-01 DIAGNOSIS — K21.01 GASTROESOPHAGEAL REFLUX DISEASE WITH ESOPHAGITIS AND HEMORRHAGE: ICD-10-CM

## 2023-09-01 PROBLEM — J02.9 SORE THROAT: Status: RESOLVED | Noted: 2019-11-11 | Resolved: 2023-09-01

## 2023-09-01 PROBLEM — T18.9XXA FOREIGN BODY ALIMENTARY TRACT: Status: RESOLVED | Noted: 2020-06-04 | Resolved: 2023-09-01

## 2023-09-01 PROBLEM — R07.9 CHEST PAIN: Status: RESOLVED | Noted: 2022-12-08 | Resolved: 2023-09-01

## 2023-09-01 PROCEDURE — 99214 OFFICE O/P EST MOD 30 MIN: CPT | Performed by: FAMILY MEDICINE

## 2023-09-01 RX ORDER — OMEPRAZOLE 40 MG/1
40 CAPSULE, DELAYED RELEASE ORAL 2 TIMES DAILY
Qty: 60 CAPSULE | Refills: 5 | Status: SHIPPED | OUTPATIENT
Start: 2023-09-01

## 2023-09-01 RX ORDER — ATORVASTATIN CALCIUM 20 MG/1
20 TABLET, FILM COATED ORAL DAILY
Qty: 30 TABLET | Refills: 3 | Status: SHIPPED | OUTPATIENT
Start: 2023-09-01

## 2023-09-01 RX ORDER — FENOFIBRATE 145 MG/1
145 TABLET, COATED ORAL DAILY
Qty: 30 TABLET | Refills: 5 | Status: SHIPPED | OUTPATIENT
Start: 2023-09-01

## 2023-09-01 NOTE — PROGRESS NOTES
"Chief Complaint  DM    Subjective        Smith Chisholm presents to [facility]  History of Present Illness  Patient presents today for labs, refills, and  Diabetes- needs labs and not checking at home  Hyperlipidemia-on medication; active  He has been having RUQ pains and needs check on this. Some nausea but rare.    Objective   Vital Signs:  /62 (BP Location: Right arm, Patient Position: Sitting, Cuff Size: Adult)   Pulse 79   Temp 98.1 øF (36.7 øC) (Temporal)   Ht 162.6 cm (64.02\")   Wt 71.8 kg (158 lb 6.4 oz)   SpO2 99%   BMI 27.18 kg/mý   Estimated body mass index is 27.18 kg/mý as calculated from the following:    Height as of this encounter: 162.6 cm (64.02\").    Weight as of this encounter: 71.8 kg (158 lb 6.4 oz).             Physical Exam  Vitals and nursing note reviewed.   Constitutional:       Appearance: Normal appearance. He is well-developed.   HENT:      Head: Normocephalic and atraumatic.   Cardiovascular:      Rate and Rhythm: Normal rate and regular rhythm.      Heart sounds: Normal heart sounds. No murmur heard.  Pulmonary:      Effort: Pulmonary effort is normal. No respiratory distress.      Breath sounds: Normal breath sounds. No stridor. No wheezing or rhonchi.   Neurological:      General: No focal deficit present.      Mental Status: He is alert and oriented to person, place, and time. He is not disoriented.   Psychiatric:         Mood and Affect: Mood normal.         Behavior: Behavior normal.      Result Review :                   Assessment and Plan   Diagnoses and all orders for this visit:    1. Type 2 diabetes mellitus with hyperglycemia, without long-term current use of insulin (Primary)  -     Microalbumin / Creatinine Urine Ratio - Urine, Clean Catch  -     Hemoglobin A1c  -     metFORMIN (Glucophage) 500 MG tablet; Take 1 tablet by mouth 2 (Two) Times a Day With Meals.  Dispense: 60 tablet; Refill: 3    2. Vitamin D deficiency  -     " Vitamin D,25-Hydroxy    3. Hypertriglyceridemia  -     Comprehensive Metabolic Panel  -     Lipid Panel  -     fenofibrate (TRICOR) 145 MG tablet; Take 1 tablet by mouth Daily.  Dispense: 30 tablet; Refill: 5    4. Other fatigue  -     CBC & Differential    5. RUQ pain  -     US Abdomen Limited    6. Hyperlipemia, mixed  -     atorvastatin (LIPITOR) 20 MG tablet; Take 1 tablet by mouth Daily.  Dispense: 30 tablet; Refill: 3    7. Gastroesophageal reflux disease with esophagitis and hemorrhage  -     omeprazole (priLOSEC) 40 MG capsule; Take 1 capsule by mouth 2 (Two) Times a Day.  Dispense: 60 capsule; Refill: 5             Follow Up   No follow-ups on file.  Patient was given instructions and counseling regarding his condition or for health maintenance advice. Please see specific information pulled into the AVS if appropriate.     Labs, refills and work on diet and exercise.  Will get US.

## 2023-09-05 DIAGNOSIS — E11.65 TYPE 2 DIABETES MELLITUS WITH HYPERGLYCEMIA, WITHOUT LONG-TERM CURRENT USE OF INSULIN: Primary | ICD-10-CM

## 2023-09-05 DIAGNOSIS — E55.9 VITAMIN D DEFICIENCY: ICD-10-CM

## 2023-09-05 LAB
25(OH)D3+25(OH)D2 SERPL-MCNC: 12.5 NG/ML (ref 30–100)
ALBUMIN SERPL-MCNC: 4 G/DL (ref 3.5–5.2)
ALBUMIN/CREAT UR: 557 MG/G CREAT (ref 0–29)
ALBUMIN/GLOB SERPL: 1.3 G/DL
ALP SERPL-CCNC: 99 U/L (ref 39–117)
ALT SERPL-CCNC: 27 U/L (ref 1–41)
AST SERPL-CCNC: 19 U/L (ref 1–40)
BASOPHILS # BLD AUTO: 0.03 10*3/MM3 (ref 0–0.2)
BASOPHILS NFR BLD AUTO: 0.5 % (ref 0–1.5)
BILIRUB SERPL-MCNC: 0.5 MG/DL (ref 0–1.2)
BUN SERPL-MCNC: 9 MG/DL (ref 6–20)
BUN/CREAT SERPL: 11.1 (ref 7–25)
CALCIUM SERPL-MCNC: 9.5 MG/DL (ref 8.6–10.5)
CHLORIDE SERPL-SCNC: 98 MMOL/L (ref 98–107)
CHOLEST SERPL-MCNC: 609 MG/DL (ref 0–200)
CO2 SERPL-SCNC: 25.6 MMOL/L (ref 22–29)
CREAT SERPL-MCNC: 0.81 MG/DL (ref 0.76–1.27)
CREAT UR-MCNC: 80.6 MG/DL
EGFRCR SERPLBLD CKD-EPI 2021: 110.1 ML/MIN/1.73
EOSINOPHIL # BLD AUTO: 0.24 10*3/MM3 (ref 0–0.4)
EOSINOPHIL NFR BLD AUTO: 3.6 % (ref 0.3–6.2)
ERYTHROCYTE [DISTWIDTH] IN BLOOD BY AUTOMATED COUNT: 12.9 % (ref 12.3–15.4)
GLOBULIN SER CALC-MCNC: 3.1 GM/DL
GLUCOSE SERPL-MCNC: 109 MG/DL (ref 65–99)
HBA1C MFR BLD: 7.2 % (ref 4.8–5.6)
HCT VFR BLD AUTO: 41.5 % (ref 37.5–51)
HDLC SERPL-MCNC: 23 MG/DL (ref 40–60)
HGB BLD-MCNC: 14.2 G/DL (ref 13–17.7)
IMM GRANULOCYTES # BLD AUTO: 0.02 10*3/MM3 (ref 0–0.05)
IMM GRANULOCYTES NFR BLD AUTO: 0.3 % (ref 0–0.5)
LDLC SERPL CALC-MCNC: ABNORMAL MG/DL
LYMPHOCYTES # BLD AUTO: 3.3 10*3/MM3 (ref 0.7–3.1)
LYMPHOCYTES NFR BLD AUTO: 50.2 % (ref 19.6–45.3)
MCH RBC QN AUTO: 28.2 PG (ref 26.6–33)
MCHC RBC AUTO-ENTMCNC: 34.2 G/DL (ref 31.5–35.7)
MCV RBC AUTO: 82.3 FL (ref 79–97)
MICROALBUMIN UR-MCNC: 449 UG/ML
MONOCYTES # BLD AUTO: 0.46 10*3/MM3 (ref 0.1–0.9)
MONOCYTES NFR BLD AUTO: 7 % (ref 5–12)
NEUTROPHILS # BLD AUTO: 2.53 10*3/MM3 (ref 1.7–7)
NEUTROPHILS NFR BLD AUTO: 38.4 % (ref 42.7–76)
NRBC BLD AUTO-RTO: 0 /100 WBC (ref 0–0.2)
PLATELET # BLD AUTO: 225 10*3/MM3 (ref 140–450)
POTASSIUM SERPL-SCNC: 4.2 MMOL/L (ref 3.5–5.2)
PROT SERPL-MCNC: 7.1 G/DL (ref 6–8.5)
RBC # BLD AUTO: 5.04 10*6/MM3 (ref 4.14–5.8)
SODIUM SERPL-SCNC: 136 MMOL/L (ref 136–145)
TRIGL SERPL-MCNC: 1924 MG/DL (ref 0–150)
VLDLC SERPL CALC-MCNC: ABNORMAL MG/DL
WBC # BLD AUTO: 6.58 10*3/MM3 (ref 3.4–10.8)

## 2023-09-05 RX ORDER — ERGOCALCIFEROL 1.25 MG/1
50000 CAPSULE ORAL WEEKLY
Qty: 12 CAPSULE | Refills: 1 | Status: SHIPPED | OUTPATIENT
Start: 2023-09-05

## 2023-09-05 RX ORDER — LISINOPRIL 2.5 MG/1
2.5 TABLET ORAL DAILY
Qty: 30 TABLET | Refills: 5 | Status: SHIPPED | OUTPATIENT
Start: 2023-09-05

## 2023-09-15 ENCOUNTER — HOSPITAL ENCOUNTER (OUTPATIENT)
Dept: ULTRASOUND IMAGING | Facility: HOSPITAL | Age: 48
Discharge: HOME | End: 2023-09-15
Admitting: FAMILY MEDICINE
Payer: COMMERCIAL

## 2023-09-15 DIAGNOSIS — R10.11 RUQ PAIN: Primary | ICD-10-CM

## 2023-09-15 PROCEDURE — 76705 ECHO EXAM OF ABDOMEN: CPT

## 2023-09-29 ENCOUNTER — HOSPITAL ENCOUNTER (OUTPATIENT)
Dept: NUCLEAR MEDICINE | Facility: HOSPITAL | Age: 48
Discharge: HOME | End: 2023-09-29
Payer: COMMERCIAL

## 2023-09-29 ENCOUNTER — HOSPITAL ENCOUNTER (OUTPATIENT)
Dept: NUCLEAR MEDICINE | Facility: HOSPITAL | Age: 48
Discharge: HOME | End: 2023-09-29
Admitting: FAMILY MEDICINE
Payer: COMMERCIAL

## 2023-09-29 DIAGNOSIS — R10.11 RUQ PAIN: ICD-10-CM

## 2023-09-29 PROCEDURE — 0 TECHNETIUM TC 99M MEBROFENIN KIT: Performed by: FAMILY MEDICINE

## 2023-09-29 PROCEDURE — A9537 TC99M MEBROFENIN: HCPCS | Performed by: FAMILY MEDICINE

## 2023-09-29 PROCEDURE — 78227 HEPATOBIL SYST IMAGE W/DRUG: CPT

## 2023-09-29 PROCEDURE — 25010000002 SINCALIDE PER 5 MCG: Performed by: FAMILY MEDICINE

## 2023-09-29 RX ORDER — KIT FOR THE PREPARATION OF TECHNETIUM TC 99M MEBROFENIN 45 MG/10ML
1 INJECTION, POWDER, LYOPHILIZED, FOR SOLUTION INTRAVENOUS
Status: COMPLETED | OUTPATIENT
Start: 2023-09-29 | End: 2023-09-29

## 2023-09-29 RX ADMIN — MEBROFENIN 1 DOSE: 45 INJECTION, POWDER, LYOPHILIZED, FOR SOLUTION INTRAVENOUS at 07:40

## 2023-09-29 RX ADMIN — SODIUM CHLORIDE 1.4 MCG: 9 INJECTION, SOLUTION INTRAVENOUS at 08:51

## 2023-10-30 DIAGNOSIS — E78.2 HYPERLIPEMIA, MIXED: ICD-10-CM

## 2023-10-30 RX ORDER — ATORVASTATIN CALCIUM 20 MG/1
20 TABLET, FILM COATED ORAL DAILY
Qty: 90 TABLET | Refills: 0 | Status: SHIPPED | OUTPATIENT
Start: 2023-10-30

## 2023-11-30 ENCOUNTER — OFFICE VISIT (OUTPATIENT)
Dept: FAMILY MEDICINE CLINIC | Facility: CLINIC | Age: 48
End: 2023-11-30
Payer: COMMERCIAL

## 2023-11-30 VITALS
TEMPERATURE: 97.5 F | RESPIRATION RATE: 18 BRPM | OXYGEN SATURATION: 97 % | DIASTOLIC BLOOD PRESSURE: 84 MMHG | HEART RATE: 99 BPM | WEIGHT: 158.6 LBS | SYSTOLIC BLOOD PRESSURE: 122 MMHG | HEIGHT: 64 IN | BODY MASS INDEX: 27.08 KG/M2

## 2023-11-30 DIAGNOSIS — K08.89 TOOTH PAIN: ICD-10-CM

## 2023-11-30 DIAGNOSIS — K08.89 TOOTH LOOSE: ICD-10-CM

## 2023-11-30 DIAGNOSIS — R21 RASH: Primary | ICD-10-CM

## 2023-11-30 PROCEDURE — 1160F RVW MEDS BY RX/DR IN RCRD: CPT | Performed by: FAMILY MEDICINE

## 2023-11-30 PROCEDURE — 1159F MED LIST DOCD IN RCRD: CPT | Performed by: FAMILY MEDICINE

## 2023-11-30 PROCEDURE — 3051F HG A1C>EQUAL 7.0%<8.0%: CPT | Performed by: FAMILY MEDICINE

## 2023-11-30 PROCEDURE — 99213 OFFICE O/P EST LOW 20 MIN: CPT | Performed by: FAMILY MEDICINE

## 2023-11-30 RX ORDER — CLOTRIMAZOLE AND BETAMETHASONE DIPROPIONATE 10; .64 MG/G; MG/G
1 CREAM TOPICAL 2 TIMES DAILY
Qty: 45 G | Refills: 2 | Status: SHIPPED | OUTPATIENT
Start: 2023-11-30

## 2023-11-30 NOTE — PROGRESS NOTES
"Chief Complaint  OTHER (R loose tooth. Would like ref to dentist) and Rash (Has a few spot on his scalp)    Subjective        Smith Chisholm presents to [facility]  Rash      Pt presents today for check up on a loose tooth for past 2-3 weeks.    On top of scalp he has been having rash that is very itchy and issues  Objective   Vital Signs:  /84 (BP Location: Left arm, Patient Position: Sitting, Cuff Size: Adult)   Pulse 99   Temp 97.5 °F (36.4 °C) (Tympanic)   Resp 18   Ht 162.6 cm (64.02\")   Wt 71.9 kg (158 lb 9.6 oz)   SpO2 97%   BMI 27.21 kg/m²   Estimated body mass index is 27.21 kg/m² as calculated from the following:    Height as of this encounter: 162.6 cm (64.02\").    Weight as of this encounter: 71.9 kg (158 lb 9.6 oz).               Physical Exam  Constitutional:       Appearance: Normal appearance.   HENT:      Nose:      Comments: Loose canine tooth left lower   Cardiovascular:      Rate and Rhythm: Normal rate.   Pulmonary:      Effort: Pulmonary effort is normal.   Neurological:      General: No focal deficit present.      Mental Status: He is alert and oriented to person, place, and time.        Result Review :                   Assessment and Plan   There are no diagnoses linked to this encounter.         Follow Up   No follow-ups on file.  Patient was given instructions and counseling regarding his condition or for health maintenance advice. Please see specific information pulled into the AVS if appropriate.       Start lotrisome cream for rash.    Follow up with dentist  "

## 2023-12-08 ENCOUNTER — OFFICE VISIT (OUTPATIENT)
Dept: FAMILY MEDICINE CLINIC | Facility: CLINIC | Age: 48
End: 2023-12-08
Payer: COMMERCIAL

## 2023-12-08 VITALS
WEIGHT: 158.9 LBS | RESPIRATION RATE: 18 BRPM | BODY MASS INDEX: 27.13 KG/M2 | DIASTOLIC BLOOD PRESSURE: 80 MMHG | OXYGEN SATURATION: 97 % | SYSTOLIC BLOOD PRESSURE: 118 MMHG | TEMPERATURE: 98 F | HEART RATE: 82 BPM | HEIGHT: 64 IN

## 2023-12-08 DIAGNOSIS — E78.1 HYPERTRIGLYCERIDEMIA: ICD-10-CM

## 2023-12-08 DIAGNOSIS — R06.02 SOB (SHORTNESS OF BREATH): ICD-10-CM

## 2023-12-08 DIAGNOSIS — R10.11 RUQ PAIN: ICD-10-CM

## 2023-12-08 DIAGNOSIS — E78.2 HYPERLIPEMIA, MIXED: ICD-10-CM

## 2023-12-08 DIAGNOSIS — E11.65 TYPE 2 DIABETES MELLITUS WITH HYPERGLYCEMIA, WITHOUT LONG-TERM CURRENT USE OF INSULIN: Primary | ICD-10-CM

## 2023-12-08 PROCEDURE — 93000 ELECTROCARDIOGRAM COMPLETE: CPT | Performed by: FAMILY MEDICINE

## 2023-12-08 PROCEDURE — 3051F HG A1C>EQUAL 7.0%<8.0%: CPT | Performed by: FAMILY MEDICINE

## 2023-12-08 PROCEDURE — 99214 OFFICE O/P EST MOD 30 MIN: CPT | Performed by: FAMILY MEDICINE

## 2023-12-08 PROCEDURE — 1159F MED LIST DOCD IN RCRD: CPT | Performed by: FAMILY MEDICINE

## 2023-12-08 PROCEDURE — 1160F RVW MEDS BY RX/DR IN RCRD: CPT | Performed by: FAMILY MEDICINE

## 2023-12-08 RX ORDER — FENOFIBRATE 145 MG/1
145 TABLET, COATED ORAL DAILY
Qty: 90 TABLET | Refills: 1 | Status: SHIPPED | OUTPATIENT
Start: 2023-12-08

## 2023-12-08 RX ORDER — ATORVASTATIN CALCIUM 20 MG/1
20 TABLET, FILM COATED ORAL DAILY
Qty: 90 TABLET | Refills: 1 | Status: SHIPPED | OUTPATIENT
Start: 2023-12-08

## 2023-12-08 RX ORDER — ALBUTEROL SULFATE 90 UG/1
2 AEROSOL, METERED RESPIRATORY (INHALATION) EVERY 4 HOURS PRN
Qty: 18 G | Refills: 2 | Status: SHIPPED | OUTPATIENT
Start: 2023-12-08

## 2023-12-08 NOTE — PROGRESS NOTES
"Chief Complaint  Diabetes and Follow-up    Subjective        Smith Chisholm presents to [facility]  Diabetes      Pt presents today for follow up and  He has been having 8 day hx of RUQ pain that is worse after eating.  He has no hx of gallbladder issues.  He had a negative Hida scan and US GB 3 months ago. May need to see   He has also been having some SOB during this time as well when he is laying down.  No cough or cold symptoms.      Objective   Vital Signs:  /80 (BP Location: Left arm, Patient Position: Sitting, Cuff Size: Large Adult)   Pulse 82   Temp 98 °F (36.7 °C) (Tympanic)   Resp 18   Ht 162.6 cm (64.02\")   Wt 72.1 kg (158 lb 14.4 oz)   SpO2 97%   BMI 27.26 kg/m²   Estimated body mass index is 27.26 kg/m² as calculated from the following:    Height as of this encounter: 162.6 cm (64.02\").    Weight as of this encounter: 72.1 kg (158 lb 14.4 oz).               Physical Exam  Vitals and nursing note reviewed.   Constitutional:       Appearance: Normal appearance. He is well-developed.   Cardiovascular:      Rate and Rhythm: Normal rate and regular rhythm.      Heart sounds: Normal heart sounds. No murmur heard.  Pulmonary:      Effort: Pulmonary effort is normal. No respiratory distress.      Breath sounds: Normal breath sounds. No stridor. No wheezing or rhonchi.   Abdominal:      General: Bowel sounds are normal.      Palpations: Abdomen is soft.      Tenderness: There is abdominal tenderness. There is no guarding or rebound.      Comments: RUQ TTP   Neurological:      General: No focal deficit present.      Mental Status: He is alert and oriented to person, place, and time. He is not disoriented.   Psychiatric:         Mood and Affect: Mood normal.         Behavior: Behavior normal.        Result Review :              ECG 12 Lead    Date/Time: 12/8/2023 8:27 AM  Performed by: Roseann Us MD    Authorized by: Roseann Us MD  Comparison: not " compared with previous ECG   Rhythm: sinus rhythm  Rate: normal  Conduction: conduction normal  ST Segments: ST segments normal  T Waves: T waves normal  QRS axis: normal    Clinical impression: normal ECG            Assessment and Plan   Diagnoses and all orders for this visit:    1. Type 2 diabetes mellitus with hyperglycemia, without long-term current use of insulin (Primary)  -     Hemoglobin A1c    2. Hypertriglyceridemia  -     Comprehensive Metabolic Panel  -     Lipid Panel  -     fenofibrate (TRICOR) 145 MG tablet; Take 1 tablet by mouth Daily.  Dispense: 90 tablet; Refill: 1    3. RUQ pain  -     Comprehensive Metabolic Panel    4. Hyperlipemia, mixed  -     atorvastatin (LIPITOR) 20 MG tablet; Take 1 tablet by mouth Daily.  Dispense: 90 tablet; Refill: 1    5. SOB (shortness of breath)  -     albuterol sulfate  (90 Base) MCG/ACT inhaler; Inhale 2 puffs Every 4 (Four) Hours As Needed for Wheezing.  Dispense: 18 g; Refill: 2  -     ECG 12 Lead             Follow Up   No follow-ups on file.  Patient was given instructions and counseling regarding his condition or for health maintenance advice. Please see specific information pulled into the AVS if appropriate.       Consider gastro referral once labs come back.    Refills, labs and work on diet and exercise.

## 2023-12-11 LAB
ALBUMIN SERPL-MCNC: 4.4 G/DL (ref 3.5–5.2)
ALBUMIN/GLOB SERPL: 1.7 G/DL
ALP SERPL-CCNC: 121 U/L (ref 39–117)
ALT SERPL-CCNC: 25 U/L (ref 1–41)
AST SERPL-CCNC: 18 U/L (ref 1–40)
BILIRUB SERPL-MCNC: 0.9 MG/DL (ref 0–1.2)
BUN SERPL-MCNC: 11 MG/DL (ref 6–20)
BUN/CREAT SERPL: 11.5 (ref 7–25)
CALCIUM SERPL-MCNC: 9.5 MG/DL (ref 8.6–10.5)
CHLORIDE SERPL-SCNC: 100 MMOL/L (ref 98–107)
CHOLEST SERPL-MCNC: 312 MG/DL (ref 0–200)
CO2 SERPL-SCNC: 26.9 MMOL/L (ref 22–29)
CREAT SERPL-MCNC: 0.96 MG/DL (ref 0.76–1.27)
EGFRCR SERPLBLD CKD-EPI 2021: 98.7 ML/MIN/1.73
GLOBULIN SER CALC-MCNC: 2.6 GM/DL
GLUCOSE SERPL-MCNC: 120 MG/DL (ref 65–99)
HBA1C MFR BLD: 6.8 % (ref 4.8–5.6)
HDLC SERPL-MCNC: 31 MG/DL (ref 40–60)
LDLC SERPL CALC-MCNC: ABNORMAL MG/DL
POTASSIUM SERPL-SCNC: 4.3 MMOL/L (ref 3.5–5.2)
PROT SERPL-MCNC: 7 G/DL (ref 6–8.5)
SODIUM SERPL-SCNC: 136 MMOL/L (ref 136–145)
TRIGL SERPL-MCNC: 823 MG/DL (ref 0–150)
VLDLC SERPL CALC-MCNC: ABNORMAL MG/DL

## 2023-12-13 DIAGNOSIS — E11.65 TYPE 2 DIABETES MELLITUS WITH HYPERGLYCEMIA, WITHOUT LONG-TERM CURRENT USE OF INSULIN: ICD-10-CM

## 2023-12-13 NOTE — TELEPHONE ENCOUNTER
Caller: Smith Chisholm    Relationship: Self    Best call back number: [phone]    Requested Prescriptions:   Requested Prescriptions      No prescriptions requested or ordered in this encounter        Pharmacy where request should be sent: [id number] [address] [facility] [phone] [facility] [phone]      Last office visit with prescribing clinician: 12/8/2023   Last telemedicine visit with prescribing clinician: Visit date not found   Next office visit with prescribing clinician: 3/8/2024     Additional details provided by patient: PATIENT DOES NOT KNOW THE NAME OF THE MEDICATION, BUT IT IS FOR DIABETES. PLEASE SEND THIS OVER ASAP.     Does the patient have less than a 3 day supply:  [x] Yes  [] No    Would you like a call back once the refill request has been completed: [] Yes [x] No    If the office needs to give you a call back, can they leave a voicemail: [] Yes [x] No    Brenton Crump Rep   12/13/23 10:22 EST

## 2024-01-02 DIAGNOSIS — E55.9 VITAMIN D DEFICIENCY: Primary | ICD-10-CM

## 2024-01-02 DIAGNOSIS — E11.65 TYPE 2 DIABETES MELLITUS WITH HYPERGLYCEMIA, WITHOUT LONG-TERM CURRENT USE OF INSULIN: ICD-10-CM

## 2024-01-02 DIAGNOSIS — E78.2 HYPERLIPEMIA, MIXED: ICD-10-CM

## 2024-01-02 DIAGNOSIS — E55.9 VITAMIN D DEFICIENCY: ICD-10-CM

## 2024-01-02 DIAGNOSIS — E78.1 HYPERTRIGLYCERIDEMIA: ICD-10-CM

## 2024-01-02 RX ORDER — ERGOCALCIFEROL 1.25 MG/1
50000 CAPSULE ORAL WEEKLY
Qty: 12 CAPSULE | Refills: 1 | Status: SHIPPED | OUTPATIENT
Start: 2024-01-02

## 2024-01-02 RX ORDER — ATORVASTATIN CALCIUM 20 MG/1
20 TABLET, FILM COATED ORAL DAILY
Qty: 90 TABLET | Refills: 3 | Status: SHIPPED | OUTPATIENT
Start: 2024-01-02

## 2024-01-02 RX ORDER — FENOFIBRATE 145 MG/1
145 TABLET, COATED ORAL DAILY
Qty: 30 TABLET | Refills: 3 | Status: SHIPPED | OUTPATIENT
Start: 2024-01-02

## 2024-01-02 RX ORDER — LISINOPRIL 2.5 MG/1
2.5 TABLET ORAL DAILY
Qty: 30 TABLET | Refills: 5 | Status: SHIPPED | OUTPATIENT
Start: 2024-01-02

## 2024-01-09 DIAGNOSIS — E11.65 TYPE 2 DIABETES MELLITUS WITH HYPERGLYCEMIA, WITHOUT LONG-TERM CURRENT USE OF INSULIN: ICD-10-CM

## 2024-01-29 ENCOUNTER — TELEMEDICINE (OUTPATIENT)
Dept: FAMILY MEDICINE CLINIC | Facility: CLINIC | Age: 49
End: 2024-01-29
Payer: COMMERCIAL

## 2024-01-29 DIAGNOSIS — R05.1 ACUTE COUGH: Primary | ICD-10-CM

## 2024-01-29 PROCEDURE — 99213 OFFICE O/P EST LOW 20 MIN: CPT | Performed by: FAMILY MEDICINE

## 2024-01-29 RX ORDER — BENZONATATE 200 MG/1
200 CAPSULE ORAL 3 TIMES DAILY PRN
Qty: 30 CAPSULE | Refills: 0 | Status: SHIPPED | OUTPATIENT
Start: 2024-01-29

## 2024-01-29 NOTE — PROGRESS NOTES
CHIEF COMPLAINT:cough for 2 weeks    Subjective   Smith Chisholm is a 49 y.o. male.     History of Present Illness   Patient presents during the Covid-19 pandemic/federally declared [facility] of public health emergency.  This service was conducted via video visit  PT is at home and I am at my desk at my office.    Pt presents for a 2 week hx of cough that is not getting better.  No fever or chills.  No SOB or wheezing.  No runny nose or congestion.      The following portions of the patient's history were reviewed and updated as appropriate: allergies, current medications, past family history, past medical history, past social history, past surgical history and problem list.    Review of Systems    Patient Active Problem List   Diagnosis    Nephrolithiasis    Hypertriglyceridemia    Eczema    Acute seasonal allergic rhinitis due to pollen    Bilateral chronic serous otitis media    Generalized abdominal pain    Primary insomnia    Migraine without aura and without status migrainosus, not intractable    Chronic gastric ulcer without hemorrhage and without perforation    Vitamin D deficiency    Lower abdominal pain    GERD without esophagitis    Renal cyst    Fatty liver    Type 2 diabetes mellitus with hyperglycemia, without long-term current use of insulin    Other proteinuria    Partial small bowel obstruction    Other headache syndrome    RUQ pain    Tooth loose    Tooth pain       No Known Allergies      Current Outpatient Medications:     albuterol sulfate  (90 Base) MCG/ACT inhaler, Inhale 2 puffs Every 4 (Four) Hours As Needed for Wheezing., Disp: 18 g, Rfl: 2    aspirin-acetaminophen-caffeine (Excedrin Migraine) 250-250-65 MG per tablet, Take 1 tablet by mouth 2 (Two) Times a Day As Needed for Headache., Disp: 50 tablet, Rfl: 0    atorvastatin (LIPITOR) 20 MG tablet, TAKE 1 TABLET BY MOUTH DAILY, Disp: 90 tablet, Rfl: 3    benzonatate (TESSALON) 200 MG capsule, Take 1 capsule by mouth 3 (Three) Times a  Day As Needed for Cough., Disp: 30 capsule, Rfl: 0    clotrimazole (LOTRIMIN) 1 % cream, Apply 1 application topically to the appropriate area as directed 2 (Two) Times a Day., Disp: 28 g, Rfl: 0    clotrimazole-betamethasone (Lotrisone) 1-0.05 % cream, Apply 1 application  topically to the appropriate area as directed 2 (Two) Times a Day., Disp: 45 g, Rfl: 2    fenofibrate (TRICOR) 145 MG tablet, TAKE 1 TABLET BY MOUTH DAILY, Disp: 30 tablet, Rfl: 3    lisinopril (PRINIVIL,ZESTRIL) 2.5 MG tablet, TAKE 1 TABLET BY MOUTH DAILY, Disp: 30 tablet, Rfl: 5    metFORMIN (GLUCOPHAGE) 500 MG tablet, TAKE 1 TABLET BY MOUTH TWICE DAILY WITH MEALS, Disp: 180 tablet, Rfl: 3    omeprazole (priLOSEC) 40 MG capsule, Take 1 capsule by mouth 2 (Two) Times a Day., Disp: 60 capsule, Rfl: 5    vitamin D (ERGOCALCIFEROL) 1.25 MG (91988 UT) capsule capsule, TAKE 1 CAPSULE BY MOUTH 1 TIME EVERY WEEK, Disp: 12 capsule, Rfl: 1    vitamin D (ERGOCALCIFEROL) 1.25 MG (87536 UT) capsule capsule, TAKE 1 CAPSULE BY MOUTH 1 TIME EVERY WEEK, Disp: 12 capsule, Rfl: 1    Past Medical History:   Diagnosis Date    Abdominal pain     Acute seasonal allergic rhinitis due to pollen     Diabetes mellitus     Eczema     Hypertriglyceridemia     Nephrolithiasis     Paresthesia of left leg     Small bowel obstruction        Past Surgical History:   Procedure Laterality Date    APPENDECTOMY      COLONOSCOPY N/A 1/14/2021    Procedure: COLONOSCOPY to cecum and TI with cold polypectomy;  Surgeon: Francesco Chowdhury MD;  Location: [facility] ENDOSCOPY;  Service: Gastroenterology;  Laterality: N/A;  pre: abdominal pain and rectal bleeding  post: internal hemorrhoids, normal TI, polyp    ENDOSCOPY N/A 1/14/2021    Procedure: ESOPHAGOGASTRODUODENOSCOPY with cold biopsies;  Surgeon: Francesco Chowdhury MD;  Location: [facility] ENDOSCOPY;  Service: Gastroenterology;  Laterality: N/A;  pre: h/o ulcers  post: ulcers healed, gastritis       Family History   Problem Relation Age of  Onset    No Known Problems Mother        Social History     Tobacco Use    Smoking status: Never    Smokeless tobacco: Never   Substance Use Topics    Alcohol use: No            Objective     There were no vitals taken for this visit. Video Visit    Physical Exam  NAD  AAOx3  No labored breathing.      Lab Results   Component Value Date    GLUCOSE 120 (H) 12/08/2023    BUN 11 12/08/2023    CREATININE 0.96 12/08/2023    EGFRIFNONA 95 02/26/2022    EGFRIFAFRI 115 02/26/2022    BCR 11.5 12/08/2023    K 4.3 12/08/2023    CO2 26.9 12/08/2023    CALCIUM 9.5 12/08/2023    PROTENTOTREF 7.0 12/08/2023    ALBUMIN 4.4 12/08/2023    LABIL2 1.7 12/08/2023    AST 18 12/08/2023    ALT 25 12/08/2023       WBC   Date Value Ref Range Status   12/26/2023 10.20 4.5 - 11.0 10*3/uL Final     RBC   Date Value Ref Range Status   12/26/2023 4.43 (L) 4.5 - 5.9 10*6/uL Final     Hemoglobin   Date Value Ref Range Status   12/26/2023 12.5 (L) 13.5 - 17.5 g/dL Final     Hematocrit   Date Value Ref Range Status   12/26/2023 39.3 (L) 41.0 - 53.0 % Final     MCV   Date Value Ref Range Status   12/26/2023 88.7 80.0 - 100.0 fL Final     MCH   Date Value Ref Range Status   12/26/2023 28.2 26.0 - 34.0 pg Final     MCHC   Date Value Ref Range Status   12/26/2023 31.8 31.0 - 37.0 g/dL Final     RDW   Date Value Ref Range Status   12/26/2023 12.6 12.0 - 16.8 % Final     RDW-SD   Date Value Ref Range Status   09/18/2022 42.4 37.0 - 54.0 fl Final     MPV   Date Value Ref Range Status   12/26/2023 10.0 8.4 - 12.4 fL Final     Platelets   Date Value Ref Range Status   12/26/2023 240 140 - 440 10*3/uL Final     Neutrophil Rel %   Date Value Ref Range Status   12/26/2023 66.5 45 - 80 % Final     Lymphocyte Rel %   Date Value Ref Range Status   12/26/2023 27.2 15 - 50 % Final     Monocyte Rel %   Date Value Ref Range Status   12/26/2023 3.9 0 - 15 % Final     Eosinophil %   Date Value Ref Range Status   12/26/2023 1.6 0 - 7 % Final     Basophil Rel %   Date  "Value Ref Range Status   12/26/2023 0.4 0 - 2 % Final     Immature Grans %   Date Value Ref Range Status   12/26/2023 0.4 0.0 - 1.0 % Final     Neutrophils Absolute   Date Value Ref Range Status   12/26/2023 6.79 2.0 - 8.8 10*3/uL Final     Lymphocytes Absolute   Date Value Ref Range Status   12/26/2023 2.77 0.7 - 5.5 10*3/uL Final     Monocytes Absolute   Date Value Ref Range Status   12/26/2023 0.40 0.0 - 1.7 10*3/uL Final     Eosinophils Absolute   Date Value Ref Range Status   12/26/2023 0.16 0.0 - 0.8 10*3/uL Final     Basophils Absolute   Date Value Ref Range Status   12/26/2023 0.04 0.0 - 0.2 10*3/uL Final     Immature Grans, Absolute   Date Value Ref Range Status   12/26/2023 0.04 0.00 - 0.10 10*3/uL Final     nRBC   Date Value Ref Range Status   12/26/2023 0 0 /100(WBC) Final   09/17/2022 0.0 0.0 - 0.2 /100 WBC Final       Lab Results   Component Value Date    HGBA1C 6.5 (H) 12/26/2023       Lab Results   Component Value Date    [id number] 414 08/21/2020       TSH   Date Value Ref Range Status   08/21/2020 2.520 0.270 - 4.200 uIU/mL Final       No results found for: \"CHOL\"  Lab Results   Component Value Date    TRIG 823 (H) 12/08/2023     Lab Results   Component Value Date    HDL 31 (L) 12/08/2023     Lab Results   Component Value Date    LDL Comment 12/08/2023     Lab Results   Component Value Date    VLDL Comment 12/08/2023     No results found for: \"LDLHDL\"      Procedures    Assessment & Plan   Problems Addressed this Visit    None  Visit Diagnoses       Acute cough    -  Primary    Relevant Medications    benzonatate (TESSALON) 200 MG capsule          Diagnoses         Codes Comments    Acute cough    -  Primary ICD-10-CM: R05.1  ICD-9-CM: 786.2             No orders of the defined types were placed in this encounter.      Current Outpatient Medications   Medication Sig Dispense Refill    albuterol sulfate  (90 Base) MCG/ACT inhaler Inhale 2 puffs Every 4 (Four) Hours As Needed for Wheezing. 18 " g 2    aspirin-acetaminophen-caffeine (Excedrin Migraine) 250-250-65 MG per tablet Take 1 tablet by mouth 2 (Two) Times a Day As Needed for Headache. 50 tablet 0    atorvastatin (LIPITOR) 20 MG tablet TAKE 1 TABLET BY MOUTH DAILY 90 tablet 3    benzonatate (TESSALON) 200 MG capsule Take 1 capsule by mouth 3 (Three) Times a Day As Needed for Cough. 30 capsule 0    clotrimazole (LOTRIMIN) 1 % cream Apply 1 application topically to the appropriate area as directed 2 (Two) Times a Day. 28 g 0    clotrimazole-betamethasone (Lotrisone) 1-0.05 % cream Apply 1 application  topically to the appropriate area as directed 2 (Two) Times a Day. 45 g 2    fenofibrate (TRICOR) 145 MG tablet TAKE 1 TABLET BY MOUTH DAILY 30 tablet 3    lisinopril (PRINIVIL,ZESTRIL) 2.5 MG tablet TAKE 1 TABLET BY MOUTH DAILY 30 tablet 5    metFORMIN (GLUCOPHAGE) 500 MG tablet TAKE 1 TABLET BY MOUTH TWICE DAILY WITH MEALS 180 tablet 3    omeprazole (priLOSEC) 40 MG capsule Take 1 capsule by mouth 2 (Two) Times a Day. 60 capsule 5    vitamin D (ERGOCALCIFEROL) 1.25 MG (15531 UT) capsule capsule TAKE 1 CAPSULE BY MOUTH 1 TIME EVERY WEEK 12 capsule 1    vitamin D (ERGOCALCIFEROL) 1.25 MG (78361 UT) capsule capsule TAKE 1 CAPSULE BY MOUTH 1 TIME EVERY WEEK 12 capsule 1     No current facility-administered medications for this visit.       No follow-ups on file.    There are no Patient Instructions on file for this visit.         Time spent on visit:  15   minutes.  This patient has consented to a telehealth visit via video. The visit was scheduled as a video visit to comply with patient safety concerns in accordance with CDC recommendations.  All vitals recorded within this visit are reported by the patient.      Symptomatic treatment and otc meds and fluids and rest.  Follow up if no better.   Start tessalon perles. If develop SOB, go to ER.

## 2024-02-06 ENCOUNTER — TELEPHONE (OUTPATIENT)
Dept: FAMILY MEDICINE CLINIC | Facility: CLINIC | Age: 49
End: 2024-02-06

## 2024-02-06 NOTE — TELEPHONE ENCOUNTER
Caller: Smith Chisholm    Relationship: Self    Best call back number: [phone]     What medication are you requesting: ANY    What are your current symptoms: COUGH    Have you had these symptoms before:    [x] Yes  [] No    Have you been treated for these symptoms before:   [x] Yes  [] No    If a prescription is needed, what is your preferred pharmacy and phone number: [id number] [place], [place] - [address] AT [facility] [phone] [facility] [phone]      Additional notes: PATIENT STATES THE MEDICATION THAT WAS PRESCRIBED FOR HIS COUGH IS NOT WORKING. PATIENT IS REQUESTING A DIFFERENT MEDICATION BE PRESCRIBED.

## 2024-02-07 RX ORDER — DEXTROMETHORPHAN HYDROBROMIDE AND PROMETHAZINE HYDROCHLORIDE 15; 6.25 MG/5ML; MG/5ML
2.5 SYRUP ORAL 4 TIMES DAILY PRN
Qty: 118 ML | Refills: 0 | Status: SHIPPED | OUTPATIENT
Start: 2024-02-07

## 2024-02-15 ENCOUNTER — TELEMEDICINE (OUTPATIENT)
Dept: FAMILY MEDICINE CLINIC | Facility: CLINIC | Age: 49
End: 2024-02-15
Payer: COMMERCIAL

## 2024-02-15 DIAGNOSIS — R05.2 SUBACUTE COUGH: ICD-10-CM

## 2024-02-15 DIAGNOSIS — J06.9 UPPER RESPIRATORY TRACT INFECTION, UNSPECIFIED TYPE: Primary | ICD-10-CM

## 2024-02-15 RX ORDER — PREDNISONE 20 MG/1
TABLET ORAL
Qty: 18 TABLET | Refills: 0 | Status: SHIPPED | OUTPATIENT
Start: 2024-02-15 | End: 2024-02-24

## 2024-02-15 RX ORDER — AMOXICILLIN AND CLAVULANATE POTASSIUM 875; 125 MG/1; MG/1
1 TABLET, FILM COATED ORAL 2 TIMES DAILY
Qty: 20 TABLET | Refills: 0 | Status: SHIPPED | OUTPATIENT
Start: 2024-02-15 | End: 2024-02-25

## 2024-02-15 RX ORDER — DEXTROMETHORPHAN HYDROBROMIDE AND PROMETHAZINE HYDROCHLORIDE 15; 6.25 MG/5ML; MG/5ML
2.5 SYRUP ORAL 4 TIMES DAILY PRN
Qty: 118 ML | Refills: 0 | Status: SHIPPED | OUTPATIENT
Start: 2024-02-15

## 2024-03-08 ENCOUNTER — OFFICE VISIT (OUTPATIENT)
Dept: FAMILY MEDICINE CLINIC | Facility: CLINIC | Age: 49
End: 2024-03-08
Payer: COMMERCIAL

## 2024-03-08 VITALS
HEART RATE: 84 BPM | BODY MASS INDEX: 26.53 KG/M2 | HEIGHT: 64 IN | OXYGEN SATURATION: 95 % | TEMPERATURE: 98 F | SYSTOLIC BLOOD PRESSURE: 112 MMHG | WEIGHT: 155.4 LBS | DIASTOLIC BLOOD PRESSURE: 78 MMHG | RESPIRATION RATE: 18 BRPM

## 2024-03-08 DIAGNOSIS — E55.9 VITAMIN D DEFICIENCY: ICD-10-CM

## 2024-03-08 DIAGNOSIS — E78.1 HYPERTRIGLYCERIDEMIA: ICD-10-CM

## 2024-03-08 DIAGNOSIS — K21.01 GASTROESOPHAGEAL REFLUX DISEASE WITH ESOPHAGITIS AND HEMORRHAGE: ICD-10-CM

## 2024-03-08 DIAGNOSIS — E11.65 TYPE 2 DIABETES MELLITUS WITH HYPERGLYCEMIA, WITHOUT LONG-TERM CURRENT USE OF INSULIN: Primary | ICD-10-CM

## 2024-03-08 DIAGNOSIS — E78.2 HYPERLIPEMIA, MIXED: ICD-10-CM

## 2024-03-08 PROCEDURE — 99214 OFFICE O/P EST MOD 30 MIN: CPT | Performed by: FAMILY MEDICINE

## 2024-03-08 PROCEDURE — 1160F RVW MEDS BY RX/DR IN RCRD: CPT | Performed by: FAMILY MEDICINE

## 2024-03-08 PROCEDURE — 1159F MED LIST DOCD IN RCRD: CPT | Performed by: FAMILY MEDICINE

## 2024-03-08 RX ORDER — ERGOCALCIFEROL 1.25 MG/1
50000 CAPSULE ORAL WEEKLY
Qty: 12 CAPSULE | Refills: 1 | Status: CANCELLED | OUTPATIENT
Start: 2024-03-08

## 2024-03-08 RX ORDER — FENOFIBRATE 145 MG/1
145 TABLET, COATED ORAL DAILY
Qty: 90 TABLET | Refills: 1 | Status: SHIPPED | OUTPATIENT
Start: 2024-03-08

## 2024-03-08 RX ORDER — OMEPRAZOLE 40 MG/1
40 CAPSULE, DELAYED RELEASE ORAL 2 TIMES DAILY
Qty: 60 CAPSULE | Refills: 5 | Status: SHIPPED | OUTPATIENT
Start: 2024-03-08

## 2024-03-08 RX ORDER — LISINOPRIL 2.5 MG/1
2.5 TABLET ORAL DAILY
Qty: 90 TABLET | Refills: 1 | Status: SHIPPED | OUTPATIENT
Start: 2024-03-08

## 2024-03-08 RX ORDER — CLOTRIMAZOLE 1 %
1 CREAM (GRAM) TOPICAL 2 TIMES DAILY
Qty: 28 G | Refills: 0 | Status: CANCELLED | OUTPATIENT
Start: 2024-03-08

## 2024-03-08 RX ORDER — CLOTRIMAZOLE AND BETAMETHASONE DIPROPIONATE 10; .64 MG/G; MG/G
1 CREAM TOPICAL 2 TIMES DAILY
Qty: 45 G | Refills: 2 | Status: CANCELLED | OUTPATIENT
Start: 2024-03-08

## 2024-03-08 RX ORDER — ERGOCALCIFEROL 1.25 MG/1
50000 CAPSULE ORAL WEEKLY
Qty: 12 CAPSULE | Refills: 1 | Status: SHIPPED | OUTPATIENT
Start: 2024-03-08 | End: 2024-03-09

## 2024-03-08 RX ORDER — ATORVASTATIN CALCIUM 20 MG/1
20 TABLET, FILM COATED ORAL DAILY
Qty: 90 TABLET | Refills: 3 | Status: SHIPPED | OUTPATIENT
Start: 2024-03-08

## 2024-03-08 NOTE — PROGRESS NOTES
"Chief Complaint  Diabetes    Subjective        Smith Chisholm presents to [facility]  Diabetes      Patient presents today for labs, refills, and  Diabetes - not checking sugars.  Last A1c was 6.8%  Hyperlipidemia-on medication; active and he is dieting.    Vit d deficiency- on weekly med.    Objective   Vital Signs:  /78 (BP Location: Left arm, Patient Position: Sitting, Cuff Size: Adult)   Pulse 84   Temp 98 °F (36.7 °C) (Tympanic)   Resp 18   Ht 162.6 cm (64.02\")   Wt 70.5 kg (155 lb 6.4 oz)   SpO2 95%   BMI 26.66 kg/m²   Estimated body mass index is 26.66 kg/m² as calculated from the following:    Height as of this encounter: 162.6 cm (64.02\").    Weight as of this encounter: 70.5 kg (155 lb 6.4 oz).               Physical Exam  Vitals and nursing note reviewed.   Constitutional:       Appearance: Normal appearance. He is well-developed.   Cardiovascular:      Rate and Rhythm: Normal rate and regular rhythm.      Heart sounds: Normal heart sounds. No murmur heard.  Pulmonary:      Effort: Pulmonary effort is normal. No respiratory distress.      Breath sounds: Normal breath sounds. No stridor. No wheezing or rhonchi.   Neurological:      General: No focal deficit present.      Mental Status: He is alert and oriented to person, place, and time. He is not disoriented.   Psychiatric:         Mood and Affect: Mood normal.         Behavior: Behavior normal.        Result Review :                     Assessment and Plan     Diagnoses and all orders for this visit:    1. Type 2 diabetes mellitus with hyperglycemia, without long-term current use of insulin (Primary)  -     lisinopril (PRINIVIL,ZESTRIL) 2.5 MG tablet; Take 1 tablet by mouth Daily.  Dispense: 90 tablet; Refill: 1  -     metFORMIN (GLUCOPHAGE) 500 MG tablet; Take 1 tablet by mouth 2 (Two) Times a Day With Meals.  Dispense: 180 tablet; Refill: 3  -     Hemoglobin A1c    2. Hyperlipemia, mixed  -     atorvastatin " (LIPITOR) 20 MG tablet; Take 1 tablet by mouth Daily.  Dispense: 90 tablet; Refill: 3  -     Comprehensive Metabolic Panel  -     Lipid Panel    3. Hypertriglyceridemia  -     fenofibrate (TRICOR) 145 MG tablet; Take 1 tablet by mouth Daily.  Dispense: 90 tablet; Refill: 1    4. Gastroesophageal reflux disease with esophagitis and hemorrhage  -     omeprazole (priLOSEC) 40 MG capsule; Take 1 capsule by mouth 2 (Two) Times a Day.  Dispense: 60 capsule; Refill: 5    5. Vitamin D deficiency  -     vitamin D (ERGOCALCIFEROL) 1.25 MG (68218 UT) capsule capsule; Take 1 capsule by mouth 1 (One) Time Per Week.  Dispense: 12 capsule; Refill: 1  -     Vitamin D,25-Hydroxy             Follow Up     Return in about 3 months (around 6/8/2024) for diabetes, hyperlipidema.  Patient was given instructions and counseling regarding his condition or for health maintenance advice. Please see specific information pulled into the AVS if appropriate.       Refills, labs and work on diet and exercise.

## 2024-03-09 DIAGNOSIS — E11.65 TYPE 2 DIABETES MELLITUS WITH HYPERGLYCEMIA, WITHOUT LONG-TERM CURRENT USE OF INSULIN: ICD-10-CM

## 2024-03-09 DIAGNOSIS — E55.9 VITAMIN D DEFICIENCY: ICD-10-CM

## 2024-03-09 LAB
25(OH)D3+25(OH)D2 SERPL-MCNC: 22.8 NG/ML (ref 30–100)
ALBUMIN SERPL-MCNC: 4.3 G/DL (ref 3.5–5.2)
ALBUMIN/GLOB SERPL: 1.8 G/DL
ALP SERPL-CCNC: 86 U/L (ref 39–117)
ALT SERPL-CCNC: 18 U/L (ref 1–41)
AST SERPL-CCNC: 15 U/L (ref 1–40)
BILIRUB SERPL-MCNC: 0.4 MG/DL (ref 0–1.2)
BUN SERPL-MCNC: 16 MG/DL (ref 6–20)
BUN/CREAT SERPL: 15.5 (ref 7–25)
CALCIUM SERPL-MCNC: 9 MG/DL (ref 8.6–10.5)
CHLORIDE SERPL-SCNC: 104 MMOL/L (ref 98–107)
CHOLEST SERPL-MCNC: 162 MG/DL (ref 0–200)
CO2 SERPL-SCNC: 22 MMOL/L (ref 22–29)
CREAT SERPL-MCNC: 1.03 MG/DL (ref 0.76–1.27)
EGFRCR SERPLBLD CKD-EPI 2021: 90.2 ML/MIN/1.73
GLOBULIN SER CALC-MCNC: 2.4 GM/DL
GLUCOSE SERPL-MCNC: 133 MG/DL (ref 65–99)
HBA1C MFR BLD: 7.4 % (ref 4.8–5.6)
HDLC SERPL-MCNC: 60 MG/DL (ref 40–60)
LDLC SERPL CALC-MCNC: 80 MG/DL (ref 0–100)
POTASSIUM SERPL-SCNC: 4.2 MMOL/L (ref 3.5–5.2)
PROT SERPL-MCNC: 6.7 G/DL (ref 6–8.5)
SODIUM SERPL-SCNC: 136 MMOL/L (ref 136–145)
TRIGL SERPL-MCNC: 126 MG/DL (ref 0–150)
VLDLC SERPL CALC-MCNC: 22 MG/DL (ref 5–40)

## 2024-03-09 RX ORDER — ERGOCALCIFEROL 1.25 MG/1
CAPSULE ORAL
Qty: 24 CAPSULE | Refills: 1 | Status: SHIPPED | OUTPATIENT
Start: 2024-03-09

## 2024-03-11 ENCOUNTER — TELEPHONE (OUTPATIENT)
Dept: FAMILY MEDICINE CLINIC | Facility: CLINIC | Age: 49
End: 2024-03-11

## 2024-03-11 DIAGNOSIS — J06.9 UPPER RESPIRATORY TRACT INFECTION, UNSPECIFIED TYPE: ICD-10-CM

## 2024-03-11 RX ORDER — AMOXICILLIN AND CLAVULANATE POTASSIUM 875; 125 MG/1; MG/1
1 TABLET, FILM COATED ORAL 2 TIMES DAILY
Qty: 20 TABLET | Refills: 0 | OUTPATIENT
Start: 2024-03-11

## 2024-03-11 NOTE — TELEPHONE ENCOUNTER
Caller: Smith Chisholm    Relationship: Self    Best call back number: [phone]    Caller requesting test results: PATIENT    What test was performed: LABS     When was the test performed: 3/8/24    Where was the test performed: IN OFFICE     Additional notes:     PLEASE ADVISE

## 2024-03-12 ENCOUNTER — TELEPHONE (OUTPATIENT)
Dept: FAMILY MEDICINE CLINIC | Facility: CLINIC | Age: 49
End: 2024-03-12

## 2024-03-12 NOTE — TELEPHONE ENCOUNTER
Caller: Smith Chisholm    Relationship to patient: Self    Best call back number: [phone]     Patient is needing:  PATIENT STATES THAT HE WAS TOLD BY THE OFFICE THAT DR. WHITAKER WAS PRESCRIBING HIM VITAMIN D, BUT STATES THAT [facility] HAS NOT YET RECEIVED THE PRESCRIPTION.   PLEASE CALL THE PATIENT TO PROVIDE HIM WITH THE STATUS ON HIS GETTING THIS MEDICATION SENT TO [facility].

## 2024-03-13 DIAGNOSIS — E55.9 VITAMIN D DEFICIENCY: ICD-10-CM

## 2024-03-13 RX ORDER — ERGOCALCIFEROL 1.25 MG/1
CAPSULE ORAL
Qty: 24 CAPSULE | Refills: 1 | Status: SHIPPED | OUTPATIENT
Start: 2024-03-13

## 2024-03-28 DIAGNOSIS — K21.01 GASTROESOPHAGEAL REFLUX DISEASE WITH ESOPHAGITIS AND HEMORRHAGE: ICD-10-CM

## 2024-03-28 RX ORDER — OMEPRAZOLE 40 MG/1
40 CAPSULE, DELAYED RELEASE ORAL 2 TIMES DAILY
Qty: 60 CAPSULE | Refills: 5 | Status: SHIPPED | OUTPATIENT
Start: 2024-03-28

## 2024-04-22 ENCOUNTER — TELEPHONE (OUTPATIENT)
Dept: FAMILY MEDICINE CLINIC | Facility: CLINIC | Age: 49
End: 2024-04-22
Payer: COMMERCIAL

## 2024-04-22 NOTE — TELEPHONE ENCOUNTER
PT was transferred over to me schedule a hospital follow up appointment, PT was transferred to the wrong office, it appears he sees Dr. Us which is across the soto at [facility]. Transferred PT to correct office.

## 2024-04-23 ENCOUNTER — OFFICE VISIT (OUTPATIENT)
Dept: FAMILY MEDICINE CLINIC | Facility: CLINIC | Age: 49
End: 2024-04-23
Payer: COMMERCIAL

## 2024-04-23 VITALS
DIASTOLIC BLOOD PRESSURE: 80 MMHG | WEIGHT: 156 LBS | SYSTOLIC BLOOD PRESSURE: 112 MMHG | BODY MASS INDEX: 26.63 KG/M2 | OXYGEN SATURATION: 96 % | HEIGHT: 64 IN | HEART RATE: 95 BPM | RESPIRATION RATE: 18 BRPM | TEMPERATURE: 98.4 F

## 2024-04-23 DIAGNOSIS — K21.01 GASTROESOPHAGEAL REFLUX DISEASE WITH ESOPHAGITIS AND HEMORRHAGE: ICD-10-CM

## 2024-04-23 DIAGNOSIS — B35.0 RINGWORM OF THE SCALP: ICD-10-CM

## 2024-04-23 DIAGNOSIS — R21 RASH: ICD-10-CM

## 2024-04-23 DIAGNOSIS — R10.84 GENERALIZED ABDOMINAL PAIN: Primary | ICD-10-CM

## 2024-04-23 PROCEDURE — 1160F RVW MEDS BY RX/DR IN RCRD: CPT | Performed by: FAMILY MEDICINE

## 2024-04-23 PROCEDURE — 3051F HG A1C>EQUAL 7.0%<8.0%: CPT | Performed by: FAMILY MEDICINE

## 2024-04-23 PROCEDURE — 99214 OFFICE O/P EST MOD 30 MIN: CPT | Performed by: FAMILY MEDICINE

## 2024-04-23 PROCEDURE — 1159F MED LIST DOCD IN RCRD: CPT | Performed by: FAMILY MEDICINE

## 2024-04-23 RX ORDER — CLOTRIMAZOLE AND BETAMETHASONE DIPROPIONATE 10; .64 MG/G; MG/G
1 CREAM TOPICAL 2 TIMES DAILY
Qty: 45 G | Refills: 2 | Status: SHIPPED | OUTPATIENT
Start: 2024-04-23

## 2024-04-23 RX ORDER — OMEPRAZOLE 40 MG/1
40 CAPSULE, DELAYED RELEASE ORAL 2 TIMES DAILY
Qty: 60 CAPSULE | Refills: 5 | Status: SHIPPED | OUTPATIENT
Start: 2024-04-23

## 2024-04-23 RX ORDER — CLOTRIMAZOLE 1 %
1 CREAM (GRAM) TOPICAL 2 TIMES DAILY
Qty: 28 G | Refills: 0 | Status: SHIPPED | OUTPATIENT
Start: 2024-04-23

## 2024-04-23 NOTE — PROGRESS NOTES
"Chief Complaint  GI Problem (R side adb )    Subjective        Smith Chisholm presents to [facility]  GI Problem      Pt presents today for chronic RUQ pain and had labs done and diagnostics and all was normal and was told he needs to see GI doctor.    Ring worm needs refills  DM- stable last A1c was 7.4%.    Objective   Vital Signs:  /80 (BP Location: Left arm, Patient Position: Sitting, Cuff Size: Large Adult)   Pulse 95   Temp 98.4 °F (36.9 °C) (Tympanic)   Resp 18   Ht 162.6 cm (64.02\")   Wt 70.8 kg (156 lb)   SpO2 96%   BMI 26.76 kg/m²   Estimated body mass index is 26.76 kg/m² as calculated from the following:    Height as of this encounter: 162.6 cm (64.02\").    Weight as of this encounter: 70.8 kg (156 lb).               Physical Exam  Vitals and nursing note reviewed.   Constitutional:       Appearance: Normal appearance. He is well-developed.   Cardiovascular:      Rate and Rhythm: Normal rate and regular rhythm.      Heart sounds: Normal heart sounds. No murmur heard.  Pulmonary:      Effort: Pulmonary effort is normal. No respiratory distress.      Breath sounds: Normal breath sounds. No stridor. No wheezing or rhonchi.   Neurological:      General: No focal deficit present.      Mental Status: He is alert and oriented to person, place, and time. He is not disoriented.   Psychiatric:         Mood and Affect: Mood normal.         Behavior: Behavior normal.        Result Review :                     Assessment and Plan     Diagnoses and all orders for this visit:    1. Generalized abdominal pain (Primary)  -     Ambulatory Referral to Gastroenterology    2. Ringworm of the scalp  -     clotrimazole (LOTRIMIN) 1 % cream; Apply 1 Application topically to the appropriate area as directed 2 (Two) Times a Day.  Dispense: 28 g; Refill: 0    3. Rash  -     clotrimazole-betamethasone (Lotrisone) 1-0.05 % cream; Apply 1 Application topically to the appropriate area as " directed 2 (Two) Times a Day.  Dispense: 45 g; Refill: 2    4. Gastroesophageal reflux disease with esophagitis and hemorrhage  -     omeprazole (priLOSEC) 40 MG capsule; Take 1 capsule by mouth 2 (Two) Times a Day.  Dispense: 60 capsule; Refill: 5             Follow Up     No follow-ups on file.  Patient was given instructions and counseling regarding his condition or for health maintenance advice. Please see specific information pulled into the AVS if appropriate.       I have reviewed cbc, cmp, and lipase.    I requested and reviewed all records, labs, and diagnostics from the hospital with patient .  Patient is to follow-up with specialists as discussed and directed.

## 2024-04-29 ENCOUNTER — OFFICE VISIT (OUTPATIENT)
Dept: GASTROENTEROLOGY | Facility: CLINIC | Age: 49
End: 2024-04-29
Payer: COMMERCIAL

## 2024-04-29 VITALS
HEIGHT: 64 IN | TEMPERATURE: 98.6 F | HEART RATE: 65 BPM | OXYGEN SATURATION: 96 % | SYSTOLIC BLOOD PRESSURE: 126 MMHG | WEIGHT: 153 LBS | BODY MASS INDEX: 26.12 KG/M2 | DIASTOLIC BLOOD PRESSURE: 83 MMHG

## 2024-04-29 DIAGNOSIS — R10.11 CHRONIC RUQ PAIN: Primary | ICD-10-CM

## 2024-04-29 DIAGNOSIS — G89.29 CHRONIC RUQ PAIN: Primary | ICD-10-CM

## 2024-04-29 PROCEDURE — 1160F RVW MEDS BY RX/DR IN RCRD: CPT | Performed by: PHYSICIAN ASSISTANT

## 2024-04-29 PROCEDURE — 1159F MED LIST DOCD IN RCRD: CPT | Performed by: PHYSICIAN ASSISTANT

## 2024-04-29 PROCEDURE — 99213 OFFICE O/P EST LOW 20 MIN: CPT | Performed by: PHYSICIAN ASSISTANT

## 2024-04-29 RX ORDER — HYDROCODONE BITARTRATE AND ACETAMINOPHEN 5; 325 MG/1; MG/1
TABLET ORAL
COMMUNITY
Start: 2024-04-19

## 2024-04-29 RX ORDER — DICYCLOMINE HCL 20 MG
20 TABLET ORAL
COMMUNITY
Start: 2024-04-19

## 2024-04-29 RX ORDER — PANTOPRAZOLE SODIUM 40 MG/1
40 TABLET, DELAYED RELEASE ORAL
COMMUNITY
Start: 2024-04-19 | End: 2024-04-29

## 2024-04-29 RX ORDER — SUCRALFATE 1 G/1
1 TABLET ORAL 4 TIMES DAILY
COMMUNITY
Start: 2024-04-19 | End: 2024-05-03

## 2024-04-29 NOTE — PROGRESS NOTES
"Chief Complaint  Abdominal Pain    Subjective          History Of Present Illness:    Smith Chisholm is a  49 y.o. male patient of Dr. Chowdhury who presents today as a follow-up for right upper quadrant abdominal pain.  Patient has a known history of chronic right upper quadrant pain.    Patient does have a history of pancreatic divisum on MRCP.  Patient was advised to see Dr. 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  Based on chart review it appears patient did see him at the end of 2022.  It appears an EUS was ordered but this was never completed.  Patient unclear on what happened but appears to have been lost to follow-up and miscommunication.    Recent emergency room visit reviewed from 4/19/2024 for right upper quadrant abdominal pain and nausea.  Patient had a CT abdomen pelvis performed at that time which was unremarkable.  Additionally followed by right upper quadrant ultrasound which showed no evidence of cholecystitis or cholelithiasis.  Patient did have normal LFTs and lipase at that time.  Patient was given a GI cocktail and sent home on pantoprazole, sucralfate, Bentyl therapy.  Patient was then put on omeprazole twice daily by his primary care.  Patient reports the pain has improved but still reports it a 4 out of 10.  Patient reports the pain does radiate up into his chest. Patient denies any nausea, vomiting, dysphagia.  Patient denies any diarrhea, constipation, melena, hematochezia.  Patient denies any frequent NSAID use.    Additional data reviewed:  EGD 1/14/2021 - normal esophagus, acute gastritis, normal duodenum     Objective   Vital Signs:   /83   Pulse 65   Temp 98.6 °F (37 °C)   Ht 162.6 cm (64\")   Wt 69.4 kg (153 lb)   SpO2 96%   BMI 26.26 kg/m²       Physical Exam  Vitals reviewed.   Constitutional:       General: He is not in acute distress.     Appearance: Normal appearance. He is not ill-appearing.   HENT:      Head: Normocephalic and atraumatic.      " Nose: Nose normal.      Mouth/Throat:      Pharynx: Oropharynx is clear.   Eyes:      Extraocular Movements: Extraocular movements intact.      Conjunctiva/sclera: Conjunctivae normal.      Pupils: Pupils are equal, round, and reactive to light.   Pulmonary:      Effort: Pulmonary effort is normal.   Abdominal:      General: There is no distension.      Palpations: Abdomen is soft. There is no mass.      Tenderness: There is no abdominal tenderness.   Musculoskeletal:         General: No swelling. Normal range of motion.      Cervical back: Normal range of motion.   Skin:     General: Skin is warm and dry.      Findings: No bruising or rash.   Neurological:      General: No focal deficit present.      Mental Status: He is alert and oriented to person, place, and time.      Motor: No weakness.      Gait: Gait normal.   Psychiatric:         Mood and Affect: Mood normal.          Result Review :   The following data was reviewed by: Nalini Merchant PA-C on 04/29/2024:  CMP          9/1/2023    13:52 12/8/2023    08:44 3/8/2024    10:35   CMP   Glucose 109  120  133    BUN 9  11  16    Creatinine 0.81  0.96  1.03    Sodium 136  136  136    Potassium 4.2  4.3  4.2    Chloride 98  100  104    Calcium 9.5  9.5  9.0    Total Protein 7.1  7.0  6.7    Albumin 4.0  4.4  4.3    Globulin 3.1  2.6  2.4    Total Bilirubin 0.5  0.9  0.4    Alkaline Phosphatase 99  121  86    AST (SGOT) 19  18  15    ALT (SGPT) 27  25  18    BUN/Creatinine Ratio 11.1  11.5  15.5      CBC          9/1/2023    13:52 12/25/2023    11:33 12/26/2023    05:40   CBC   WBC 6.58  5.80     10.20       RBC 5.04  4.60     4.43       Hemoglobin 14.2  13.1     12.5       Hematocrit 41.5  40.4     39.3       MCV 82.3  87.8     88.7       MCH 28.2  28.5     28.2       MCHC 34.2  32.4     31.8       RDW 12.9  12.6     12.6       Platelets 225  238     240          Details          This result is from an external source.                   Assessment and Plan     Diagnoses and all orders for this visit:    1. Chronic RUQ pain (Primary)       Recommend patient reach out to Dr. Mehta for reevaluation of symptoms as this appears to be the same right upper quadrant pain that he has had for many years with a negative workup and poor response to both PPIs and TCA therapy. 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 Workup was never completed and it looks like EUS was possibly ordered at that time but unable to completely review the records.  In the meantime I would recommend he stay on omeprazole BID and sucralfate daily for the next 3-months.  He can continue to use Bentyl as needed and we discussed that this is an antispasmodic agent that can help with pain.    Follow Up   Return if symptoms worsen or fail to improve.    Dragon dictation used throughout this note.            Nalini Lees PA-C   [facility]  [address] [place] [address]  Office: [phone]

## 2024-06-24 ENCOUNTER — OFFICE VISIT (OUTPATIENT)
Dept: FAMILY MEDICINE CLINIC | Facility: CLINIC | Age: 49
End: 2024-06-24
Payer: COMMERCIAL

## 2024-06-24 VITALS
HEART RATE: 79 BPM | RESPIRATION RATE: 8 BRPM | DIASTOLIC BLOOD PRESSURE: 82 MMHG | WEIGHT: 153 LBS | OXYGEN SATURATION: 96 % | BODY MASS INDEX: 26.12 KG/M2 | SYSTOLIC BLOOD PRESSURE: 122 MMHG | HEIGHT: 64 IN | TEMPERATURE: 98.2 F

## 2024-06-24 DIAGNOSIS — K21.01 GASTROESOPHAGEAL REFLUX DISEASE WITH ESOPHAGITIS AND HEMORRHAGE: ICD-10-CM

## 2024-06-24 DIAGNOSIS — E11.65 TYPE 2 DIABETES MELLITUS WITH HYPERGLYCEMIA, WITHOUT LONG-TERM CURRENT USE OF INSULIN: ICD-10-CM

## 2024-06-24 DIAGNOSIS — E78.1 HYPERTRIGLYCERIDEMIA: ICD-10-CM

## 2024-06-24 DIAGNOSIS — E78.2 HYPERLIPEMIA, MIXED: ICD-10-CM

## 2024-06-24 DIAGNOSIS — E55.9 VITAMIN D DEFICIENCY: ICD-10-CM

## 2024-06-24 DIAGNOSIS — R10.11 RUQ PAIN: Primary | ICD-10-CM

## 2024-06-24 PROCEDURE — 1159F MED LIST DOCD IN RCRD: CPT | Performed by: FAMILY MEDICINE

## 2024-06-24 PROCEDURE — 3051F HG A1C>EQUAL 7.0%<8.0%: CPT | Performed by: FAMILY MEDICINE

## 2024-06-24 PROCEDURE — 1160F RVW MEDS BY RX/DR IN RCRD: CPT | Performed by: FAMILY MEDICINE

## 2024-06-24 PROCEDURE — 99214 OFFICE O/P EST MOD 30 MIN: CPT | Performed by: FAMILY MEDICINE

## 2024-06-24 RX ORDER — ERGOCALCIFEROL 1.25 MG/1
CAPSULE ORAL
Qty: 24 CAPSULE | Refills: 1 | Status: SHIPPED | OUTPATIENT
Start: 2024-06-24

## 2024-06-24 RX ORDER — DICYCLOMINE HCL 20 MG
20 TABLET ORAL EVERY 6 HOURS
Qty: 30 TABLET | Refills: 0 | Status: CANCELLED | OUTPATIENT
Start: 2024-06-24

## 2024-06-24 RX ORDER — ATORVASTATIN CALCIUM 20 MG/1
20 TABLET, FILM COATED ORAL DAILY
Qty: 90 TABLET | Refills: 3 | Status: CANCELLED | OUTPATIENT
Start: 2024-06-24

## 2024-06-24 RX ORDER — LISINOPRIL 2.5 MG/1
2.5 TABLET ORAL DAILY
Qty: 90 TABLET | Refills: 1 | Status: SHIPPED | OUTPATIENT
Start: 2024-06-24

## 2024-06-24 RX ORDER — OMEPRAZOLE 40 MG/1
40 CAPSULE, DELAYED RELEASE ORAL 2 TIMES DAILY
Qty: 60 CAPSULE | Refills: 5 | Status: SHIPPED | OUTPATIENT
Start: 2024-06-24

## 2024-06-24 RX ORDER — HYDROCODONE BITARTRATE AND ACETAMINOPHEN 5; 325 MG/1; MG/1
TABLET ORAL
Status: CANCELLED | OUTPATIENT
Start: 2024-06-24

## 2024-06-24 RX ORDER — FENOFIBRATE 145 MG/1
145 TABLET, COATED ORAL DAILY
Qty: 90 TABLET | Refills: 1 | Status: SHIPPED | OUTPATIENT
Start: 2024-06-24

## 2024-06-24 NOTE — PROGRESS NOTES
"Chief Complaint  Blister (Pt states that he is covered in blisters. He states he went to  and they told him it could be his liver and suggested he gets the HEP B inj. )    Subjective        Smith Chisholm presents to [facility]  Blister      Pt presents today for rash and bumps on his abd and over body that he had prior to going to hospital ER.  He was in for RUQ pain.  He was given PPI, carafate, and bentyl and was given a GI referral.  He states the rash was itchy but no longer has it.  No changes in food, products, or supplements.   No SOB.  No NVD.    HLD- on med and stable.    GERD- stable with med.   Objective   Vital Signs:  /82 (BP Location: Left arm, Patient Position: Sitting, Cuff Size: Adult)   Pulse 79   Temp 98.2 °F (36.8 °C) (Tympanic)   Resp 8   Ht 162.6 cm (64.02\")   Wt 69.4 kg (153 lb)   SpO2 96%   BMI 26.25 kg/m²   Estimated body mass index is 26.25 kg/m² as calculated from the following:    Height as of this encounter: 162.6 cm (64.02\").    Weight as of this encounter: 69.4 kg (153 lb).               Physical Exam  Vitals and nursing note reviewed.   Constitutional:       Appearance: Normal appearance. He is well-developed.   Cardiovascular:      Rate and Rhythm: Normal rate and regular rhythm.      Heart sounds: Normal heart sounds. No murmur heard.  Pulmonary:      Effort: Pulmonary effort is normal. No respiratory distress.      Breath sounds: Normal breath sounds. No stridor. No wheezing or rhonchi.   Abdominal:      General: Abdomen is flat. There is no distension.      Palpations: There is no mass.      Tenderness: There is no abdominal tenderness.   Neurological:      General: No focal deficit present.      Mental Status: He is alert and oriented to person, place, and time. He is not disoriented.   Psychiatric:         Mood and Affect: Mood normal.         Behavior: Behavior normal.        Result Review :                     Assessment and Plan "     Diagnoses and all orders for this visit:    1. RUQ pain (Primary)    2. Hyperlipemia, mixed    3. Hypertriglyceridemia  -     fenofibrate (TRICOR) 145 MG tablet; Take 1 tablet by mouth Daily.  Dispense: 90 tablet; Refill: 1    4. Type 2 diabetes mellitus with hyperglycemia, without long-term current use of insulin  -     lisinopril (PRINIVIL,ZESTRIL) 2.5 MG tablet; Take 1 tablet by mouth Daily.  Dispense: 90 tablet; Refill: 1  -     metFORMIN (GLUCOPHAGE) 1000 MG tablet; Take 1 tablet by mouth 2 (Two) Times a Day With Meals.  Dispense: 180 tablet; Refill: 1    5. Gastroesophageal reflux disease with esophagitis and hemorrhage  -     omeprazole (priLOSEC) 40 MG capsule; Take 1 capsule by mouth 2 (Two) Times a Day.  Dispense: 60 capsule; Refill: 5    6. Vitamin D deficiency  -     vitamin D (ERGOCALCIFEROL) 1.25 MG (38128 UT) capsule capsule; Take one capsule twice a week.  Dispense: 24 capsule; Refill: 1             Follow Up     No follow-ups on file.  Patient was given instructions and counseling regarding his condition or for health maintenance advice. Please see specific information pulled into the AVS if appropriate.       I requested and reviewed all records, labs, and diagnostics from the hospital with patient and family.  Patient is to follow-up with specialists as discussed and directed.  Follow up with GI doctor.  Fill med given to him in ED.

## 2024-07-26 ENCOUNTER — TELEPHONE (OUTPATIENT)
Dept: FAMILY MEDICINE CLINIC | Facility: CLINIC | Age: 49
End: 2024-07-26

## 2024-07-26 NOTE — TELEPHONE ENCOUNTER
Caller: CM    Relationship: Other  / PASSPORT    Best call back number:     [phone]       What was the call regarding: THEY ARE REQUESTING A GLUCOMETER FOR PATIENT. SOME COVERED ONES ARE BELOW:    FREESTYLE [employer] LIGHT  FREESTYLE LIGHT  ONE TOUCH ULTRA 2        [id number] [address] AT [facility] - [phone] [facility] [phone]  [phone]

## 2024-08-02 PROBLEM — I10 PRIMARY HYPERTENSION: Status: ACTIVE | Noted: 2024-08-02

## 2024-08-09 ENCOUNTER — OFFICE VISIT (OUTPATIENT)
Dept: FAMILY MEDICINE CLINIC | Facility: CLINIC | Age: 49
End: 2024-08-09
Payer: COMMERCIAL

## 2024-08-09 VITALS
DIASTOLIC BLOOD PRESSURE: 84 MMHG | TEMPERATURE: 98.2 F | WEIGHT: 153 LBS | RESPIRATION RATE: 18 BRPM | HEART RATE: 95 BPM | HEIGHT: 64 IN | BODY MASS INDEX: 26.12 KG/M2 | SYSTOLIC BLOOD PRESSURE: 126 MMHG | OXYGEN SATURATION: 97 %

## 2024-08-09 DIAGNOSIS — E78.2 HYPERLIPEMIA, MIXED: ICD-10-CM

## 2024-08-09 DIAGNOSIS — Z11.59 NEED FOR HEPATITIS C SCREENING TEST: ICD-10-CM

## 2024-08-09 DIAGNOSIS — E11.65 TYPE 2 DIABETES MELLITUS WITH HYPERGLYCEMIA, WITHOUT LONG-TERM CURRENT USE OF INSULIN: ICD-10-CM

## 2024-08-09 DIAGNOSIS — E55.9 VITAMIN D DEFICIENCY: ICD-10-CM

## 2024-08-09 DIAGNOSIS — S16.1XXA STRAIN OF NECK MUSCLE, INITIAL ENCOUNTER: Primary | ICD-10-CM

## 2024-08-09 DIAGNOSIS — R21 RASH: ICD-10-CM

## 2024-08-09 PROCEDURE — 1159F MED LIST DOCD IN RCRD: CPT | Performed by: FAMILY MEDICINE

## 2024-08-09 PROCEDURE — 99214 OFFICE O/P EST MOD 30 MIN: CPT | Performed by: FAMILY MEDICINE

## 2024-08-09 PROCEDURE — 3051F HG A1C>EQUAL 7.0%<8.0%: CPT | Performed by: FAMILY MEDICINE

## 2024-08-09 PROCEDURE — 3074F SYST BP LT 130 MM HG: CPT | Performed by: FAMILY MEDICINE

## 2024-08-09 PROCEDURE — 3079F DIAST BP 80-89 MM HG: CPT | Performed by: FAMILY MEDICINE

## 2024-08-09 PROCEDURE — 1160F RVW MEDS BY RX/DR IN RCRD: CPT | Performed by: FAMILY MEDICINE

## 2024-08-09 RX ORDER — ATORVASTATIN CALCIUM 20 MG/1
20 TABLET, FILM COATED ORAL DAILY
Qty: 90 TABLET | Refills: 3 | Status: SHIPPED | OUTPATIENT
Start: 2024-08-09

## 2024-08-09 RX ORDER — BACLOFEN 20 MG/1
20 TABLET ORAL 3 TIMES DAILY PRN
Qty: 30 TABLET | Refills: 0 | Status: SHIPPED | OUTPATIENT
Start: 2024-08-09

## 2024-08-09 RX ORDER — CLOTRIMAZOLE AND BETAMETHASONE DIPROPIONATE 10; .64 MG/G; MG/G
1 CREAM TOPICAL 2 TIMES DAILY
Qty: 45 G | Refills: 2 | Status: SHIPPED | OUTPATIENT
Start: 2024-08-09

## 2024-08-09 RX ORDER — ERGOCALCIFEROL 1.25 MG/1
CAPSULE ORAL
Qty: 24 CAPSULE | Refills: 1 | Status: SHIPPED | OUTPATIENT
Start: 2024-08-09

## 2024-08-09 NOTE — PROGRESS NOTES
"Chief Complaint  Pain (R shoulder pain, runs into the neck S/S < 2wks )    Subjective        Smith Chisholm presents to [facility]  Pain      Pt has a 2 week hx of left side neck pain and goes into back of head. Worse in certain positions.  Comes and goes.  No cold sx or recent illnesses.  No recent hx of injury.    Patient presents today for labs, refills, and  Diabetes - not checking sugars.    Hypertension - no chest pains or headaches  Hyperlipidemia - on medication; active    Objective   Vital Signs:  /84 (BP Location: Left arm, Patient Position: Sitting, Cuff Size: Adult)   Pulse 95   Temp 98.2 °F (36.8 °C) (Tympanic)   Resp 18   Ht 162.6 cm (64.02\")   Wt 69.4 kg (153 lb)   SpO2 97%   BMI 26.25 kg/m²   Estimated body mass index is 26.25 kg/m² as calculated from the following:    Height as of this encounter: 162.6 cm (64.02\").    Weight as of this encounter: 69.4 kg (153 lb).               Physical Exam  Vitals and nursing note reviewed.   Constitutional:       Appearance: Normal appearance. He is well-developed.   Cardiovascular:      Rate and Rhythm: Normal rate and regular rhythm.      Heart sounds: Normal heart sounds. No murmur heard.  Pulmonary:      Effort: Pulmonary effort is normal. No respiratory distress.      Breath sounds: Normal breath sounds. No stridor. No wheezing or rhonchi.   Musculoskeletal:      Comments: Left side of neck some ttp as well as back of scalp on left side.    He has FROM neck and shoulders.     Neurological:      General: No focal deficit present.      Mental Status: He is alert and oriented to person, place, and time. He is not disoriented.   Psychiatric:         Mood and Affect: Mood normal.         Behavior: Behavior normal.        Result Review :                     Assessment and Plan     Diagnoses and all orders for this visit:    1. Strain of neck muscle, initial encounter (Primary)  -     baclofen (LIORESAL) 20 MG tablet; Take " 1 tablet by mouth 3 (Three) Times a Day As Needed for Muscle Spasms.  Dispense: 30 tablet; Refill: 0    2. Type 2 diabetes mellitus with hyperglycemia, without long-term current use of insulin  -     metFORMIN (GLUCOPHAGE) 1000 MG tablet; Take 1 tablet by mouth 2 (Two) Times a Day With Meals.  Dispense: 180 tablet; Refill: 1  -     Microalbumin / Creatinine Urine Ratio - Urine, Clean Catch  -     Hemoglobin A1c    3. Hyperlipemia, mixed  -     atorvastatin (LIPITOR) 20 MG tablet; Take 1 tablet by mouth Daily.  Dispense: 90 tablet; Refill: 3  -     Comprehensive Metabolic Panel  -     Lipid Panel    4. Vitamin D deficiency  -     vitamin D (ERGOCALCIFEROL) 1.25 MG (30211 UT) capsule capsule; Take one capsule twice a week.  Dispense: 24 capsule; Refill: 1  -     Vitamin D,25-Hydroxy    5. Rash  -     clotrimazole-betamethasone (Lotrisone) 1-0.05 % cream; Apply 1 Application topically to the appropriate area as directed 2 (Two) Times a Day.  Dispense: 45 g; Refill: 2    6. Need for hepatitis C screening test  -     Hepatitis C Antibody             Follow Up     No follow-ups on file.  Patient was given instructions and counseling regarding his condition or for health maintenance advice. Please see specific information pulled into the AVS if appropriate.     Nsaids, ice, heat and baclofen- SE discussed.   Will rto for fasting labs.

## 2024-08-12 ENCOUNTER — TELEPHONE (OUTPATIENT)
Dept: FAMILY MEDICINE CLINIC | Facility: CLINIC | Age: 49
End: 2024-08-12

## 2024-08-12 NOTE — TELEPHONE ENCOUNTER
Caller: Reeher    Relationship:     Best call back number: [phone]    Equipment requested: GLUCOMETER     Prescribing Provider: DR WHITAKER     Additional information or concerns: GLUCOMETER COVERED BY INSURANCE   FREESTYLE [employer] LITE   FREESTYLE LITE  ONE TOUCH ULTRA 2

## 2024-08-12 NOTE — TELEPHONE ENCOUNTER
Ok to call or send in.    Glucometer #1  Test strips use qd #100  Lancets use qd #100  5 refills  Dx E11.9      Thanks

## 2024-08-13 NOTE — TELEPHONE ENCOUNTER
CALLED PT NO ANSWER   Pt called he had telephone visit on 4- pt stated that medication is not working for his cold. Pt asked for Amoxicillin to be sent in.     [id number] - [place], [place] - [address] AT [facility] - [phone]  - [phone] (Phone)  [phone] (Fax)

## 2024-08-14 ENCOUNTER — TELEPHONE (OUTPATIENT)
Dept: FAMILY MEDICINE CLINIC | Facility: CLINIC | Age: 49
End: 2024-08-14
Payer: COMMERCIAL

## 2024-08-14 DIAGNOSIS — E78.1 HYPERTRIGLYCERIDEMIA: ICD-10-CM

## 2024-08-14 LAB
25(OH)D3+25(OH)D2 SERPL-MCNC: 14.6 NG/ML (ref 30–100)
ALBUMIN SERPL-MCNC: 3.9 G/DL (ref 4.1–5.1)
ALBUMIN/CREAT UR: 827 MG/G CREAT (ref 0–29)
ALP SERPL-CCNC: 103 IU/L (ref 44–121)
ALT SERPL-CCNC: 26 IU/L (ref 0–44)
AST SERPL-CCNC: 21 IU/L (ref 0–40)
BILIRUB SERPL-MCNC: 0.5 MG/DL (ref 0–1.2)
BUN SERPL-MCNC: 11 MG/DL (ref 6–24)
BUN/CREAT SERPL: 13 (ref 9–20)
CALCIUM SERPL-MCNC: 9.1 MG/DL (ref 8.7–10.2)
CHLORIDE SERPL-SCNC: 100 MMOL/L (ref 96–106)
CHOLEST SERPL-MCNC: 443 MG/DL (ref 100–199)
CO2 SERPL-SCNC: 16 MMOL/L (ref 20–29)
CREAT SERPL-MCNC: 0.82 MG/DL (ref 0.76–1.27)
CREAT UR-MCNC: 62.6 MG/DL
EGFRCR SERPLBLD CKD-EPI 2021: 109 ML/MIN/1.73
GLOBULIN SER CALC-MCNC: 2.9 G/DL (ref 1.5–4.5)
GLUCOSE SERPL-MCNC: 111 MG/DL (ref 70–99)
HBA1C MFR BLD: 6.8 % (ref 4.8–5.6)
HCV IGG SERPL QL IA: NON REACTIVE
HDLC SERPL-MCNC: 29 MG/DL
LDL CALC COMMENT:: ABNORMAL
LDLC SERPL CALC-MCNC: ABNORMAL MG/DL (ref 0–99)
MICROALBUMIN UR-MCNC: 517.5 UG/ML
POTASSIUM SERPL-SCNC: 4.1 MMOL/L (ref 3.5–5.2)
PROT SERPL-MCNC: 6.8 G/DL (ref 6–8.5)
SODIUM SERPL-SCNC: 135 MMOL/L (ref 134–144)
TRIGL SERPL-MCNC: 1319 MG/DL (ref 0–149)
VLDLC SERPL CALC-MCNC: ABNORMAL MG/DL (ref 5–40)

## 2024-08-14 RX ORDER — FENOFIBRATE 145 MG/1
145 TABLET, COATED ORAL DAILY
Qty: 90 TABLET | Refills: 1 | Status: SHIPPED | OUTPATIENT
Start: 2024-08-14

## 2024-08-14 NOTE — TELEPHONE ENCOUNTER
The following patient called regarding his lab lab results. He was given the information stated regarding his labs and verbalized an understanding. Pt ask that you send in a refill for lisinopril because he is out.

## 2024-08-14 NOTE — TELEPHONE ENCOUNTER
lisinopril (PRINIVIL,ZESTRIL) 2.5 MG tablet [[id number]]    Order Details  Dose: 2.5 mg Route: Oral Frequency: Daily   Dispense Quantity: 90 tablet Refills: 1          Sig: Take 1 tablet by mouth Daily.         Start Date: 06/24/24     Advised pt to call pharmacy, he should have enough until mid Sept and 1 refill.  Pt voiced understanding.

## 2024-08-29 ENCOUNTER — TELEPHONE (OUTPATIENT)
Dept: FAMILY MEDICINE CLINIC | Facility: CLINIC | Age: 49
End: 2024-08-29

## 2024-09-04 ENCOUNTER — TELEPHONE (OUTPATIENT)
Dept: FAMILY MEDICINE CLINIC | Facility: CLINIC | Age: 49
End: 2024-09-04
Payer: COMMERCIAL

## 2024-09-04 NOTE — TELEPHONE ENCOUNTER
Pt also states he need a PA for cholesterol medication, atorvastatin.      KEY [id number]  DX: E78.2    Member should be able to get the drug/product without a PA at this time.    Tried one for fenofibrate    KEY [id number]  DX E78.1    Member should be able to get the drug/product without a PA at this time.    [facility] RMA

## 2024-09-04 NOTE — TELEPHONE ENCOUNTER
Caller: Smith Chisholm    Relationship: Self    Best call back number:     [phone]       What was the call regarding: PATIENT STATES PHARMACY TOLD HIM THAT A PRIOR AUTH WAS NEEDED FOR OMEPRAZOLE. PLEASE CALL AND ADVISE.     [facility] #[id number] - [place], [place] - [address] AT [facility] - [phone] [facility] [phone]  [phone]

## 2024-09-04 NOTE — TELEPHONE ENCOUNTER
Started ESTEPHANIE ortiz CoverMyMeds    KEY: [id number]  DX: K21.01    Sent to [employer] will await response.    PA approved 9/4/24-9/4/25. Pt notified.    [facility]

## 2024-09-05 ENCOUNTER — TELEPHONE (OUTPATIENT)
Dept: FAMILY MEDICINE CLINIC | Facility: CLINIC | Age: 49
End: 2024-09-05
Payer: COMMERCIAL

## 2024-09-05 NOTE — TELEPHONE ENCOUNTER
Caller: AZUCENA WITH HOME CARE DELIVERED    Relationship to patient: Other    Best call back number:     Patient is needing: AZUCENA WOULD LIKE A CALL BACK ABOUT THE [facility] DOCUMENT FOR THE BLOOD PRESSURE CUFF.   SHE STATES IT IS MISSING THE DATE OF THE LAST VISIT ON THE FORM.    ONCE THEY GET THAT DATE ADDED, THEY CAN SHIP OUT THE BLOOD PRESSURE CUFF TO THE PATIENT.   PLEASE ADVISE OF DATE OF LAST VISIT.

## 2024-09-09 DIAGNOSIS — E11.65 TYPE 2 DIABETES MELLITUS WITH HYPERGLYCEMIA, WITHOUT LONG-TERM CURRENT USE OF INSULIN: ICD-10-CM

## 2024-09-09 RX ORDER — LISINOPRIL 2.5 MG/1
2.5 TABLET ORAL DAILY
Qty: 90 TABLET | Refills: 0 | Status: SHIPPED | OUTPATIENT
Start: 2024-09-09

## 2024-10-17 ENCOUNTER — TELEPHONE (OUTPATIENT)
Dept: FAMILY MEDICINE CLINIC | Facility: CLINIC | Age: 49
End: 2024-10-17
Payer: COMMERCIAL

## 2024-10-17 DIAGNOSIS — E78.2 HYPERLIPEMIA, MIXED: ICD-10-CM

## 2024-10-17 DIAGNOSIS — E11.65 TYPE 2 DIABETES MELLITUS WITH HYPERGLYCEMIA, WITHOUT LONG-TERM CURRENT USE OF INSULIN: ICD-10-CM

## 2024-10-17 DIAGNOSIS — K21.01 GASTROESOPHAGEAL REFLUX DISEASE WITH ESOPHAGITIS AND HEMORRHAGE: ICD-10-CM

## 2024-10-17 RX ORDER — ATORVASTATIN CALCIUM 20 MG/1
20 TABLET, FILM COATED ORAL DAILY
Qty: 90 TABLET | Refills: 0 | Status: SHIPPED | OUTPATIENT
Start: 2024-10-17

## 2024-10-17 RX ORDER — OMEPRAZOLE 40 MG/1
40 CAPSULE, DELAYED RELEASE ORAL 2 TIMES DAILY
Qty: 180 CAPSULE | Refills: 0 | Status: SHIPPED | OUTPATIENT
Start: 2024-10-17

## 2024-10-17 NOTE — TELEPHONE ENCOUNTER
LOV                  8/9/2024                    NOV                  Visit date not found  LAST REFILL     8/9/2024, 6/24/2024  PROTOCOL       Met    I called and spoke with the patient he needed refills on his medication. Rx's sent for 90 days

## 2024-10-29 ENCOUNTER — TELEPHONE (OUTPATIENT)
Dept: FAMILY MEDICINE CLINIC | Facility: CLINIC | Age: 49
End: 2024-10-29
Payer: COMMERCIAL

## 2024-10-29 DIAGNOSIS — K21.01 GASTROESOPHAGEAL REFLUX DISEASE WITH ESOPHAGITIS AND HEMORRHAGE: ICD-10-CM

## 2024-10-29 RX ORDER — OMEPRAZOLE 40 MG/1
40 CAPSULE, DELAYED RELEASE ORAL 2 TIMES DAILY
Qty: 180 CAPSULE | Refills: 0 | Status: SHIPPED | OUTPATIENT
Start: 2024-10-29

## 2024-10-29 RX ORDER — PANTOPRAZOLE SODIUM 40 MG/1
40 TABLET, DELAYED RELEASE ORAL DAILY
Qty: 90 TABLET | Refills: 1 | Status: SHIPPED | OUTPATIENT
Start: 2024-10-29

## 2024-10-29 NOTE — TELEPHONE ENCOUNTER
Caller: Smith Chisholm    Relationship: Self    Best call back number:[phone] (Mobile)     Requested Prescriptions:   omeprazole (priLOSEC) 40 MG capsule        Pharmacy where request should be sent:  [id number] [address] [facility] [address] AT [facility] - [phone] [facility] [phone]      Last office visit with prescribing clinician: 8/9/2024   Last telemedicine visit with prescribing clinician: Visit date not found   Next office visit with prescribing clinician: Visit date not found     Additional details provided by patient: PATIENT CALLED TO REQUEST A MEDICATION REFILL ON HIS MEDICATION. PATIENT STATES THAT THE PHARMACY IS STATING THAT THIS MEDICATION HAS BEEN PICKED UP, BUT PATIENT STATES THAT HE NEVER WENT TO PICK IT UP.    Does the patient have less than a 3 day supply:  [x] Yes  [] No    Would you like a call back once the refill request has been completed: [x] Yes [] No    If the office needs to give you a call back, can they leave a voicemail: [x] Yes [] No    Brenton Iglesias Rep   10/29/24 10:46 EDT         THANKS

## 2024-11-11 ENCOUNTER — TELEPHONE (OUTPATIENT)
Dept: FAMILY MEDICINE CLINIC | Facility: CLINIC | Age: 49
End: 2024-11-11
Payer: COMMERCIAL

## 2024-11-11 RX ORDER — TOBRAMYCIN 3 MG/ML
SOLUTION/ DROPS OPHTHALMIC
Qty: 5 ML | Refills: 0 | Status: SHIPPED | OUTPATIENT
Start: 2024-11-11

## 2024-11-11 NOTE — TELEPHONE ENCOUNTER
Caller: Smith Chisholm     Relationship: SELF    Best call back number: [phone]     What is your medical concern? RIGHT EYE ITCHING AND SWELLING    How long has this issue been going on? 1 DAY    Is your provider already aware of this issue? NO    Have you been treated for this issue? NO    [id number] [address] AT [facility] - [phone] [facility] [phone]

## 2024-12-08 ENCOUNTER — APPOINTMENT (OUTPATIENT)
Dept: CT IMAGING | Facility: HOSPITAL | Age: 49
End: 2024-12-08
Payer: COMMERCIAL

## 2024-12-08 ENCOUNTER — HOSPITAL ENCOUNTER (EMERGENCY)
Facility: HOSPITAL | Age: 49
Discharge: HOME | End: 2024-12-08
Attending: EMERGENCY MEDICINE | Admitting: EMERGENCY MEDICINE
Payer: COMMERCIAL

## 2024-12-08 VITALS
DIASTOLIC BLOOD PRESSURE: 97 MMHG | SYSTOLIC BLOOD PRESSURE: 122 MMHG | HEART RATE: 79 BPM | BODY MASS INDEX: 26.46 KG/M2 | TEMPERATURE: 98.3 F | OXYGEN SATURATION: 95 % | HEIGHT: 64 IN | WEIGHT: 155 LBS | RESPIRATION RATE: 18 BRPM

## 2024-12-08 DIAGNOSIS — N23 RENAL COLIC ON RIGHT SIDE: ICD-10-CM

## 2024-12-08 DIAGNOSIS — N20.1 CALCULUS OF URETER: Primary | ICD-10-CM

## 2024-12-08 LAB
ALBUMIN SERPL-MCNC: 3.5 G/DL (ref 3.5–5.2)
ALBUMIN/GLOB SERPL: 1.3 G/DL
ALP SERPL-CCNC: 90 U/L (ref 39–117)
ALT SERPL W P-5'-P-CCNC: 23 U/L (ref 1–41)
ANION GAP SERPL CALCULATED.3IONS-SCNC: 7 MMOL/L (ref 5–15)
AST SERPL-CCNC: 19 U/L (ref 1–40)
BACTERIA UR QL AUTO: NORMAL /HPF
BASOPHILS # BLD AUTO: 0.03 10*3/MM3 (ref 0–0.2)
BASOPHILS NFR BLD AUTO: 0.6 % (ref 0–1.5)
BILIRUB SERPL-MCNC: 0.3 MG/DL (ref 0–1.2)
BILIRUB UR QL STRIP: NEGATIVE
BUN SERPL-MCNC: 10 MG/DL (ref 6–20)
BUN/CREAT SERPL: 12.7 (ref 7–25)
CALCIUM SPEC-SCNC: 8.9 MG/DL (ref 8.6–10.5)
CHLORIDE SERPL-SCNC: 102 MMOL/L (ref 98–107)
CLARITY UR: CLEAR
CO2 SERPL-SCNC: 26 MMOL/L (ref 22–29)
COLOR UR: YELLOW
CREAT SERPL-MCNC: 0.79 MG/DL (ref 0.76–1.27)
DEPRECATED RDW RBC AUTO: 43.2 FL (ref 37–54)
EGFRCR SERPLBLD CKD-EPI 2021: 110.3 ML/MIN/1.73
EOSINOPHIL # BLD AUTO: 0.2 10*3/MM3 (ref 0–0.4)
EOSINOPHIL NFR BLD AUTO: 3.8 % (ref 0.3–6.2)
ERYTHROCYTE [DISTWIDTH] IN BLOOD BY AUTOMATED COUNT: 13.7 % (ref 12.3–15.4)
GLOBULIN UR ELPH-MCNC: 2.6 GM/DL
GLUCOSE SERPL-MCNC: 152 MG/DL (ref 65–99)
GLUCOSE UR STRIP-MCNC: NEGATIVE MG/DL
HCT VFR BLD AUTO: 38.7 % (ref 37.5–51)
HGB BLD-MCNC: 12.9 G/DL (ref 13–17.7)
HGB UR QL STRIP.AUTO: ABNORMAL
HOLD SPECIMEN: NORMAL
HOLD SPECIMEN: NORMAL
HYALINE CASTS UR QL AUTO: NORMAL /LPF
IMM GRANULOCYTES # BLD AUTO: 0.01 10*3/MM3 (ref 0–0.05)
IMM GRANULOCYTES NFR BLD AUTO: 0.2 % (ref 0–0.5)
KETONES UR QL STRIP: NEGATIVE
LEUKOCYTE ESTERASE UR QL STRIP.AUTO: NEGATIVE
LIPASE SERPL-CCNC: 46 U/L (ref 13–60)
LYMPHOCYTES # BLD AUTO: 2.07 10*3/MM3 (ref 0.7–3.1)
LYMPHOCYTES NFR BLD AUTO: 39.4 % (ref 19.6–45.3)
MCH RBC QN AUTO: 28.8 PG (ref 26.6–33)
MCHC RBC AUTO-ENTMCNC: 33.3 G/DL (ref 31.5–35.7)
MCV RBC AUTO: 86.4 FL (ref 79–97)
MONOCYTES # BLD AUTO: 0.35 10*3/MM3 (ref 0.1–0.9)
MONOCYTES NFR BLD AUTO: 6.7 % (ref 5–12)
NEUTROPHILS NFR BLD AUTO: 2.59 10*3/MM3 (ref 1.7–7)
NEUTROPHILS NFR BLD AUTO: 49.3 % (ref 42.7–76)
NITRITE UR QL STRIP: NEGATIVE
NRBC BLD AUTO-RTO: 0 /100 WBC (ref 0–0.2)
PH UR STRIP.AUTO: 5.5 [PH] (ref 5–8)
PLATELET # BLD AUTO: 226 10*3/MM3 (ref 140–450)
PMV BLD AUTO: 9.8 FL (ref 6–12)
POTASSIUM SERPL-SCNC: 4 MMOL/L (ref 3.5–5.2)
PROT SERPL-MCNC: 6.1 G/DL (ref 6–8.5)
PROT UR QL STRIP: ABNORMAL
RBC # BLD AUTO: 4.48 10*6/MM3 (ref 4.14–5.8)
RBC # UR STRIP: NORMAL /HPF
REF LAB TEST METHOD: NORMAL
SODIUM SERPL-SCNC: 135 MMOL/L (ref 136–145)
SP GR UR STRIP: <=1.005 (ref 1–1.03)
SQUAMOUS #/AREA URNS HPF: NORMAL /HPF
UROBILINOGEN UR QL STRIP: ABNORMAL
WBC # UR STRIP: NORMAL /HPF
WBC NRBC COR # BLD AUTO: 5.25 10*3/MM3 (ref 3.4–10.8)
WHOLE BLOOD HOLD COAG: NORMAL
WHOLE BLOOD HOLD SPECIMEN: NORMAL

## 2024-12-08 PROCEDURE — 74177 CT ABD & PELVIS W/CONTRAST: CPT

## 2024-12-08 PROCEDURE — 99285 EMERGENCY DEPT VISIT HI MDM: CPT

## 2024-12-08 PROCEDURE — 96374 THER/PROPH/DIAG INJ IV PUSH: CPT

## 2024-12-08 PROCEDURE — 85025 COMPLETE CBC W/AUTO DIFF WBC: CPT | Performed by: EMERGENCY MEDICINE

## 2024-12-08 PROCEDURE — 96375 TX/PRO/DX INJ NEW DRUG ADDON: CPT

## 2024-12-08 PROCEDURE — 25010000002 MORPHINE PER 10 MG: Performed by: EMERGENCY MEDICINE

## 2024-12-08 PROCEDURE — 83690 ASSAY OF LIPASE: CPT | Performed by: EMERGENCY MEDICINE

## 2024-12-08 PROCEDURE — 25010000002 ONDANSETRON PER 1 MG: Performed by: EMERGENCY MEDICINE

## 2024-12-08 PROCEDURE — 96376 TX/PRO/DX INJ SAME DRUG ADON: CPT

## 2024-12-08 PROCEDURE — 80053 COMPREHEN METABOLIC PANEL: CPT | Performed by: EMERGENCY MEDICINE

## 2024-12-08 PROCEDURE — 81001 URINALYSIS AUTO W/SCOPE: CPT | Performed by: EMERGENCY MEDICINE

## 2024-12-08 PROCEDURE — 25510000001 IOPAMIDOL 61 % SOLUTION: Performed by: EMERGENCY MEDICINE

## 2024-12-08 RX ORDER — IOPAMIDOL 612 MG/ML
100 INJECTION, SOLUTION INTRAVASCULAR
Status: COMPLETED | OUTPATIENT
Start: 2024-12-08 | End: 2024-12-08

## 2024-12-08 RX ORDER — MORPHINE SULFATE 2 MG/ML
4 INJECTION, SOLUTION INTRAMUSCULAR; INTRAVENOUS ONCE
Status: COMPLETED | OUTPATIENT
Start: 2024-12-08 | End: 2024-12-08

## 2024-12-08 RX ORDER — HYDROCODONE BITARTRATE AND ACETAMINOPHEN 5; 325 MG/1; MG/1
1 TABLET ORAL ONCE
Status: COMPLETED | OUTPATIENT
Start: 2024-12-08 | End: 2024-12-08

## 2024-12-08 RX ORDER — ONDANSETRON 4 MG/1
4 TABLET, ORALLY DISINTEGRATING ORAL EVERY 8 HOURS PRN
Qty: 15 TABLET | Refills: 0 | Status: SHIPPED | OUTPATIENT
Start: 2024-12-08 | End: 2024-12-13

## 2024-12-08 RX ORDER — ONDANSETRON 2 MG/ML
4 INJECTION INTRAMUSCULAR; INTRAVENOUS ONCE
Status: COMPLETED | OUTPATIENT
Start: 2024-12-08 | End: 2024-12-08

## 2024-12-08 RX ORDER — TAMSULOSIN HYDROCHLORIDE 0.4 MG/1
0.4 CAPSULE ORAL ONCE
Status: COMPLETED | OUTPATIENT
Start: 2024-12-08 | End: 2024-12-08

## 2024-12-08 RX ORDER — HYDROCODONE BITARTRATE AND ACETAMINOPHEN 7.5; 325 MG/1; MG/1
1 TABLET ORAL EVERY 6 HOURS PRN
Qty: 12 TABLET | Refills: 0 | Status: SHIPPED | OUTPATIENT
Start: 2024-12-08

## 2024-12-08 RX ORDER — SODIUM CHLORIDE 0.9 % (FLUSH) 0.9 %
10 SYRINGE (ML) INJECTION AS NEEDED
Status: DISCONTINUED | OUTPATIENT
Start: 2024-12-08 | End: 2024-12-08 | Stop reason: HOSPADM

## 2024-12-08 RX ORDER — TAMSULOSIN HYDROCHLORIDE 0.4 MG/1
1 CAPSULE ORAL DAILY
Qty: 30 CAPSULE | Refills: 0 | Status: SHIPPED | OUTPATIENT
Start: 2024-12-08 | End: 2024-12-10 | Stop reason: SDUPTHER

## 2024-12-08 RX ADMIN — HYDROCODONE BITARTRATE AND ACETAMINOPHEN 1 TABLET: 5; 325 TABLET ORAL at 17:06

## 2024-12-08 RX ADMIN — MORPHINE SULFATE 4 MG: 2 INJECTION, SOLUTION INTRAMUSCULAR; INTRAVENOUS at 12:19

## 2024-12-08 RX ADMIN — ONDANSETRON 4 MG: 2 INJECTION, SOLUTION INTRAMUSCULAR; INTRAVENOUS at 12:13

## 2024-12-08 RX ADMIN — TAMSULOSIN HYDROCHLORIDE 0.4 MG: 0.4 CAPSULE ORAL at 17:06

## 2024-12-08 RX ADMIN — IOPAMIDOL 85 ML: 612 INJECTION, SOLUTION INTRAVENOUS at 15:11

## 2024-12-08 RX ADMIN — MORPHINE SULFATE 4 MG: 2 INJECTION, SOLUTION INTRAMUSCULAR; INTRAVENOUS at 15:32

## 2024-12-08 NOTE — DISCHARGE INSTRUCTIONS
Stay well-hydrated, take medications as prescribed, close outpatient urology follow-up as discussed, PCP follow-up as needed, ED return for worsening symptoms as needed.

## 2024-12-08 NOTE — Clinical Note
[facility] EMERGENCY DEPARTMENT  [address] [facility] [address]  Phone: [phone]    Smith Chisholm was seen and treated in our emergency department on 12/8/2024.  He may return to work on 12/12/2024.         Thank you for choosing [facility].    Kolton Curtis MD

## 2024-12-08 NOTE — ED PROVIDER NOTES
Pt presents to the ED c/o right flank and lower quadrant abdominal pain has been ongoing for the past several days.  Pain has been constant since onset 4 days ago.  Has not had any significant diarrhea, no significant dysuria.  Does have a history of kidney stones.  Was actually seen for this several days ago and had a reassuring workup at that time.  Pain did not improve though.     On exam,   General: No acute distress, nontoxic  HEENT: Mucous membranes moist, atraumatic, EOMI  Neck: Full ROM  Pulm: Symmetric chest rise, nonlabored  Cardiovascular: Regular rate and rhythm, intact distal pulses  GI: Soft, right lower quadrant tenderness to palpation, nondistended, no rebound, no guarding, bowel sounds present  MSK: Full ROM, no deformity  Skin: Warm, dry  Neuro: Awake, alert, oriented x 4, GCS 15, moving all extremities, no focal deficits  Psych: Calm, cooperative          Plan: Plan for labs, urinalysis, supportive care, reevaluate, CT scan on the sixth did not show any evidence of any acute emergent process.  However, symptoms have persisted.  May need to repeat scan today to recheck for any changes in the interim.    ED Course as of 12/08/24 1619   Sun Dec 08, 2024   1244 Bacteria, UA: None Seen [DC]   1244 WBC, UA: 0-2 [DC]   1244 RBC, UA: 0-2 [DC]   1244 Nitrite, UA: Negative [DC]   1244 Leukocytes, UA: Negative [DC]   1244 Blood, UA(!): Trace [DC]   1244 Ketones, UA: Negative [DC]   1313 Lipase: 46 [DC]   1401 Glucose(!): 152 [DC]   1401 BUN: 10 [DC]   1401 Creatinine: 0.79 [DC]   1401 Sodium(!): 135 [DC]   1401 Potassium: 4.0 [DC]   1401 ALT (SGPT): 23 [DC]   1401 AST (SGOT): 19 [DC]   1401 Alkaline Phosphatase: 90 [DC]   1401 Total Bilirubin: 0.3 [DC]   1523 CT Abdomen Pelvis With Contrast  Per my independent interpretation of the CT abdomen pelvis, no overtly evident urolithiasis, no overtly evident colitis or signs of bowel obstruction, distended bladder [DC]   1608 CT Abdomen Pelvis With  Contrast  Radiology report reviewed, no findings of any calcified renal or ureteral stones but there is fullness of the proximal/mid right ureter suggestive of a noncalcified 4 mm stone significantly distended bladder at the level umbilicus [DC]   1614 Patient symptomatically improved, will plan for discharge with outpatient Urology follow up. After CT, had 1L urinary output, clear, feels much more relief.  [DC]      ED Course User Index  [DC] Kolton Curtis MD       Patient with findings of possible noncalcified 4 mm right-sided kidney stone, symptomatically improved, safe for outpatient follow-up and management.  ED return for worsening symptoms as needed.     Diagnosis Plan   1. Calculus of ureter  HYDROcodone-acetaminophen (NORCO) 7.5-325 MG per tablet      2. Renal colic on right side  HYDROcodone-acetaminophen (NORCO) 7.5-325 MG per tablet        DISCHARGE    Patient discharged in stable condition.    Reviewed implications of results, diagnosis, meds, responsibility to follow up, warning signs and symptoms of possible worsening, potential complications and reasons to return to ER. If your blood pressure > 120/80 please follow up with your primary care provider for further management.    Patient/Family voiced understanding of above instructions.    Discussed plan for discharge, as there is no emergent indication for admission. Pt/family is agreeable and understands need for follow up and repeat testing.  Pt is aware that discharge does not mean that nothing is wrong but it indicates no emergency is present that requires admission and they must continue care with follow-up as given below or physician of their choice.     FOLLOW-UP  [facility] EMERGENCY DEPARTMENT  [address] [facility] [address] [phone]    As needed, If symptoms worsen    [facility]  [address] [facility] [address]  [phone]  Call on 12/9/2024      Roseann Us MD  [address]  Statement Selected [facility] [address]  [facility] [id number] [phone]    Schedule an appointment as soon as possible for a visit   As needed         Medication List        New Prescriptions      naloxone 4 MG/0.1ML nasal spray  Commonly known as: NARCAN  Call 911. Don't prime. [place] in 1 nostril for overdose. Repeat in 2-3 minutes in other nostril if no or minimal breathing/responsiveness.     ondansetron ODT 4 MG disintegrating tablet  Commonly known as: ZOFRAN-ODT  Place 1 tablet on the tongue Every 8 (Eight) Hours As Needed for Nausea or Vomiting for up to 5 days.     tamsulosin 0.4 MG capsule 24 hr capsule  Commonly known as: FLOMAX  Take 1 capsule by mouth Daily.            Changed      * HYDROcodone-acetaminophen 5-325 MG per tablet  Commonly known as: NORCO  What changed: Another medication with the same name was added. Make sure you understand how and when to take each.     * HYDROcodone-acetaminophen 7.5-325 MG per tablet  Commonly known as: NORCO  Take 1 tablet by mouth Every 6 (Six) Hours As Needed for Severe Pain.  What changed: You were already taking a medication with the same name, and this prescription was added. Make sure you understand how and when to take each.           * This list has 2 medication(s) that are the same as other medications prescribed for you. Read the directions carefully, and ask your doctor or other care provider to review them with you.                   Where to Get Your Medications        These medications were sent to [facility] #[id number] - [place], [place] - [address] [facility] AT [facility] - [phone] [facility] [phone]   [address] [facility] [address]      Phone: [phone]   HYDROcodone-acetaminophen 7.5-325 MG per tablet  naloxone 4 MG/0.1ML nasal spray  ondansetron ODT 4 MG disintegrating tablet  tamsulosin 0.4 MG capsule 24 hr capsule              MD Attestation Note    SHARED VISIT: This visit was performed by BOTH a physician and an  APC. The substantive portion of the medical decision making was performed by this attesting physician who made or approved the management plan and takes responsibility for patient management. All studies in the APC note (if performed) were independently interpreted by me.                   Kolton Curtis MD  12/08/24 3763

## 2024-12-10 ENCOUNTER — OFFICE VISIT (OUTPATIENT)
Dept: FAMILY MEDICINE CLINIC | Facility: CLINIC | Age: 49
End: 2024-12-10
Payer: COMMERCIAL

## 2024-12-10 VITALS
DIASTOLIC BLOOD PRESSURE: 100 MMHG | RESPIRATION RATE: 16 BRPM | TEMPERATURE: 97.9 F | SYSTOLIC BLOOD PRESSURE: 140 MMHG | HEIGHT: 64 IN | WEIGHT: 159.6 LBS | BODY MASS INDEX: 27.25 KG/M2 | HEART RATE: 94 BPM | OXYGEN SATURATION: 99 %

## 2024-12-10 DIAGNOSIS — N20.0 RIGHT KIDNEY STONE: Primary | ICD-10-CM

## 2024-12-10 PROCEDURE — 1125F AMNT PAIN NOTED PAIN PRSNT: CPT | Performed by: FAMILY MEDICINE

## 2024-12-10 PROCEDURE — 1160F RVW MEDS BY RX/DR IN RCRD: CPT | Performed by: FAMILY MEDICINE

## 2024-12-10 PROCEDURE — 99213 OFFICE O/P EST LOW 20 MIN: CPT | Performed by: FAMILY MEDICINE

## 2024-12-10 PROCEDURE — 3044F HG A1C LEVEL LT 7.0%: CPT | Performed by: FAMILY MEDICINE

## 2024-12-10 PROCEDURE — 3080F DIAST BP >= 90 MM HG: CPT | Performed by: FAMILY MEDICINE

## 2024-12-10 PROCEDURE — 1159F MED LIST DOCD IN RCRD: CPT | Performed by: FAMILY MEDICINE

## 2024-12-10 PROCEDURE — 3077F SYST BP >= 140 MM HG: CPT | Performed by: FAMILY MEDICINE

## 2024-12-10 RX ORDER — TAMSULOSIN HYDROCHLORIDE 0.4 MG/1
1 CAPSULE ORAL DAILY
Qty: 30 CAPSULE | Refills: 0 | Status: SHIPPED | OUTPATIENT
Start: 2024-12-10

## 2024-12-10 NOTE — PROGRESS NOTES
"Chief Complaint  Hospital Follow Up Visit (Kidney Stones)    Dony Chisholm presents to [facility]  History of Present Illness  Patient presents for a ER follow-up on 12/8/2024 where he was having right flank pain and lower quadrant right-sided abdominal pain.  This was going on for several days.  No dysuria or diarrhea.  He does have a history of kidney stones.  He was diagnosed with a kidney stone.  He was given pain meds and told to follow-up with PCP. He has not picked it up yet.    Objective   Vital Signs:  /100 (BP Location: Right arm)   Pulse 94   Temp 97.9 °F (36.6 °C)   Resp 16   Ht 162.6 cm (64\")   Wt 72.4 kg (159 lb 9.6 oz)   SpO2 99%   BMI 27.40 kg/m²   Estimated body mass index is 27.4 kg/m² as calculated from the following:    Height as of this encounter: 162.6 cm (64\").    Weight as of this encounter: 72.4 kg (159 lb 9.6 oz).            Physical Exam  Vitals and nursing note reviewed.   Constitutional:       Appearance: Normal appearance. He is well-developed.   Cardiovascular:      Rate and Rhythm: Normal rate and regular rhythm.      Heart sounds: Normal heart sounds. No murmur heard.  Pulmonary:      Effort: Pulmonary effort is normal. No respiratory distress.      Breath sounds: Normal breath sounds. No stridor. No wheezing or rhonchi.   Neurological:      General: No focal deficit present.      Mental Status: He is alert and oriented to person, place, and time. He is not disoriented.   Psychiatric:         Mood and Affect: Mood normal.         Behavior: Behavior normal.        Result Review :                Assessment and Plan   Diagnoses and all orders for this visit:    1. Right kidney stone (Primary)  -     Ambulatory Referral to Urology  -     tamsulosin (FLOMAX) 0.4 MG capsule 24 hr capsule; Take 1 capsule by mouth Daily.  Dispense: 30 capsule; Refill: 0             Follow Up   No follow-ups on file.  Patient was given instructions " and counseling regarding his condition or for health maintenance advice. Please see specific information pulled into the AVS if appropriate.       Work note.    Continue meds.  Start flomax.   hydorcodone from pharmacy.  Follow up with urology today.

## 2025-01-11 DIAGNOSIS — E78.2 HYPERLIPEMIA, MIXED: ICD-10-CM

## 2025-01-13 RX ORDER — ATORVASTATIN CALCIUM 20 MG/1
20 TABLET, FILM COATED ORAL DAILY
Qty: 90 TABLET | Refills: 0 | Status: SHIPPED | OUTPATIENT
Start: 2025-01-13

## 2025-01-17 ENCOUNTER — TELEMEDICINE (OUTPATIENT)
Dept: FAMILY MEDICINE CLINIC | Facility: CLINIC | Age: 50
End: 2025-01-17
Payer: COMMERCIAL

## 2025-01-17 DIAGNOSIS — K14.0 TONGUE ULCER: Primary | ICD-10-CM

## 2025-01-17 PROCEDURE — 1160F RVW MEDS BY RX/DR IN RCRD: CPT | Performed by: FAMILY MEDICINE

## 2025-01-17 PROCEDURE — 1159F MED LIST DOCD IN RCRD: CPT | Performed by: FAMILY MEDICINE

## 2025-01-17 PROCEDURE — 1125F AMNT PAIN NOTED PAIN PRSNT: CPT | Performed by: FAMILY MEDICINE

## 2025-01-17 PROCEDURE — 99213 OFFICE O/P EST LOW 20 MIN: CPT | Performed by: FAMILY MEDICINE

## 2025-01-17 RX ORDER — AMOXICILLIN 875 MG/1
875 TABLET, COATED ORAL 2 TIMES DAILY
Qty: 20 TABLET | Refills: 0 | Status: SHIPPED | OUTPATIENT
Start: 2025-01-17

## 2025-01-17 NOTE — PROGRESS NOTES
CHIEF COMPLAINT:ulcer     Subjective   Smith Chisholm is a 50 y.o. male.     History of Present Illness   Patient presents during the Covid-19 pandemic/federally declared state of public health emergency.  This service was conducted via video visit  PT is at home and I am at my desk at my office.    Presents with a few day history of ulcer on his tongue.  He states it is painful and he went to urgent care on the 15th for this.  He was given a cocktail BX to help with this.  He states that is not getting any better.  In the past he has had amoxicillin and it has helped care of that for him.  He is wanting this again.    The following portions of the patient's history were reviewed and updated as appropriate: allergies, current medications, past family history, past medical history, past social history, past surgical history and problem list.    Review of Systems    Patient Active Problem List   Diagnosis    Nephrolithiasis    Hypertriglyceridemia    Eczema    Acute seasonal allergic rhinitis due to pollen    Bilateral chronic serous otitis media    Generalized abdominal pain    Primary insomnia    Migraine without aura and without status migrainosus, not intractable    Chronic gastric ulcer without hemorrhage and without perforation    Vitamin D deficiency    Lower abdominal pain    GERD without esophagitis    Renal cyst    Fatty liver    Type 2 diabetes mellitus with hyperglycemia, without long-term current use of insulin    Other proteinuria    Partial small bowel obstruction    Other headache syndrome    RUQ pain    Tooth loose    Tooth pain    Primary hypertension    Neck strain    Right kidney stone       No Known Allergies      Current Outpatient Medications:     albuterol sulfate  (90 Base) MCG/ACT inhaler, Inhale 2 puffs Every 4 (Four) Hours As Needed for Wheezing. (Patient not taking: Reported on 12/10/2024), Disp: 18 g, Rfl: 2    aspirin-acetaminophen-caffeine (Excedrin Migraine) 250-250-65 MG per  tablet, Take 1 tablet by mouth 2 (Two) Times a Day As Needed for Headache. (Patient not taking: Reported on 12/10/2024), Disp: 50 tablet, Rfl: 0    atorvastatin (LIPITOR) 20 MG tablet, TAKE 1 TABLET BY MOUTH DAILY, Disp: 90 tablet, Rfl: 0    baclofen (LIORESAL) 20 MG tablet, Take 1 tablet by mouth 3 (Three) Times a Day As Needed for Muscle Spasms., Disp: 30 tablet, Rfl: 0    clotrimazole (LOTRIMIN) 1 % cream, Apply 1 Application topically to the appropriate area as directed 2 (Two) Times a Day., Disp: 28 g, Rfl: 0    clotrimazole-betamethasone (Lotrisone) 1-0.05 % cream, Apply 1 Application topically to the appropriate area as directed 2 (Two) Times a Day., Disp: 45 g, Rfl: 2    dicyclomine (BENTYL) 20 MG tablet, Take 1 tablet by mouth. (Patient not taking: Reported on 12/10/2024), Disp: , Rfl:     diphenhydrAMINE-zinc acetate (BENADRYL) 1-0.1 % cream, Apply  topically to the appropriate area as directed. (Patient not taking: Reported on 12/10/2024), Disp: , Rfl:     fenofibrate (TRICOR) 145 MG tablet, Take 1 tablet by mouth Daily., Disp: 90 tablet, Rfl: 1    HYDROcodone-acetaminophen (NORCO) 5-325 MG per tablet, , Disp: , Rfl:     HYDROcodone-acetaminophen (NORCO) 7.5-325 MG per tablet, Take 1 tablet by mouth Every 6 (Six) Hours As Needed for Severe Pain., Disp: 12 tablet, Rfl: 0    lisinopril (PRINIVIL,ZESTRIL) 2.5 MG tablet, TAKE 1 TABLET BY MOUTH DAILY (Patient not taking: Reported on 12/10/2024), Disp: 90 tablet, Rfl: 0    metFORMIN (GLUCOPHAGE) 1000 MG tablet, Take 1 tablet by mouth 2 (Two) Times a Day With Meals., Disp: 180 tablet, Rfl: 0    naloxone (NARCAN) 4 MG/0.1ML nasal spray, Call 911. Don't prime. [place] in 1 nostril for overdose. Repeat in 2-3 minutes in other nostril if no or minimal breathing/responsiveness., Disp: 2 each, Rfl: 0    omeprazole (priLOSEC) 40 MG capsule, Take 1 capsule by mouth 2 (Two) Times a Day., Disp: 180 capsule, Rfl: 0    pantoprazole (PROTONIX) 40 MG EC tablet, Take 1 tablet by  mouth Daily., Disp: 90 tablet, Rfl: 1    tamsulosin (FLOMAX) 0.4 MG capsule 24 hr capsule, Take 1 capsule by mouth Daily., Disp: 30 capsule, Rfl: 0    tobramycin (Tobrex) 0.3 % solution ophthalmic solution, 2 drops affected eye every 4 hours while awake. (Patient not taking: Reported on 12/10/2024), Disp: 5 mL, Rfl: 0    vitamin D (ERGOCALCIFEROL) 1.25 MG (30211 UT) capsule capsule, Take one capsule twice a week., Disp: 24 capsule, Rfl: 1    Past Medical History:   Diagnosis Date    Abdominal pain     Acute seasonal allergic rhinitis due to pollen     Diabetes mellitus     Eczema     Hypertriglyceridemia     Nephrolithiasis     Paresthesia of left leg     Small bowel obstruction        Past Surgical History:   Procedure Laterality Date    APPENDECTOMY      COLONOSCOPY N/A 1/14/2021    Procedure: COLONOSCOPY to cecum and TI with cold polypectomy;  Surgeon: Francesco Chowdhury MD;  Location: [facility];  Service: Gastroenterology;  Laterality: N/A;  pre: abdominal pain and rectal bleeding  post: internal hemorrhoids, normal TI, polyp    ENDOSCOPY N/A 1/14/2021    Procedure: ESOPHAGOGASTRODUODENOSCOPY with cold biopsies;  Surgeon: Francesco Chowdhury MD;  Location:  [facility];  Service: Gastroenterology;  Laterality: N/A;  pre: h/o ulcers  post: ulcers healed, gastritis       Family History   Problem Relation Age of Onset    No Known Problems Mother        Social History     Tobacco Use    Smoking status: Never    Smokeless tobacco: Never   Substance Use Topics    Alcohol use: No            Objective     There were no vitals taken for this visit. Video Visit    Physical Exam  NAD  AAOx3  No labored breathing.  EOMI   PERRLA      Lab Results   Component Value Date    GLUCOSE 152 (H) 12/08/2024    BUN 10 12/08/2024    CREATININE 0.79 12/08/2024    EGFRIFNONA 95 02/26/2022    EGFRIFAFRI 115 02/26/2022    BCR 12.7 12/08/2024    K 4.0 12/08/2024    CO2 26.0 12/08/2024    CALCIUM 8.9 12/08/2024    PROTENTOTREF 6.8  08/13/2024    ALBUMIN 3.5 12/08/2024    LABIL2 1.8 03/08/2024    AST 19 12/08/2024    ALT 23 12/08/2024       WBC   Date Value Ref Range Status   12/08/2024 5.25 3.40 - 10.80 10*3/mm3 Final   12/26/2023 10.20 4.5 - 11.0 10*3/uL Final     RBC   Date Value Ref Range Status   12/08/2024 4.48 4.14 - 5.80 10*6/mm3 Final   12/26/2023 4.43 (L) 4.5 - 5.9 10*6/uL Final     Hemoglobin   Date Value Ref Range Status   12/08/2024 12.9 (L) 13.0 - 17.7 g/dL Final   12/26/2023 12.5 (L) 13.5 - 17.5 g/dL Final     Hematocrit   Date Value Ref Range Status   12/08/2024 38.7 37.5 - 51.0 % Final   12/26/2023 39.3 (L) 41.0 - 53.0 % Final     MCV   Date Value Ref Range Status   12/08/2024 86.4 79.0 - 97.0 fL Final   12/26/2023 88.7 80.0 - 100.0 fL Final     MCH   Date Value Ref Range Status   12/08/2024 28.8 26.6 - 33.0 pg Final   12/26/2023 28.2 26.0 - 34.0 pg Final     MCHC   Date Value Ref Range Status   12/08/2024 33.3 31.5 - 35.7 g/dL Final   12/26/2023 31.8 31.0 - 37.0 g/dL Final     RDW   Date Value Ref Range Status   12/08/2024 13.7 12.3 - 15.4 % Final   12/26/2023 12.6 12.0 - 16.8 % Final     RDW-SD   Date Value Ref Range Status   12/08/2024 43.2 37.0 - 54.0 fl Final     MPV   Date Value Ref Range Status   12/08/2024 9.8 6.0 - 12.0 fL Final   12/26/2023 10.0 8.4 - 12.4 fL Final     Platelets   Date Value Ref Range Status   12/08/2024 226 140 - 450 10*3/mm3 Final   12/26/2023 240 140 - 440 10*3/uL Final     Neutrophil Rel %   Date Value Ref Range Status   12/26/2023 66.5 45 - 80 % Final     Neutrophil %   Date Value Ref Range Status   12/08/2024 49.3 42.7 - 76.0 % Final     Lymphocyte Rel %   Date Value Ref Range Status   12/26/2023 27.2 15 - 50 % Final     Lymphocyte %   Date Value Ref Range Status   12/08/2024 39.4 19.6 - 45.3 % Final     Monocyte Rel %   Date Value Ref Range Status   12/26/2023 3.9 0 - 15 % Final     Monocyte %   Date Value Ref Range Status   12/08/2024 6.7 5.0 - 12.0 % Final     Eosinophil %   Date Value Ref  "Range Status   12/08/2024 3.8 0.3 - 6.2 % Final   12/26/2023 1.6 0 - 7 % Final     Basophil Rel %   Date Value Ref Range Status   12/26/2023 0.4 0 - 2 % Final     Basophil %   Date Value Ref Range Status   12/08/2024 0.6 0.0 - 1.5 % Final     Immature Grans %   Date Value Ref Range Status   12/08/2024 0.2 0.0 - 0.5 % Final   12/26/2023 0.4 0.0 - 1.0 % Final     Neutrophils Absolute   Date Value Ref Range Status   12/26/2023 6.79 2.0 - 8.8 10*3/uL Final     Neutrophils, Absolute   Date Value Ref Range Status   12/08/2024 2.59 1.70 - 7.00 10*3/mm3 Final     Lymphocytes Absolute   Date Value Ref Range Status   12/26/2023 2.77 0.7 - 5.5 10*3/uL Final     Lymphocytes, Absolute   Date Value Ref Range Status   12/08/2024 2.07 0.70 - 3.10 10*3/mm3 Final     Monocytes Absolute   Date Value Ref Range Status   12/26/2023 0.40 0.0 - 1.7 10*3/uL Final     Monocytes, Absolute   Date Value Ref Range Status   12/08/2024 0.35 0.10 - 0.90 10*3/mm3 Final     Eosinophils Absolute   Date Value Ref Range Status   12/26/2023 0.16 0.0 - 0.8 10*3/uL Final     Eosinophils, Absolute   Date Value Ref Range Status   12/08/2024 0.20 0.00 - 0.40 10*3/mm3 Final     Basophils Absolute   Date Value Ref Range Status   12/26/2023 0.04 0.0 - 0.2 10*3/uL Final     Basophils, Absolute   Date Value Ref Range Status   12/08/2024 0.03 0.00 - 0.20 10*3/mm3 Final     Immature Grans, Absolute   Date Value Ref Range Status   12/08/2024 0.01 0.00 - 0.05 10*3/mm3 Final   12/26/2023 0.04 0.00 - 0.10 10*3/uL Final     nRBC   Date Value Ref Range Status   12/08/2024 0.0 0.0 - 0.2 /100 WBC Final       Lab Results   Component Value Date    HGBA1C 6.8 (H) 08/13/2024       Lab Results   Component Value Date    ONSBWYFV45 414 08/21/2020       TSH   Date Value Ref Range Status   08/21/2020 2.520 0.270 - 4.200 uIU/mL Final       No results found for: \"CHOL\"  Lab Results   Component Value Date    TRIG 1,319 (H) 08/13/2024     Lab Results   Component Value Date    HDL 29 (L) " "08/13/2024     Lab Results   Component Value Date    LDL Comment (A) 08/13/2024     Lab Results   Component Value Date    VLDL Comment (A) 08/13/2024     No results found for: \"LDLHDL\"      Procedures    Assessment & Plan   Problems Addressed this Visit    None  Diagnoses    None.         No orders of the defined types were placed in this encounter.      Current Outpatient Medications   Medication Sig Dispense Refill    albuterol sulfate  (90 Base) MCG/ACT inhaler Inhale 2 puffs Every 4 (Four) Hours As Needed for Wheezing. (Patient not taking: Reported on 12/10/2024) 18 g 2    aspirin-acetaminophen-caffeine (Excedrin Migraine) 250-250-65 MG per tablet Take 1 tablet by mouth 2 (Two) Times a Day As Needed for Headache. (Patient not taking: Reported on 12/10/2024) 50 tablet 0    atorvastatin (LIPITOR) 20 MG tablet TAKE 1 TABLET BY MOUTH DAILY 90 tablet 0    baclofen (LIORESAL) 20 MG tablet Take 1 tablet by mouth 3 (Three) Times a Day As Needed for Muscle Spasms. 30 tablet 0    clotrimazole (LOTRIMIN) 1 % cream Apply 1 Application topically to the appropriate area as directed 2 (Two) Times a Day. 28 g 0    clotrimazole-betamethasone (Lotrisone) 1-0.05 % cream Apply 1 Application topically to the appropriate area as directed 2 (Two) Times a Day. 45 g 2    dicyclomine (BENTYL) 20 MG tablet Take 1 tablet by mouth. (Patient not taking: Reported on 12/10/2024)      diphenhydrAMINE-zinc acetate (BENADRYL) 1-0.1 % cream Apply  topically to the appropriate area as directed. (Patient not taking: Reported on 12/10/2024)      fenofibrate (TRICOR) 145 MG tablet Take 1 tablet by mouth Daily. 90 tablet 1    HYDROcodone-acetaminophen (NORCO) 5-325 MG per tablet  (Patient not taking: Reported on 12/10/2024)      HYDROcodone-acetaminophen (NORCO) 7.5-325 MG per tablet Take 1 tablet by mouth Every 6 (Six) Hours As Needed for Severe Pain. 12 tablet 0    lisinopril (PRINIVIL,ZESTRIL) 2.5 MG tablet TAKE 1 TABLET BY MOUTH DAILY " (Patient not taking: Reported on 12/10/2024) 90 tablet 0    metFORMIN (GLUCOPHAGE) 1000 MG tablet Take 1 tablet by mouth 2 (Two) Times a Day With Meals. 180 tablet 0    naloxone (NARCAN) 4 MG/0.1ML nasal spray Call 911. Don't prime. [place] in 1 nostril for overdose. Repeat in 2-3 minutes in other nostril if no or minimal breathing/responsiveness. 2 each 0    omeprazole (priLOSEC) 40 MG capsule Take 1 capsule by mouth 2 (Two) Times a Day. 180 capsule 0    pantoprazole (PROTONIX) 40 MG EC tablet Take 1 tablet by mouth Daily. 90 tablet 1    tamsulosin (FLOMAX) 0.4 MG capsule 24 hr capsule Take 1 capsule by mouth Daily. 30 capsule 0    tobramycin (Tobrex) 0.3 % solution ophthalmic solution 2 drops affected eye every 4 hours while awake. (Patient not taking: Reported on 12/10/2024) 5 mL 0    vitamin D (ERGOCALCIFEROL) 1.25 MG (93412 UT) capsule capsule Take one capsule twice a week. 24 capsule 1     No current facility-administered medications for this visit.       No follow-ups on file.    There are no Patient Instructions on file for this visit.         Time spent on visit:  15   minutes.  This patient has consented to a telehealth visit via video. The visit was scheduled as a video visit to comply with patient safety concerns in accordance with CDC recommendations.  All vitals recorded within this visit are reported by the patient.      Start amoxicillin.  Follow-up with dentist if no better.

## 2025-01-30 ENCOUNTER — TELEMEDICINE (OUTPATIENT)
Dept: FAMILY MEDICINE CLINIC | Facility: CLINIC | Age: 50
End: 2025-01-30
Payer: COMMERCIAL

## 2025-01-30 DIAGNOSIS — L20.9 ATOPIC DERMATITIS OF SCALP: Primary | ICD-10-CM

## 2025-01-30 PROCEDURE — 99213 OFFICE O/P EST LOW 20 MIN: CPT

## 2025-01-30 PROCEDURE — 1125F AMNT PAIN NOTED PAIN PRSNT: CPT

## 2025-01-30 RX ORDER — KETOCONAZOLE 20 MG/ML
SHAMPOO, SUSPENSION TOPICAL 2 TIMES WEEKLY
Qty: 120 ML | Refills: 12 | Status: SHIPPED | OUTPATIENT
Start: 2025-01-30

## 2025-01-30 RX ORDER — FLUOCINONIDE TOPICAL SOLUTION USP, 0.05% 0.5 MG/ML
SOLUTION TOPICAL 2 TIMES DAILY
Qty: 60 ML | Refills: 12 | Status: SHIPPED | OUTPATIENT
Start: 2025-01-30

## 2025-01-30 NOTE — PROGRESS NOTES
"Chief Complaint  Rash (All over his head, has itchiness, white spots)    Dony Chisholm presents to [facility]  Rash            This was an audio and video enabled telemedicine encounter.  Patient location: home address as in chart  Physician location: [place] [address]   Start time: 0954  End time: 1000  Total time of encounter: 6 mins    You have chosen to receive care through a telephone visit. Do you consent to use a telephone visit for your medical care today? Yes    Atopic dermatitis  All over his head.  White patches, Itchy.  Used shampoo in the past from dr vallejo and it helped but came back.  Has never seen dermatologist for it.  Ongoing on and off for several years.        Objective   Vital Signs:  There were no vitals taken for this visit.  Estimated body mass index is 27.4 kg/m² as calculated from the following:    Height as of 12/10/24: 162.6 cm (64\").    Weight as of 12/10/24: 72.4 kg (159 lb 9.6 oz).          Physical Exam  Constitutional:       Appearance: Normal appearance.   Pulmonary:      Effort: No respiratory distress.   Skin:     Findings: Rash present.      Comments: Difficult to assess due to telemedicine; large white, dry, patches to various areas of scalp.   Neurological:      Mental Status: He is oriented to person, place, and time. Mental status is at baseline.   Psychiatric:         Mood and Affect: Mood normal.        Result Review :                Assessment and Plan   Diagnoses and all orders for this visit:    1. Atopic dermatitis of scalp (Primary)  -     Ambulatory Referral to Dermatology  -     fluocinonide (LIDEX) 0.05 % external solution; Apply  topically to the appropriate area as directed 2 (Two) Times a Day. Until areas on scalp resolve.  Dispense: 60 mL; Refill: 12  -     ketoconazole (NIZORAL) 2 % shampoo; Apply  topically to the appropriate area as directed 2 (Two) Times a Week.  Dispense: 120 mL; Refill: 12      Discussed new " medications and s/e  F/u if symptoms do not improve.          Follow Up   No follow-ups on file.  Patient was given instructions and counseling regarding his condition or for health maintenance advice. Please see specific information pulled into the AVS if appropriate.               Mode of Visit: Video  Location of patient: -HOME-  Location of provider: +[facility]+  You have chosen to receive care through a telehealth visit.  The patient has signed the video visit consent form.  The visit included audio and video interaction. No technical issues occurred during this visit.    I spent 15 minutes caring for Smith on this date of service. This time includes time spent by me in the following activities: preparing for the visit, performing a medically appropriate examination and/or evaluation, counseling and educating the patient/family/caregiver, referring and communicating with other health care professionals, documenting information in the medical record, ordering medications, and obtaining a separately obtained history

## 2025-02-28 ENCOUNTER — CLINICAL SUPPORT (OUTPATIENT)
Dept: FAMILY MEDICINE CLINIC | Facility: CLINIC | Age: 50
End: 2025-02-28
Payer: COMMERCIAL

## 2025-02-28 DIAGNOSIS — Z28.39 IMMUNIZATIONS INCOMPLETE: Primary | ICD-10-CM

## 2025-02-28 PROCEDURE — 90656 IIV3 VACC NO PRSV 0.5 ML IM: CPT | Performed by: FAMILY MEDICINE

## 2025-02-28 PROCEDURE — 96372 THER/PROPH/DIAG INJ SC/IM: CPT | Performed by: FAMILY MEDICINE

## 2025-02-28 PROCEDURE — 90471 IMMUNIZATION ADMIN: CPT | Performed by: FAMILY MEDICINE

## 2025-03-07 DIAGNOSIS — N20.0 RIGHT KIDNEY STONE: ICD-10-CM

## 2025-03-07 RX ORDER — TAMSULOSIN HYDROCHLORIDE 0.4 MG/1
1 CAPSULE ORAL DAILY
Qty: 30 CAPSULE | Refills: 5 | Status: SHIPPED | OUTPATIENT
Start: 2025-03-07

## 2025-03-07 NOTE — TELEPHONE ENCOUNTER
LOV                  1/30/2025                    NOV                  Visit date not found  LAST REFILL   12/10/2024    PROTOCOL       Met

## 2025-04-10 DIAGNOSIS — E55.9 VITAMIN D DEFICIENCY: ICD-10-CM

## 2025-04-10 DIAGNOSIS — E11.65 TYPE 2 DIABETES MELLITUS WITH HYPERGLYCEMIA, WITHOUT LONG-TERM CURRENT USE OF INSULIN: ICD-10-CM

## 2025-04-10 RX ORDER — ERGOCALCIFEROL 1.25 MG/1
50000 CAPSULE, LIQUID FILLED ORAL 2 TIMES WEEKLY
Qty: 24 CAPSULE | Refills: 1 | Status: SHIPPED | OUTPATIENT
Start: 2025-04-10

## 2025-07-05 DIAGNOSIS — K21.01 GASTROESOPHAGEAL REFLUX DISEASE WITH ESOPHAGITIS AND HEMORRHAGE: ICD-10-CM

## 2025-07-07 ENCOUNTER — TELEPHONE (OUTPATIENT)
Dept: FAMILY MEDICINE CLINIC | Facility: CLINIC | Age: 50
End: 2025-07-07
Payer: COMMERCIAL

## 2025-07-07 DIAGNOSIS — E11.65 TYPE 2 DIABETES MELLITUS WITH HYPERGLYCEMIA, WITHOUT LONG-TERM CURRENT USE OF INSULIN: ICD-10-CM

## 2025-07-07 RX ORDER — OMEPRAZOLE 40 MG/1
40 CAPSULE, DELAYED RELEASE ORAL 2 TIMES DAILY
Qty: 60 CAPSULE | Refills: 1 | Status: SHIPPED | OUTPATIENT
Start: 2025-07-07

## 2025-07-07 NOTE — TELEPHONE ENCOUNTER
I have informed patient and he said that his reasoning is because his blood sugar has been getting low. He would like the metformin 500mg and the 1000mg sent to his pharmacy, regardless of the change that can happen to his A1c.    Please Advise  AG

## 2025-07-07 NOTE — TELEPHONE ENCOUNTER
LOV                  12/10/2024                    NOV                  7/7/2025  LAST REFILL  no longer on      PROTOCOL

## 2025-07-07 NOTE — TELEPHONE ENCOUNTER
LOV                  1/30/2025                   NOV                    LAST REFILL     10/29/2024  PROTOCOL       Met

## 2025-07-07 NOTE — TELEPHONE ENCOUNTER
Caller: Smith Chisholm    Relationship: Self    Best call back number: [phone]     What medication are you requesting: METFORMIN 500 MG    If a prescription is needed, what is your preferred pharmacy and phone number: [facility] #[id number] - [place], [place] - [address] AT [facility] - [phone] [facility] [phone]      Additional notes: PATIENT REQUESTS PRESCRIPTION FOR LOWER DOSAGE OF MEDICATION.

## 2025-07-08 NOTE — TELEPHONE ENCOUNTER
Patient called regarding his Metformin as it has not been received at his pharmacy yet. Patient would like to pick it up tomorrow. Please send to     [facility] #[id number] - [place], [place] - [address] AT [facility] - [phone] [facility] [phone] FX

## (undated) DEVICE — FRCP BX RADJAW4 NDL 2.8 240CM LG OG BX40

## (undated) DEVICE — TUBING, SUCTION, 1/4" X 10', STRAIGHT: Brand: MEDLINE

## (undated) DEVICE — LN SMPL CO2 SHTRM SD STREAM W/M LUER

## (undated) DEVICE — CANN O2 ETCO2 FITS ALL CONN CO2 SMPL A/ 7IN DISP LF

## (undated) DEVICE — THE TORRENT IRRIGATION SCOPE CONNECTOR IS USED WITH THE TORRENT IRRIGATION TUBING TO PROVIDE IRRIGATION FLUIDS SUCH AS STERILE WATER DURING GASTROINTESTINAL ENDOSCOPIC PROCEDURES WHEN USED IN CONJUNCTION WITH AN IRRIGATION PUMP (OR ELECTROSURGICAL UNIT).: Brand: TORRENT

## (undated) DEVICE — ADAPT CLN BIOGUARD AIR/H2O DISP

## (undated) DEVICE — SENSR O2 OXIMAX FNGR A/ 18IN NONSTR

## (undated) DEVICE — BITEBLOCK OMNI BLOC

## (undated) DEVICE — KT ORCA ORCAPOD DISP STRL